# Patient Record
Sex: FEMALE | Race: WHITE | NOT HISPANIC OR LATINO | Employment: OTHER | URBAN - METROPOLITAN AREA
[De-identification: names, ages, dates, MRNs, and addresses within clinical notes are randomized per-mention and may not be internally consistent; named-entity substitution may affect disease eponyms.]

---

## 2017-10-30 ENCOUNTER — APPOINTMENT (OUTPATIENT)
Dept: WOMENS IMAGING | Facility: HOSPITAL | Age: 77
End: 2017-10-30

## 2017-10-30 PROCEDURE — G0202 SCR MAMMO BI INCL CAD: HCPCS | Performed by: RADIOLOGY

## 2017-10-30 PROCEDURE — 77063 BREAST TOMOSYNTHESIS BI: CPT | Performed by: RADIOLOGY

## 2018-01-05 ENCOUNTER — APPOINTMENT (OUTPATIENT)
Dept: LAB | Facility: HOSPITAL | Age: 78
End: 2018-01-05

## 2018-01-05 ENCOUNTER — APPOINTMENT (OUTPATIENT)
Dept: ONCOLOGY | Facility: CLINIC | Age: 78
End: 2018-01-05

## 2018-01-22 ENCOUNTER — OFFICE VISIT (OUTPATIENT)
Dept: ONCOLOGY | Facility: CLINIC | Age: 78
End: 2018-01-22

## 2018-01-22 ENCOUNTER — LAB (OUTPATIENT)
Dept: LAB | Facility: HOSPITAL | Age: 78
End: 2018-01-22

## 2018-01-22 VITALS
HEIGHT: 67 IN | BODY MASS INDEX: 24.83 KG/M2 | HEART RATE: 86 BPM | TEMPERATURE: 98.1 F | WEIGHT: 158.2 LBS | SYSTOLIC BLOOD PRESSURE: 150 MMHG | RESPIRATION RATE: 16 BRPM | DIASTOLIC BLOOD PRESSURE: 90 MMHG | OXYGEN SATURATION: 98 %

## 2018-01-22 DIAGNOSIS — C50.011 MALIGNANT NEOPLASM OF AREOLA OF RIGHT BREAST IN FEMALE, UNSPECIFIED ESTROGEN RECEPTOR STATUS (HCC): Primary | ICD-10-CM

## 2018-01-22 LAB
ALBUMIN SERPL-MCNC: 4.3 G/DL (ref 3.5–5.2)
ALBUMIN/GLOB SERPL: 1.4 G/DL (ref 1.1–2.4)
ALP SERPL-CCNC: 66 U/L (ref 38–116)
ALT SERPL W P-5'-P-CCNC: 15 U/L (ref 0–33)
ANION GAP SERPL CALCULATED.3IONS-SCNC: 11.9 MMOL/L
AST SERPL-CCNC: 28 U/L (ref 0–32)
BASOPHILS # BLD AUTO: 0.03 10*3/MM3 (ref 0–0.1)
BASOPHILS NFR BLD AUTO: 0.8 % (ref 0–1.1)
BILIRUB SERPL-MCNC: 0.6 MG/DL (ref 0.1–1.2)
BUN BLD-MCNC: 7 MG/DL (ref 6–20)
BUN/CREAT SERPL: 10.3 (ref 7.3–30)
CALCIUM SPEC-SCNC: 9.7 MG/DL (ref 8.5–10.2)
CHLORIDE SERPL-SCNC: 98 MMOL/L (ref 98–107)
CO2 SERPL-SCNC: 30.1 MMOL/L (ref 22–29)
CREAT BLD-MCNC: 0.68 MG/DL (ref 0.6–1.1)
DEPRECATED RDW RBC AUTO: 41.4 FL (ref 37–49)
EOSINOPHIL # BLD AUTO: 0.03 10*3/MM3 (ref 0–0.36)
EOSINOPHIL NFR BLD AUTO: 0.8 % (ref 1–5)
ERYTHROCYTE [DISTWIDTH] IN BLOOD BY AUTOMATED COUNT: 12.3 % (ref 11.7–14.5)
GFR SERPL CREATININE-BSD FRML MDRD: 84 ML/MIN/1.73
GLOBULIN UR ELPH-MCNC: 3 GM/DL (ref 1.8–3.5)
GLUCOSE BLD-MCNC: 88 MG/DL (ref 74–124)
HCT VFR BLD AUTO: 47.6 % (ref 34–45)
HGB BLD-MCNC: 16.2 G/DL (ref 11.5–14.9)
HOLD SPECIMEN: NORMAL
IMM GRANULOCYTES # BLD: 0 10*3/MM3 (ref 0–0.03)
IMM GRANULOCYTES NFR BLD: 0 % (ref 0–0.5)
LYMPHOCYTES # BLD AUTO: 0.82 10*3/MM3 (ref 1–3.5)
LYMPHOCYTES NFR BLD AUTO: 22.6 % (ref 20–49)
MCH RBC QN AUTO: 31.3 PG (ref 27–33)
MCHC RBC AUTO-ENTMCNC: 34 G/DL (ref 32–35)
MCV RBC AUTO: 91.9 FL (ref 83–97)
MONOCYTES # BLD AUTO: 0.39 10*3/MM3 (ref 0.25–0.8)
MONOCYTES NFR BLD AUTO: 10.7 % (ref 4–12)
NEUTROPHILS # BLD AUTO: 2.36 10*3/MM3 (ref 1.5–7)
NEUTROPHILS NFR BLD AUTO: 65.1 % (ref 39–75)
NRBC BLD MANUAL-RTO: 0 /100 WBC (ref 0–0)
PLATELET # BLD AUTO: 221 10*3/MM3 (ref 150–375)
PMV BLD AUTO: 9.2 FL (ref 8.9–12.1)
POTASSIUM BLD-SCNC: 4.3 MMOL/L (ref 3.5–4.7)
PROT SERPL-MCNC: 7.3 G/DL (ref 6.3–8)
RBC # BLD AUTO: 5.18 10*6/MM3 (ref 3.9–5)
SODIUM BLD-SCNC: 140 MMOL/L (ref 134–145)
WBC NRBC COR # BLD: 3.63 10*3/MM3 (ref 4–10)

## 2018-01-22 PROCEDURE — 99213 OFFICE O/P EST LOW 20 MIN: CPT | Performed by: INTERNAL MEDICINE

## 2018-01-22 PROCEDURE — 85025 COMPLETE CBC W/AUTO DIFF WBC: CPT | Performed by: INTERNAL MEDICINE

## 2018-01-22 PROCEDURE — 36415 COLL VENOUS BLD VENIPUNCTURE: CPT | Performed by: INTERNAL MEDICINE

## 2018-01-22 PROCEDURE — 80053 COMPREHEN METABOLIC PANEL: CPT | Performed by: INTERNAL MEDICINE

## 2018-01-22 NOTE — PROGRESS NOTES
Subjective .  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 Stage I breast carcinoma, status post initiation of Arimidex on 12/15/2011.   2. ? Erythrocytosis in the setting of recent syncopal episode reviewed 01/13/2014.   3. JAK2 analysis negative, slow improvement per hemoglobin and hematocrit. Low erythropoietin level noted, vestibular rehab successful in reducing dizziness.   4. Erythrocytosis analyses negative for myeloproliferative disorder, improvement per blood pressure med ication adjustment noted.   5. Patient seen 12/17/2014, stable. A 12-month followup is planned/Arimidex anticipated until 2016.   6. Patient seen 01/19/2016, additional DEXA scan and followup assessment December 2016 with anticipated completion of Arimidex.   7. Patient reviewed December 20, 2016, Arimidex is continued, bone density with evidence of osteopenia  8. 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    History of Present Illness     Patient is a 77-year-old female with history of right-sided breast carcinoma. After delay initially, as a result of abscess petra-surgically, she was able to proceed with radiation therapy. We saw her late in December with bone de n sity showing mild osteopenia. As a result of this, she began Arimidex at approximately the same time. She was seen on 3/26/12 doing well on her medication and completed radiation therapy, and was actually improving in general at that point. We asked he r to continue Arimidex for an additional 4 months and seen on 8/9/12 doing well.       Thereafter she was asked to be reviewed again approximately six months later, 01/22/2013. She was having no difficulty with her Arimidex and we planned a yearly return.       She is now seen 01/13/2014 and though she it does not appear she is having trouble with the Arimidex, she does recognize two episodes of syncope that led to  a hospitalization and adjustment of her blood pressure medications the last several months. She has no t had any additional episodes since last seen, but it is noted that her hemoglobin and hematocrit are increasing in levels.       The patient was reviewed 01/12/2013 with JAK2 analysis, in particular found to be negative for [id number] point mutation. Additional kevin dies, however, show a reduced erythropoietin level down to 5.4, normal iron studies, normal B12 studies, and normal serum chemistry.       The patient returns today, she states that she has gone on and been assessed for vestibular dysfunction, and through additional rehab and exercise she no longer has the dizziness that she had previously, in fact feels somewhat better than previous.       The patient when reviewed 03/12/2014 had gradual escalation of hemoglobin and hematocrit. 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 These were all normal. As the patient returns back today, 06/04/2014, she is feeling somewhat better overall as result of blood pressure medication adjustment and it is likely that hemoconcentration was the reason for her increasing hemoglobin and hematocrit levels.       The patient thereafter was asked to return for followup and is now seen in December 2014. She is doing very well overall with no additional symptoms.       The patient was asked to see him back now reviewed 01/19/2016 doing well overall. She recognizes that she is very likely going to complete Arimidex by December of this year.    The patient is now reviewed December 20, 2016.  We plan to discontinue her Arimidex today.  A follow-up bone density was performed December 13 revealing evidence of osteopenia-lumbar T score -1.7 with a 4.6% interval decrease, left hip density mL neck with a T score -1.3 with a 4.4% interval decrease in a right hip density T score -1.7 with a 9.1%  interval decrease.  We have discussed with discontinuance of her AI therapy that this should improve.   The patient is next seen January 22, 2018.  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  She is ordered had the same process involving her right eye though this improved with corneal transplant previously.  She follows with ophthalmology regularly.        Past Medical History:   Diagnosis Date   • Anxiety    • Cancer     Stage I, right breast cancer   • Depression    • Disease of thyroid gland     hypothyroidusm   • Erythrocytosis    • Hypercholesterolemia    • Hypertension    • Osteoporosis     osteopenia mild       ONCOLOGIC HISTORY:  (History from previous dates can be found in the separate document.)    Current Outpatient Prescriptions on File Prior to Visit   Medication Sig Dispense Refill   • amLODIPine (NORVASC) 5 MG tablet Take  by mouth Daily.     • anastrozole (ARIMIDEX) 1 MG tablet 1 TAB TAKE 1 TABLET BY MOUTH DAILY 30 tablet 1   • aspirin 81 MG tablet Take  by mouth.     • atorvastatin (LIPITOR) 40 MG tablet Take  by mouth Daily.     • buPROPion XL (WELLBUTRIN XL) 300 MG 24 hr tablet Take  by mouth.     • Cholecalciferol (RA VITAMIN D-3) 2000 UNITS capsule Take  by mouth.     • levothyroxine (LEVOXYL) 100 MCG tablet Take  by mouth Daily.     • triamterene-hydrochlorothiazide (DYAZIDE) 37.5-25 MG per capsule Take  by mouth.     • venlafaxine XR (EFFEXOR-XR) 75 MG 24 hr capsule Take  by mouth Daily.       No current facility-administered medications on file prior to visit.        ALLERGIES:     Allergies   Allergen Reactions   • Ciprofloxacin        Social History     Social History   • Marital status:      Spouse name: Sami   • Number of children: N/A   • Years of education: N/A     Occupational History   •  Retired          Social History Main Topics   • Smoking status: Never Smoker   • Smokeless tobacco: Never Used   • Alcohol use Yes       Comment: 3-5 times per week   • Drug use: No   • Sexual activity: Not on file     Other Topics Concern   • Not on file     Social History Narrative         Cancer-related family history includes Brain cancer in her father; Breast cancer in her maternal aunt, other, and paternal aunt; Breast cancer (age of onset: 43) in her sister; Cancer in her maternal aunt; Liver cancer in her father.     Review of Systems  A comprehensive 14 point review of systems was performed and was negative except as mentioned.    Objective      There were no vitals filed for this visit.  Current Status 12/20/2016   ECOG score 0       Physical Exam      GENERAL: Well-developed, well-nourished female in no acute distress.   SKIN: Warm, dry without rashes, purpura or petechiae.   HEAD: Normocephalic.   EYES: Pupils equal, round and reactive to light. EOMs intact. Conjunctivae normal.   EARS: Hearing intact.   NOSE: Septum midline. No excoriations or nasal discharge.   MOUTH: Tongue is well-papillated; no stomatitis or ulcers. Lips normal.   THROAT: Oropharynx without lesions or exudates.   NECK: Supple with good range of motion; no thyromegaly or masses, no JVD or bruits.   LYMPHATICS: No cervical, supraclavicular, axillary or inguinal adenopathy.   CHEST: Lungs clear to percussion and auscultation.   CARDIAC: Regular rate and rhythm without murmurs, rubs or gallops.   ABDOMEN: Soft, nontender with no organomegaly or masses.   EXTREMITIES: No clubbing, cyanosis or edema.   NEUROLOGICAL: No focal neurological deficits.       RECENT LABS:  Hematology WBC   Date Value Ref Range Status   12/20/2016 5.18 4.00 - 10.00 10*3/mm3 Final     RBC   Date Value Ref Range Status   12/20/2016 4.83 3.90 - 5.00 10*6/mm3 Final     Hemoglobin   Date Value Ref Range Status   12/20/2016 14.9 11.5 - 14.9 g/dL Final     Hematocrit   Date Value Ref Range Status   12/20/2016 43.0 34.0 - 45.0 % Final     Platelets   Date Value Ref Range Status   12/20/2016 201 150  - 045 10*3/mm3 Final        Assessment/Plan   A 77-year-old female with:   1. Stage I breast carcinoma, status post initiation of Arimidex on 12/15/2011. She is tolerated this well and the drug was discontinued December 2016.  2. Erythrocytosis; consistent with hypertension and treatment thereof. Her current labs studies are acceptable.       PLAN: 1.  Yearly follow-up

## 2018-11-19 ENCOUNTER — APPOINTMENT (OUTPATIENT)
Dept: WOMENS IMAGING | Facility: HOSPITAL | Age: 78
End: 2018-11-19

## 2018-11-19 PROCEDURE — 77067 SCR MAMMO BI INCL CAD: CPT | Performed by: RADIOLOGY

## 2018-11-19 PROCEDURE — 77063 BREAST TOMOSYNTHESIS BI: CPT | Performed by: RADIOLOGY

## 2019-01-24 NOTE — PROGRESS NOTES
Subjective .  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 Stage I breast carcinoma, status post initiation of Arimidex on 12/15/2011.   2. ? Erythrocytosis in the setting of recent syncopal episode reviewed 01/13/2014.   3. JAK2 analysis negative, slow improvement per hemoglobin and hematocrit. Low erythropoietin level noted, vestibular rehab successful in reducing dizziness.   4. Erythrocytosis analyses negative for myeloproliferative disorder, improvement per blood pressure med ication adjustment noted.   5. Patient seen 12/17/2014, stable. A 12-month followup is planned/Arimidex anticipated until 2016.   6. Patient seen 01/19/2016, additional DEXA scan and followup assessment December 2016 with anticipated completion of Arimidex.   7. Patient reviewed December 20, 2016, Arimidex is continued, bone density with evidence of osteopenia  8. 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  9. Patient reviewed October 20, 2019, continued issues with her left eye recognize, stable otherwise    History of Present Illness     Patient is a 78-year-old female with history of right-sided breast carcinoma. After delay initially, as a result of abscess petra-surgically, she was able to proceed with radiation therapy. We saw her late in December with bone de n markos showing mild osteopenia. As a result of this, she began Arimidex at approximately the same time. She was seen on 3/26/12 doing well on her medication and completed radiation therapy, and was actually improving in general at that point. We asked he r to continue Arimidex for an additional 4 months and seen on 8/9/12 doing well.       Thereafter she was asked to be reviewed again approximately six months later, 01/22/2013. She was having no difficulty with her Arimidex and we planned a yearly return.       She is now seen 01/13/2014 and though she it does  not appear she is having trouble with the Arimidex, she does recognize two episodes of syncope that led to a hospitalization and adjustment of her blood pressure medications the last several months. She has no t had any additional episodes since last seen, but it is noted that her hemoglobin and hematocrit are increasing in levels.       The patient was reviewed 01/12/2013 with JAK2 analysis, in particular found to be negative for [id number] point mutation. Additional kevin dies, however, show a reduced erythropoietin level down to 5.4, normal iron studies, normal B12 studies, and normal serum chemistry.       The patient returns today, she states that she has gone on and been assessed for vestibular dysfunction, and through additional rehab and exercise she no longer has the dizziness that she had previously, in fact feels somewhat better than previous.       The patient when reviewed 03/12/2014 had gradual escalation of hemoglobin and hematocrit. 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 These were all normal. As the patient returns back today, 06/04/2014, she is feeling somewhat better overall as result of blood pressure medication adjustment and it is likely that hemoconcentration was the reason for her increasing hemoglobin and hematocrit levels.       The patient thereafter was asked to return for followup and is now seen in December 2014. She is doing very well overall with no additional symptoms.       The patient was asked to see him back now reviewed 01/19/2016 doing well overall. She recognizes that she is very likely going to complete Arimidex by December of this year.    The patient is now reviewed December 20, 2016.  We plan to discontinue her Arimidex today.  A follow-up bone density was performed December 13 revealing evidence of osteopenia-lumbar T score -1.7 with a 4.6% interval decrease, left hip density mL  neck with a T score -1.3 with a 4.4% interval decrease in a right hip density T score -1.7 with a 9.1% interval decrease.  We have discussed with discontinuance of her AI therapy that this should improve.   The patient is next seen January 22, 2018.  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  She is ordered had the same process involving her right eye though this improved with corneal transplant previously.  She follows with ophthalmology regularly.    The patient's next seen December 28, 2019.  Her left eye is to the point that she is having very poor vision and she has been told this nothing now second be done to improve this.  She still is able to function howeve    Past Medical History:   Diagnosis Date   • Anxiety    • Cancer (CMS/HCC)     Stage I, right breast cancer   • Depression    • Disease of thyroid gland     hypothyroidusm   • Erythrocytosis    • Hypercholesterolemia    • Hypertension    • Osteoporosis     osteopenia mild       ONCOLOGIC HISTORY:  (History from previous dates can be found in the separate document.)    Current Outpatient Medications on File Prior to Visit   Medication Sig Dispense Refill   • amLODIPine (NORVASC) 5 MG tablet Take  by mouth Daily.     • atorvastatin (LIPITOR) 40 MG tablet Take  by mouth Daily.     • buPROPion XL (WELLBUTRIN XL) 300 MG 24 hr tablet Take  by mouth.     • Cholecalciferol (RA VITAMIN D-3) 2000 UNITS capsule Take  by mouth.     • levothyroxine (LEVOXYL) 100 MCG tablet Take  by mouth Daily.     • triamterene-hydrochlorothiazide (DYAZIDE) 37.5-25 MG per capsule Take  by mouth.     • venlafaxine XR (EFFEXOR-XR) 75 MG 24 hr capsule Take  by mouth Daily.       No current facility-administered medications on file prior to visit.        ALLERGIES:     Allergies   Allergen Reactions   • Ciprofloxacin        Social History     Socioeconomic History   • Marital status:      Spouse name: Sami   • Number of  "children: Not on file   • Years of education: Not on file   • Highest education level: Not on file   Social Needs   • Financial resource strain: Not on file   • Food insecurity - worry: Not on file   • Food insecurity - inability: Not on file   • Transportation needs - medical: Not on file   • Transportation needs - non-medical: Not on file   Occupational History     Employer: RETIRED     Comment:    Tobacco Use   • Smoking status: Never Smoker   • Smokeless tobacco: Never Used   Substance and Sexual Activity   • Alcohol use: Yes     Comment: 3-5 times per week   • Drug use: No   • Sexual activity: Defer   Other Topics Concern   • Not on file   Social History Narrative   • Not on file         Cancer-related family history includes Brain cancer in her father; Breast cancer in her maternal aunt, other, and paternal aunt; Breast cancer (age of onset: 43) in her sister; Cancer in her maternal aunt; Liver cancer in her father.     Review of Systems   Constitutional: Negative for fatigue.   Respiratory: Negative for chest tightness, shortness of breath and wheezing.    Gastrointestinal: Negative for abdominal pain, constipation, diarrhea, nausea and vomiting.   Neurological: Negative for weakness.     A comprehensive 14 point review of systems was performed and was negative except as mentioned.    Objective      Vitals:    01/28/19 1525   BP: 154/86   Pulse: 62   Resp: 18   Temp: 98.2 °F (36.8 °C)   TempSrc: Oral   SpO2: 95%   Weight: 77.2 kg (170 lb 1.6 oz)   Height: 167.9 cm (66.1\")  Comment: new yearly height   PainSc: 0-No pain     Current Status 1/28/2019   ECOG score 1       Physical Exam      GENERAL: Well-developed, well-nourished female in no acute distress.   SKIN: Warm, dry without rashes, purpura or petechiae.   HEAD: Normocephalic.   EYES: Pupils equal, round and reactive to light. EOMs intact. Conjunctivae normal.   EARS: Hearing intact.   NOSE: Septum midline. No excoriations or nasal discharge. "   MOUTH: Tongue is well-papillated; no stomatitis or ulcers. Lips normal.   THROAT: Oropharynx without lesions or exudates.   NECK: Supple with good range of motion; no thyromegaly or masses, no JVD or bruits.   LYMPHATICS: No cervical, supraclavicular, axillary or inguinal adenopathy.   CHEST: Lungs clear to percussion and auscultation.   CARDIAC: Regular rate and rhythm without murmurs, rubs or gallops.   ABDOMEN: Soft, nontender with no organomegaly or masses.   EXTREMITIES: No clubbing, cyanosis or edema.   NEUROLOGICAL: No focal neurological deficits.       RECENT LABS:  Hematology WBC   Date Value Ref Range Status   01/28/2019 3.38 (L) 4.00 - 10.00 10*3/mm3 Final     RBC   Date Value Ref Range Status   01/28/2019 5.08 (H) 3.90 - 5.00 10*6/mm3 Final     Hemoglobin   Date Value Ref Range Status   01/28/2019 15.7 (H) 11.5 - 14.9 g/dL Final     Hematocrit   Date Value Ref Range Status   01/28/2019 45.2 (H) 34.0 - 45.0 % Final     Platelets   Date Value Ref Range Status   01/28/2019 186 150 - 375 10*3/mm3 Final        Assessment/Plan   A 78-year-old female with:   1. Stage I breast carcinoma, status post initiation of Arimidex on 12/15/2011. She is tolerated this well and the drug was discontinued December 2016.  2. Erythrocytosis; consistent with hypertension and treatment thereof. Her current labs studies are acceptable.       PLAN: 1.  Yearly follow-up

## 2019-01-28 ENCOUNTER — OFFICE VISIT (OUTPATIENT)
Dept: ONCOLOGY | Facility: CLINIC | Age: 79
End: 2019-01-28

## 2019-01-28 ENCOUNTER — LAB (OUTPATIENT)
Dept: LAB | Facility: HOSPITAL | Age: 79
End: 2019-01-28

## 2019-01-28 VITALS
RESPIRATION RATE: 18 BRPM | HEART RATE: 62 BPM | TEMPERATURE: 98.2 F | HEIGHT: 66 IN | DIASTOLIC BLOOD PRESSURE: 86 MMHG | WEIGHT: 170.1 LBS | SYSTOLIC BLOOD PRESSURE: 154 MMHG | BODY MASS INDEX: 27.34 KG/M2 | OXYGEN SATURATION: 95 %

## 2019-01-28 DIAGNOSIS — C50.011 MALIGNANT NEOPLASM OF AREOLA OF RIGHT BREAST IN FEMALE, UNSPECIFIED ESTROGEN RECEPTOR STATUS (HCC): Primary | ICD-10-CM

## 2019-01-28 LAB
ALBUMIN SERPL-MCNC: 4.4 G/DL (ref 3.5–5.2)
ALBUMIN/GLOB SERPL: 1.6 G/DL (ref 1.1–2.4)
ALP SERPL-CCNC: 62 U/L (ref 38–116)
ALT SERPL W P-5'-P-CCNC: 9 U/L (ref 0–33)
ANION GAP SERPL CALCULATED.3IONS-SCNC: 11.9 MMOL/L
AST SERPL-CCNC: 21 U/L (ref 0–32)
BASOPHILS # BLD AUTO: 0.01 10*3/MM3 (ref 0–0.1)
BASOPHILS NFR BLD AUTO: 0.3 % (ref 0–1.1)
BILIRUB SERPL-MCNC: 0.6 MG/DL (ref 0.1–1.2)
BUN BLD-MCNC: 11 MG/DL (ref 6–20)
BUN/CREAT SERPL: 15.1 (ref 7.3–30)
CALCIUM SPEC-SCNC: 9.4 MG/DL (ref 8.5–10.2)
CHLORIDE SERPL-SCNC: 92 MMOL/L (ref 98–107)
CO2 SERPL-SCNC: 28.1 MMOL/L (ref 22–29)
CREAT BLD-MCNC: 0.73 MG/DL (ref 0.6–1.1)
DEPRECATED RDW RBC AUTO: 40.1 FL (ref 37–49)
EOSINOPHIL # BLD AUTO: 0.03 10*3/MM3 (ref 0–0.36)
EOSINOPHIL NFR BLD AUTO: 0.9 % (ref 1–5)
ERYTHROCYTE [DISTWIDTH] IN BLOOD BY AUTOMATED COUNT: 12.3 % (ref 11.7–14.5)
GFR SERPL CREATININE-BSD FRML MDRD: 77 ML/MIN/1.73
GLOBULIN UR ELPH-MCNC: 2.7 GM/DL (ref 1.8–3.5)
GLUCOSE BLD-MCNC: 77 MG/DL (ref 74–124)
HCT VFR BLD AUTO: 45.2 % (ref 34–45)
HGB BLD-MCNC: 15.7 G/DL (ref 11.5–14.9)
IMM GRANULOCYTES # BLD AUTO: 0.01 10*3/MM3 (ref 0–0.03)
IMM GRANULOCYTES NFR BLD AUTO: 0.3 % (ref 0–0.5)
LYMPHOCYTES # BLD AUTO: 0.88 10*3/MM3 (ref 1–3.5)
LYMPHOCYTES NFR BLD AUTO: 26 % (ref 20–49)
MCH RBC QN AUTO: 30.9 PG (ref 27–33)
MCHC RBC AUTO-ENTMCNC: 34.7 G/DL (ref 32–35)
MCV RBC AUTO: 89 FL (ref 83–97)
MONOCYTES # BLD AUTO: 0.4 10*3/MM3 (ref 0.25–0.8)
MONOCYTES NFR BLD AUTO: 11.8 % (ref 4–12)
NEUTROPHILS # BLD AUTO: 2.05 10*3/MM3 (ref 1.5–7)
NEUTROPHILS NFR BLD AUTO: 60.7 % (ref 39–75)
NRBC BLD AUTO-RTO: 0 /100 WBC (ref 0–0)
PLATELET # BLD AUTO: 186 10*3/MM3 (ref 150–375)
PMV BLD AUTO: 9.8 FL (ref 8.9–12.1)
POTASSIUM BLD-SCNC: 4.3 MMOL/L (ref 3.5–4.7)
PROT SERPL-MCNC: 7.1 G/DL (ref 6.3–8)
RBC # BLD AUTO: 5.08 10*6/MM3 (ref 3.9–5)
SODIUM BLD-SCNC: 132 MMOL/L (ref 134–145)
WBC NRBC COR # BLD: 3.38 10*3/MM3 (ref 4–10)

## 2019-01-28 PROCEDURE — 99213 OFFICE O/P EST LOW 20 MIN: CPT | Performed by: INTERNAL MEDICINE

## 2019-01-28 PROCEDURE — 80053 COMPREHEN METABOLIC PANEL: CPT

## 2019-01-28 PROCEDURE — 36415 COLL VENOUS BLD VENIPUNCTURE: CPT | Performed by: INTERNAL MEDICINE

## 2019-01-28 PROCEDURE — 85025 COMPLETE CBC W/AUTO DIFF WBC: CPT

## 2019-07-18 ENCOUNTER — APPOINTMENT (OUTPATIENT)
Dept: WOMENS IMAGING | Facility: HOSPITAL | Age: 79
End: 2019-07-18

## 2019-07-18 PROCEDURE — 77065 DX MAMMO INCL CAD UNI: CPT | Performed by: RADIOLOGY

## 2019-07-18 PROCEDURE — 76641 ULTRASOUND BREAST COMPLETE: CPT | Performed by: RADIOLOGY

## 2019-07-18 PROCEDURE — G0279 TOMOSYNTHESIS, MAMMO: HCPCS | Performed by: RADIOLOGY

## 2019-07-24 ENCOUNTER — APPOINTMENT (OUTPATIENT)
Dept: WOMENS IMAGING | Facility: HOSPITAL | Age: 79
End: 2019-07-24

## 2020-01-29 ENCOUNTER — TELEPHONE (OUTPATIENT)
Dept: ONCOLOGY | Facility: CLINIC | Age: 80
End: 2020-01-29

## 2020-01-29 NOTE — TELEPHONE ENCOUNTER
Patient wanted to cancel her appointment on 2/10 with Dr. Knox.  I changed it to 2/28, but had the lab after the appointment with Dr. Knox.  She does not want any appointments before 9:00 AM.  I was unable to change the appts on 2/28 then, because they are not within the 3 week period after the original appointment.  Please call the patient to get these rescheduled.    [phone]

## 2020-02-10 ENCOUNTER — APPOINTMENT (OUTPATIENT)
Dept: LAB | Facility: HOSPITAL | Age: 80
End: 2020-02-10

## 2020-02-25 DIAGNOSIS — C50.011 MALIGNANT NEOPLASM OF AREOLA OF RIGHT BREAST IN FEMALE, UNSPECIFIED ESTROGEN RECEPTOR STATUS (HCC): Primary | ICD-10-CM

## 2020-02-28 ENCOUNTER — LAB (OUTPATIENT)
Dept: LAB | Facility: HOSPITAL | Age: 80
End: 2020-02-28

## 2020-02-28 ENCOUNTER — OFFICE VISIT (OUTPATIENT)
Dept: ONCOLOGY | Facility: CLINIC | Age: 80
End: 2020-02-28

## 2020-02-28 VITALS
HEART RATE: 59 BPM | SYSTOLIC BLOOD PRESSURE: 119 MMHG | OXYGEN SATURATION: 95 % | RESPIRATION RATE: 16 BRPM | TEMPERATURE: 98 F | BODY MASS INDEX: 30.02 KG/M2 | HEIGHT: 66 IN | DIASTOLIC BLOOD PRESSURE: 78 MMHG | WEIGHT: 186.8 LBS

## 2020-02-28 DIAGNOSIS — C50.011 MALIGNANT NEOPLASM OF AREOLA OF RIGHT BREAST IN FEMALE, UNSPECIFIED ESTROGEN RECEPTOR STATUS (HCC): ICD-10-CM

## 2020-02-28 DIAGNOSIS — M81.0 OSTEOPOROSIS WITHOUT CURRENT PATHOLOGICAL FRACTURE, UNSPECIFIED OSTEOPOROSIS TYPE: Primary | ICD-10-CM

## 2020-02-28 LAB
ALBUMIN SERPL-MCNC: 4.2 G/DL (ref 3.5–5.2)
ALBUMIN/GLOB SERPL: 1.4 G/DL (ref 1.1–2.4)
ALP SERPL-CCNC: 65 U/L (ref 38–116)
ALT SERPL W P-5'-P-CCNC: 11 U/L (ref 0–33)
ANION GAP SERPL CALCULATED.3IONS-SCNC: 12.6 MMOL/L (ref 5–15)
AST SERPL-CCNC: 21 U/L (ref 0–32)
BASOPHILS # BLD AUTO: 0.02 10*3/MM3 (ref 0–0.2)
BASOPHILS NFR BLD AUTO: 0.6 % (ref 0–1.5)
BILIRUB SERPL-MCNC: 0.5 MG/DL (ref 0.2–1.2)
BUN BLD-MCNC: 14 MG/DL (ref 6–20)
BUN/CREAT SERPL: 18.2 (ref 7.3–30)
CALCIUM SPEC-SCNC: 9.8 MG/DL (ref 8.5–10.2)
CHLORIDE SERPL-SCNC: 97 MMOL/L (ref 98–107)
CO2 SERPL-SCNC: 27.4 MMOL/L (ref 22–29)
CREAT BLD-MCNC: 0.77 MG/DL (ref 0.6–1.1)
DEPRECATED RDW RBC AUTO: 44.3 FL (ref 37–54)
EOSINOPHIL # BLD AUTO: 0.09 10*3/MM3 (ref 0–0.4)
EOSINOPHIL NFR BLD AUTO: 2.5 % (ref 0.3–6.2)
ERYTHROCYTE [DISTWIDTH] IN BLOOD BY AUTOMATED COUNT: 13 % (ref 12.3–15.4)
GFR SERPL CREATININE-BSD FRML MDRD: 72 ML/MIN/1.73
GLOBULIN UR ELPH-MCNC: 3 GM/DL (ref 1.8–3.5)
GLUCOSE BLD-MCNC: 86 MG/DL (ref 74–124)
HCT VFR BLD AUTO: 43.8 % (ref 34–46.6)
HGB BLD-MCNC: 14.9 G/DL (ref 12–15.9)
IMM GRANULOCYTES # BLD AUTO: 0 10*3/MM3 (ref 0–0.05)
IMM GRANULOCYTES NFR BLD AUTO: 0 % (ref 0–0.5)
LYMPHOCYTES # BLD AUTO: 1.43 10*3/MM3 (ref 0.7–3.1)
LYMPHOCYTES NFR BLD AUTO: 40.3 % (ref 19.6–45.3)
MCH RBC QN AUTO: 31.7 PG (ref 26.6–33)
MCHC RBC AUTO-ENTMCNC: 34 G/DL (ref 31.5–35.7)
MCV RBC AUTO: 93.2 FL (ref 79–97)
MONOCYTES # BLD AUTO: 0.46 10*3/MM3 (ref 0.1–0.9)
MONOCYTES NFR BLD AUTO: 13 % (ref 5–12)
NEUTROPHILS # BLD AUTO: 1.55 10*3/MM3 (ref 1.7–7)
NEUTROPHILS NFR BLD AUTO: 43.6 % (ref 42.7–76)
NRBC BLD AUTO-RTO: 0 /100 WBC (ref 0–0.2)
PLATELET # BLD AUTO: 199 10*3/MM3 (ref 140–450)
PMV BLD AUTO: 9.4 FL (ref 6–12)
POTASSIUM BLD-SCNC: 4 MMOL/L (ref 3.5–4.7)
PROT SERPL-MCNC: 7.2 G/DL (ref 6.3–8)
RBC # BLD AUTO: 4.7 10*6/MM3 (ref 3.77–5.28)
SODIUM BLD-SCNC: 137 MMOL/L (ref 134–145)
WBC NRBC COR # BLD: 3.55 10*3/MM3 (ref 3.4–10.8)

## 2020-02-28 PROCEDURE — 85025 COMPLETE CBC W/AUTO DIFF WBC: CPT

## 2020-02-28 PROCEDURE — 99213 OFFICE O/P EST LOW 20 MIN: CPT | Performed by: INTERNAL MEDICINE

## 2020-02-28 PROCEDURE — 36415 COLL VENOUS BLD VENIPUNCTURE: CPT

## 2020-02-28 PROCEDURE — 80053 COMPREHEN METABOLIC PANEL: CPT

## 2020-02-28 NOTE — PROGRESS NOTES
Subjective .  States that she is feeling well, discussed her history including osteoporosis    REASONS FOR FOLLOWUP:  1. Stage I breast carcinoma, status post initiation of Arimidex on 12/15/2011.   2. ? Erythrocytosis in the setting of recent syncopal episode reviewed 01/13/2014.   3. JAK2 analysis negative, slow improvement per hemoglobin and hematocrit. Low erythropoietin level noted, vestibular rehab successful in reducing dizziness.   4. Erythrocytosis analyses negative for myeloproliferative disorder, improvement per blood pressure med ication adjustment noted.   5. Patient seen 12/17/2014, stable. A 12-month followup is planned/Arimidex anticipated until 2016.   6. Patient seen 01/19/2016, additional DEXA scan and followup assessment December 2016 with anticipated completion of Arimidex.   7. Patient reviewed December 20, 2016, Arimidex is continued, bone density with evidence of osteopenia  8. 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  9. Patient reviewed October 20, 2019, continued issues with her left eye recognized  10. Patient assessed every 28 2020 stable clinically, plans for subsequent bone density as she approaches 10 years from diagnosis    History of Present Illness     Patient is a 79-year-old female with history of right-sided breast carcinoma. After delay initially, as a result of abscess petra-surgically, she was able to proceed with radiation therapy. We saw her late in December with bone jack burrows showing mild osteopenia. As a result of this, she began Arimidex at approximately the same time. She was seen on 3/26/12 doing well on her medication and completed radiation therapy, and was actually improving in general at that point. We asked he r to continue Arimidex for an additional 4 months and seen on 8/9/12 doing well.       Thereafter she was asked to be reviewed again approximately six months later, 01/22/2013. She was having no  difficulty with her Arimidex and we planned a yearly return.       She is now seen 01/13/2014 and though she it does not appear she is having trouble with the Arimidex, she does recognize two episodes of syncope that led to a hospitalization and adjustment of her blood pressure medications the last several months. She has no t had any additional episodes since last seen, but it is noted that her hemoglobin and hematocrit are increasing in levels.       The patient was reviewed 01/12/2013 with JAK2 analysis, in particular found to be negative for [id number] point mutation. Additional kevin dies, however, show a reduced erythropoietin level down to 5.4, normal iron studies, normal B12 studies, and normal serum chemistry.       The patient returns today, she states that she has gone on and been assessed for vestibular dysfunction, and through additional rehab and exercise she no longer has the dizziness that she had previously, in fact feels somewhat better than previous.       The patient when reviewed 03/12/2014 had gradual escalation of hemoglobin and hematocrit. 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 These were all normal. As the patient returns back today, 06/04/2014, she is feeling somewhat better overall as result of blood pressure medication adjustment and it is likely that hemoconcentration was the reason for her increasing hemoglobin and hematocrit levels.       The patient thereafter was asked to return for followup and is now seen in December 2014. She is doing very well overall with no additional symptoms.       The patient was asked to see him back now reviewed 01/19/2016 doing well overall. She recognizes that she is very likely going to complete Arimidex by December of this year.    The patient is now reviewed December 20, 2016.  We plan to discontinue her Arimidex today.  A follow-up bone density was performed  December 13 revealing evidence of osteopenia-lumbar T score -1.7 with a 4.6% interval decrease, left hip density mL neck with a T score -1.3 with a 4.4% interval decrease in a right hip density T score -1.7 with a 9.1% interval decrease.  We have discussed with discontinuance of her AI therapy that this should improve.   The patient is next seen January 22, 2018.  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  She is ordered had the same process involving her right eye though this improved with corneal transplant previously.  She follows with ophthalmology regularly.    The patient's next seen December 28, 2019.  Her left eye is to the point that she is having very poor vision and she has been told this nothing now second be done to improve this.  She still is able to function howeve  The patient is next seen February 28, 2020 fortunately doing about the same and approaching 10 years from diagnosis.  We discussed her previous bone density done in 2015 demonstrating osteopenia.  This will need follow-up.  Past Medical History:   Diagnosis Date   • Anxiety    • Cancer (CMS/[facility])     Stage I, right breast cancer   • Depression    • Disease of thyroid gland     hypothyroidusm   • Erythrocytosis    • Hypercholesterolemia    • Hypertension    • Osteoporosis     osteopenia mild       ONCOLOGIC HISTORY:  (History from previous dates can be found in the separate document.)    Current Outpatient Medications on File Prior to Visit   Medication Sig Dispense Refill   • amLODIPine (NORVASC) 5 MG tablet Take  by mouth Daily.     • atorvastatin (LIPITOR) 40 MG tablet Take  by mouth Daily.     • buPROPion XL (WELLBUTRIN XL) 300 MG 24 hr tablet Take  by mouth.     • Cholecalciferol (RA VITAMIN D-3) 2000 UNITS capsule Take  by mouth.     • levothyroxine (LEVOXYL) 100 MCG tablet Take  by mouth Daily.     • triamterene-hydrochlorothiazide (DYAZIDE) 37.5-25 MG per capsule Take  by mouth.     •  "venlafaxine XR (EFFEXOR-XR) 75 MG 24 hr capsule Take  by mouth Daily.       No current facility-administered medications on file prior to visit.        ALLERGIES:     Allergies   Allergen Reactions   • Ciprofloxacin Unknown - Low Severity       Social History     Socioeconomic History   • Marital status:      Spouse name: Sami   • Number of children: Not on file   • Years of education: Not on file   • Highest education level: Not on file   Occupational History     Employer: RETIRED     Comment:    Tobacco Use   • Smoking status: Never Smoker   • Smokeless tobacco: Never Used   Substance and Sexual Activity   • Alcohol use: Yes     Comment: 3-5 times per week   • Drug use: No   • Sexual activity: Defer         Cancer-related family history includes Brain cancer in her father; Breast cancer in her maternal aunt, paternal aunt, and another family member; Breast cancer (age of onset: 43) in her sister; Cancer in her maternal aunt; Liver cancer in her father.     Review of Systems   Constitutional: Negative for fatigue.   Respiratory: Negative for chest tightness, shortness of breath and wheezing.    Gastrointestinal: Negative for abdominal pain, constipation, diarrhea, nausea and vomiting.   Neurological: Negative for weakness.     Objective      Vitals:    02/28/20 0932   BP: 119/78   Pulse: 59   Resp: 16   Temp: 98 °F (36.7 °C)   TempSrc: Oral   SpO2: 95%   Weight: 84.7 kg (186 lb 12.8 oz)   Height: 167.9 cm (66.1\")  Comment: new yearly height   PainSc: 0-No pain     Current Status 2/28/2020   ECOG score 0       Physical Exam      GENERAL: Well-developed, well-nourished female in no acute distress.   SKIN: Warm, dry without rashes, purpura or petechiae.   HEAD: Normocephalic.   EYES: Pupils equal, round and reactive to light. EOMs intact. Conjunctivae normal.   EARS: Hearing intact.   NOSE: Septum midline. No excoriations or nasal discharge.   MOUTH: Tongue is well-papillated; no stomatitis or " ulcers. Lips normal.   THROAT: Oropharynx without lesions or exudates.   NECK: Supple with good range of motion; no thyromegaly or masses, no JVD or bruits.   LYMPHATICS: No cervical, supraclavicular, axillary or inguinal adenopathy.   CHEST: Lungs clear to percussion and auscultation.   CARDIAC: Regular rate and rhythm without murmurs, rubs or gallops.   ABDOMEN: Soft, nontender with no organomegaly or masses.   EXTREMITIES: No clubbing, cyanosis or edema.   NEUROLOGICAL: No focal neurological deficits.       RECENT LABS:  Hematology WBC   Date Value Ref Range Status   02/28/2020 3.55 3.40 - 10.80 10*3/mm3 Final   02/10/2020 3.33 (L) 4.5 - 11.0 10*3/uL Final     RBC   Date Value Ref Range Status   02/28/2020 4.70 3.77 - 5.28 10*6/mm3 Final   02/10/2020 4.64 4.0 - 5.2 10*6/uL Final     Hemoglobin   Date Value Ref Range Status   02/28/2020 14.9 12.0 - 15.9 g/dL Final   02/10/2020 14.4 12.0 - 16.0 g/dL Final     Hematocrit   Date Value Ref Range Status   02/28/2020 43.8 34.0 - 46.6 % Final   02/10/2020 42.5 36.0 - 46.0 % Final     Platelets   Date Value Ref Range Status   02/28/2020 199 140 - 450 10*3/mm3 Final   02/10/2020 243 140 - 440 10*3/uL Final        Assessment/Plan   A [age]-year-old female with:   1. Stage I breast carcinoma, status post initiation of Arimidex on 12/15/2011. She is tolerated this well and the drug was discontinued December 2016.  2. Erythrocytosis; consistent with hypertension and treatment thereof. Her current labs studies are acceptable.   3.  Osteopenia history-subsequent DEXA to be scheduled      PLAN: 1.  Yearly follow-up, 2 weeks prior CMP, CBC and DEXA scan.  This will be 10-year follow-up.

## 2021-02-15 ENCOUNTER — TRANSCRIBE ORDERS (OUTPATIENT)
Dept: ONCOLOGY | Facility: CLINIC | Age: 81
End: 2021-02-15

## 2021-02-15 ENCOUNTER — APPOINTMENT (OUTPATIENT)
Dept: LAB | Facility: HOSPITAL | Age: 81
End: 2021-02-15

## 2021-02-15 ENCOUNTER — APPOINTMENT (OUTPATIENT)
Dept: BONE DENSITY | Facility: HOSPITAL | Age: 81
End: 2021-02-15

## 2021-02-15 DIAGNOSIS — M81.0 OSTEOPOROSIS WITHOUT CURRENT PATHOLOGICAL FRACTURE, UNSPECIFIED OSTEOPOROSIS TYPE: Primary | ICD-10-CM

## 2021-02-17 ENCOUNTER — LAB (OUTPATIENT)
Dept: LAB | Facility: HOSPITAL | Age: 81
End: 2021-02-17

## 2021-02-17 DIAGNOSIS — C50.011 MALIGNANT NEOPLASM OF AREOLA OF RIGHT BREAST IN FEMALE, UNSPECIFIED ESTROGEN RECEPTOR STATUS (HCC): ICD-10-CM

## 2021-02-17 DIAGNOSIS — M81.0 OSTEOPOROSIS WITHOUT CURRENT PATHOLOGICAL FRACTURE, UNSPECIFIED OSTEOPOROSIS TYPE: ICD-10-CM

## 2021-02-17 LAB
25(OH)D3 SERPL-MCNC: 36 NG/ML
ALBUMIN SERPL-MCNC: 4 G/DL (ref 3.5–5.2)
ALBUMIN/GLOB SERPL: 1.4 G/DL (ref 1.1–2.4)
ALP SERPL-CCNC: 66 U/L (ref 38–116)
ALT SERPL W P-5'-P-CCNC: 12 U/L (ref 0–33)
ANION GAP SERPL CALCULATED.3IONS-SCNC: 11.1 MMOL/L (ref 5–15)
AST SERPL-CCNC: 25 U/L (ref 0–32)
BASOPHILS # BLD AUTO: 0.02 10*3/MM3 (ref 0–0.2)
BASOPHILS NFR BLD AUTO: 0.5 % (ref 0–1.5)
BILIRUB SERPL-MCNC: 0.5 MG/DL (ref 0.2–1.2)
BUN SERPL-MCNC: 8 MG/DL (ref 6–20)
BUN/CREAT SERPL: 11.9 (ref 7.3–30)
CALCIUM SPEC-SCNC: 9.4 MG/DL (ref 8.5–10.2)
CHLORIDE SERPL-SCNC: 91 MMOL/L (ref 98–107)
CO2 SERPL-SCNC: 27.9 MMOL/L (ref 22–29)
CREAT SERPL-MCNC: 0.67 MG/DL (ref 0.6–1.1)
DEPRECATED RDW RBC AUTO: 42 FL (ref 37–54)
EOSINOPHIL # BLD AUTO: 0.05 10*3/MM3 (ref 0–0.4)
EOSINOPHIL NFR BLD AUTO: 1.3 % (ref 0.3–6.2)
ERYTHROCYTE [DISTWIDTH] IN BLOOD BY AUTOMATED COUNT: 12.8 % (ref 12.3–15.4)
GFR SERPL CREATININE-BSD FRML MDRD: 85 ML/MIN/1.73
GLOBULIN UR ELPH-MCNC: 2.8 GM/DL (ref 1.8–3.5)
GLUCOSE SERPL-MCNC: 84 MG/DL (ref 74–124)
HCT VFR BLD AUTO: 43.3 % (ref 34–46.6)
HGB BLD-MCNC: 15.6 G/DL (ref 12–15.9)
IMM GRANULOCYTES # BLD AUTO: 0 10*3/MM3 (ref 0–0.05)
IMM GRANULOCYTES NFR BLD AUTO: 0 % (ref 0–0.5)
LYMPHOCYTES # BLD AUTO: 1.55 10*3/MM3 (ref 0.7–3.1)
LYMPHOCYTES NFR BLD AUTO: 38.8 % (ref 19.6–45.3)
MCH RBC QN AUTO: 32.3 PG (ref 26.6–33)
MCHC RBC AUTO-ENTMCNC: 36 G/DL (ref 31.5–35.7)
MCV RBC AUTO: 89.6 FL (ref 79–97)
MONOCYTES # BLD AUTO: 0.44 10*3/MM3 (ref 0.1–0.9)
MONOCYTES NFR BLD AUTO: 11 % (ref 5–12)
NEUTROPHILS NFR BLD AUTO: 1.93 10*3/MM3 (ref 1.7–7)
NEUTROPHILS NFR BLD AUTO: 48.4 % (ref 42.7–76)
NRBC BLD AUTO-RTO: 0 /100 WBC (ref 0–0.2)
PLATELET # BLD AUTO: 191 10*3/MM3 (ref 140–450)
PMV BLD AUTO: 9.1 FL (ref 6–12)
POTASSIUM SERPL-SCNC: 3.7 MMOL/L (ref 3.5–4.7)
PROT SERPL-MCNC: 6.8 G/DL (ref 6.3–8)
RBC # BLD AUTO: 4.83 10*6/MM3 (ref 3.77–5.28)
SODIUM SERPL-SCNC: 130 MMOL/L (ref 134–145)
WBC # BLD AUTO: 3.99 10*3/MM3 (ref 3.4–10.8)

## 2021-02-17 PROCEDURE — 85025 COMPLETE CBC W/AUTO DIFF WBC: CPT

## 2021-02-17 PROCEDURE — 82306 VITAMIN D 25 HYDROXY: CPT | Performed by: INTERNAL MEDICINE

## 2021-02-17 PROCEDURE — 36415 COLL VENOUS BLD VENIPUNCTURE: CPT

## 2021-02-17 PROCEDURE — 80053 COMPREHEN METABOLIC PANEL: CPT

## 2021-03-01 ENCOUNTER — OFFICE VISIT (OUTPATIENT)
Dept: ONCOLOGY | Facility: CLINIC | Age: 81
End: 2021-03-01

## 2021-03-01 ENCOUNTER — APPOINTMENT (OUTPATIENT)
Dept: LAB | Facility: HOSPITAL | Age: 81
End: 2021-03-01

## 2021-03-01 VITALS
HEART RATE: 71 BPM | HEIGHT: 66 IN | OXYGEN SATURATION: 94 % | BODY MASS INDEX: 30.1 KG/M2 | WEIGHT: 187.3 LBS | TEMPERATURE: 98 F | RESPIRATION RATE: 16 BRPM | DIASTOLIC BLOOD PRESSURE: 77 MMHG | SYSTOLIC BLOOD PRESSURE: 159 MMHG

## 2021-03-01 DIAGNOSIS — C50.011 MALIGNANT NEOPLASM OF AREOLA OF RIGHT BREAST IN FEMALE, UNSPECIFIED ESTROGEN RECEPTOR STATUS (HCC): Primary | ICD-10-CM

## 2021-03-01 DIAGNOSIS — M81.0 OSTEOPOROSIS WITHOUT CURRENT PATHOLOGICAL FRACTURE, UNSPECIFIED OSTEOPOROSIS TYPE: ICD-10-CM

## 2021-03-01 PROCEDURE — 99214 OFFICE O/P EST MOD 30 MIN: CPT | Performed by: INTERNAL MEDICINE

## 2021-03-01 RX ORDER — BUPROPION HYDROCHLORIDE 150 MG/1
TABLET ORAL
COMMUNITY
Start: 2021-01-09 | End: 2022-06-06 | Stop reason: SDDI

## 2021-03-01 RX ORDER — POTASSIUM CHLORIDE 750 MG/1
TABLET, EXTENDED RELEASE ORAL
COMMUNITY
Start: 2021-01-09 | End: 2022-11-09 | Stop reason: HOSPADM

## 2021-04-21 ENCOUNTER — TELEPHONE (OUTPATIENT)
Dept: ONCOLOGY | Facility: CLINIC | Age: 81
End: 2021-04-21

## 2021-04-21 DIAGNOSIS — M81.0 OSTEOPOROSIS WITHOUT CURRENT PATHOLOGICAL FRACTURE, UNSPECIFIED OSTEOPOROSIS TYPE: Primary | ICD-10-CM

## 2021-04-21 DIAGNOSIS — C50.011 MALIGNANT NEOPLASM OF AREOLA OF RIGHT BREAST IN FEMALE, UNSPECIFIED ESTROGEN RECEPTOR STATUS (HCC): ICD-10-CM

## 2021-04-21 NOTE — TELEPHONE ENCOUNTER
Caller: AISHA    Relationship:  [facility]    Best call back number: [phone]    What orders are you requesting (i.e. lab or imaging): DEXA SCAN    In what timeframe would the patient need to come in: BEFORE 04/23    Where will you receive your lab/imaging services: [facility]    Additional notes: THEY RECEIVED ORDER FOR DEXA SCAN BUT IT WAS MISSING DR SIGNATURE, ASKING TO BE RE-FAXED WITH DR SIGNATURE: FAX# [phone]

## 2021-04-23 ENCOUNTER — HOSPITAL ENCOUNTER (OUTPATIENT)
Dept: BONE DENSITY | Facility: HOSPITAL | Age: 81
Discharge: HOME | End: 2021-04-23
Admitting: INTERNAL MEDICINE

## 2021-04-23 DIAGNOSIS — M81.0 OSTEOPOROSIS WITHOUT CURRENT PATHOLOGICAL FRACTURE, UNSPECIFIED OSTEOPOROSIS TYPE: ICD-10-CM

## 2021-04-23 PROCEDURE — 77080 DXA BONE DENSITY AXIAL: CPT

## 2021-05-24 ENCOUNTER — INFUSION (OUTPATIENT)
Dept: ONCOLOGY | Facility: HOSPITAL | Age: 81
End: 2021-05-24

## 2021-05-24 ENCOUNTER — OFFICE VISIT (OUTPATIENT)
Dept: ONCOLOGY | Facility: CLINIC | Age: 81
End: 2021-05-24

## 2021-05-24 ENCOUNTER — LAB (OUTPATIENT)
Dept: LAB | Facility: HOSPITAL | Age: 81
End: 2021-05-24

## 2021-05-24 VITALS
TEMPERATURE: 98 F | DIASTOLIC BLOOD PRESSURE: 85 MMHG | SYSTOLIC BLOOD PRESSURE: 152 MMHG | RESPIRATION RATE: 16 BRPM | HEART RATE: 58 BPM | WEIGHT: 186.2 LBS | HEIGHT: 66 IN | OXYGEN SATURATION: 94 % | BODY MASS INDEX: 29.92 KG/M2

## 2021-05-24 DIAGNOSIS — C50.011 MALIGNANT NEOPLASM OF AREOLA OF RIGHT BREAST IN FEMALE, UNSPECIFIED ESTROGEN RECEPTOR STATUS (HCC): ICD-10-CM

## 2021-05-24 DIAGNOSIS — M81.0 OSTEOPOROSIS WITHOUT CURRENT PATHOLOGICAL FRACTURE, UNSPECIFIED OSTEOPOROSIS TYPE: ICD-10-CM

## 2021-05-24 DIAGNOSIS — M81.0 OSTEOPOROSIS WITHOUT CURRENT PATHOLOGICAL FRACTURE, UNSPECIFIED OSTEOPOROSIS TYPE: Primary | ICD-10-CM

## 2021-05-24 DIAGNOSIS — M85.89 OSTEOPENIA OF MULTIPLE SITES: Primary | ICD-10-CM

## 2021-05-24 LAB
ALBUMIN SERPL-MCNC: 4.1 G/DL (ref 3.5–5.2)
ALBUMIN/GLOB SERPL: 1.4 G/DL (ref 1.1–2.4)
ALP SERPL-CCNC: 64 U/L (ref 38–116)
ALT SERPL W P-5'-P-CCNC: 24 U/L (ref 0–33)
ANION GAP SERPL CALCULATED.3IONS-SCNC: 12.1 MMOL/L (ref 5–15)
AST SERPL-CCNC: 30 U/L (ref 0–32)
BASOPHILS # BLD AUTO: 0.02 10*3/MM3 (ref 0–0.2)
BASOPHILS NFR BLD AUTO: 0.7 % (ref 0–1.5)
BILIRUB SERPL-MCNC: 0.6 MG/DL (ref 0.2–1.2)
BUN SERPL-MCNC: 8 MG/DL (ref 6–20)
BUN/CREAT SERPL: 13.3 (ref 7.3–30)
CALCIUM SPEC-SCNC: 9.3 MG/DL (ref 8.5–10.2)
CHLORIDE SERPL-SCNC: 96 MMOL/L (ref 98–107)
CO2 SERPL-SCNC: 25.9 MMOL/L (ref 22–29)
CREAT SERPL-MCNC: 0.6 MG/DL (ref 0.6–1.1)
DEPRECATED RDW RBC AUTO: 42.9 FL (ref 37–54)
EOSINOPHIL # BLD AUTO: 0.04 10*3/MM3 (ref 0–0.4)
EOSINOPHIL NFR BLD AUTO: 1.3 % (ref 0.3–6.2)
ERYTHROCYTE [DISTWIDTH] IN BLOOD BY AUTOMATED COUNT: 13.1 % (ref 12.3–15.4)
GFR SERPL CREATININE-BSD FRML MDRD: 96 ML/MIN/1.73
GLOBULIN UR ELPH-MCNC: 2.9 GM/DL (ref 1.8–3.5)
GLUCOSE SERPL-MCNC: 87 MG/DL (ref 74–124)
HCT VFR BLD AUTO: 39.8 % (ref 34–46.6)
HGB BLD-MCNC: 14 G/DL (ref 12–15.9)
IMM GRANULOCYTES # BLD AUTO: 0.01 10*3/MM3 (ref 0–0.05)
IMM GRANULOCYTES NFR BLD AUTO: 0.3 % (ref 0–0.5)
LYMPHOCYTES # BLD AUTO: 0.92 10*3/MM3 (ref 0.7–3.1)
LYMPHOCYTES NFR BLD AUTO: 30.4 % (ref 19.6–45.3)
MAGNESIUM SERPL-MCNC: 1.7 MG/DL (ref 1.8–2.5)
MCH RBC QN AUTO: 31.3 PG (ref 26.6–33)
MCHC RBC AUTO-ENTMCNC: 35.2 G/DL (ref 31.5–35.7)
MCV RBC AUTO: 89 FL (ref 79–97)
MONOCYTES # BLD AUTO: 0.41 10*3/MM3 (ref 0.1–0.9)
MONOCYTES NFR BLD AUTO: 13.5 % (ref 5–12)
NEUTROPHILS NFR BLD AUTO: 1.63 10*3/MM3 (ref 1.7–7)
NEUTROPHILS NFR BLD AUTO: 53.8 % (ref 42.7–76)
NRBC BLD AUTO-RTO: 0 /100 WBC (ref 0–0.2)
PHOSPHATE SERPL-MCNC: 3.7 MG/DL (ref 2.5–4.5)
PLATELET # BLD AUTO: 179 10*3/MM3 (ref 140–450)
PMV BLD AUTO: 9.2 FL (ref 6–12)
POTASSIUM SERPL-SCNC: 3.6 MMOL/L (ref 3.5–4.7)
PROT SERPL-MCNC: 7 G/DL (ref 6.3–8)
RBC # BLD AUTO: 4.47 10*6/MM3 (ref 3.77–5.28)
SODIUM SERPL-SCNC: 134 MMOL/L (ref 134–145)
WBC # BLD AUTO: 3.03 10*3/MM3 (ref 3.4–10.8)

## 2021-05-24 PROCEDURE — 83735 ASSAY OF MAGNESIUM: CPT

## 2021-05-24 PROCEDURE — 80053 COMPREHEN METABOLIC PANEL: CPT

## 2021-05-24 PROCEDURE — 25010000002 DENOSUMAB 60 MG/ML SOLUTION PREFILLED SYRINGE: Performed by: INTERNAL MEDICINE

## 2021-05-24 PROCEDURE — 85025 COMPLETE CBC W/AUTO DIFF WBC: CPT

## 2021-05-24 PROCEDURE — 99213 OFFICE O/P EST LOW 20 MIN: CPT | Performed by: INTERNAL MEDICINE

## 2021-05-24 PROCEDURE — 36415 COLL VENOUS BLD VENIPUNCTURE: CPT

## 2021-05-24 PROCEDURE — 96372 THER/PROPH/DIAG INJ SC/IM: CPT

## 2021-05-24 PROCEDURE — 84100 ASSAY OF PHOSPHORUS: CPT

## 2021-05-24 RX ORDER — FOLIC ACID 1 MG/1
1000 TABLET ORAL DAILY
COMMUNITY
Start: 2021-03-09

## 2021-05-24 RX ORDER — MIRTAZAPINE 30 MG/1
30 TABLET, FILM COATED ORAL
COMMUNITY
Start: 2021-03-09

## 2021-05-24 RX ADMIN — DENOSUMAB 60 MG: 60 INJECTION SUBCUTANEOUS at 14:50

## 2021-09-24 ENCOUNTER — IMMUNIZATION (OUTPATIENT)
Dept: VACCINE CLINIC | Facility: HOSPITAL | Age: 81
End: 2021-09-24

## 2021-09-24 PROCEDURE — 91300 HC SARSCOV02 VAC 30MCG/0.3ML IM: CPT | Performed by: INTERNAL MEDICINE

## 2021-09-24 PROCEDURE — 0003A: CPT | Performed by: INTERNAL MEDICINE

## 2021-09-24 PROCEDURE — 0001A: CPT | Performed by: INTERNAL MEDICINE

## 2021-11-29 ENCOUNTER — LAB (OUTPATIENT)
Dept: LAB | Facility: HOSPITAL | Age: 81
End: 2021-11-29

## 2021-11-29 ENCOUNTER — OFFICE VISIT (OUTPATIENT)
Dept: ONCOLOGY | Facility: CLINIC | Age: 81
End: 2021-11-29

## 2021-11-29 ENCOUNTER — INFUSION (OUTPATIENT)
Dept: ONCOLOGY | Facility: HOSPITAL | Age: 81
End: 2021-11-29

## 2021-11-29 VITALS
OXYGEN SATURATION: 96 % | TEMPERATURE: 98.6 F | SYSTOLIC BLOOD PRESSURE: 170 MMHG | HEIGHT: 66 IN | WEIGHT: 171.9 LBS | RESPIRATION RATE: 18 BRPM | BODY MASS INDEX: 27.63 KG/M2 | DIASTOLIC BLOOD PRESSURE: 92 MMHG | HEART RATE: 67 BPM

## 2021-11-29 DIAGNOSIS — M81.0 OSTEOPOROSIS WITHOUT CURRENT PATHOLOGICAL FRACTURE, UNSPECIFIED OSTEOPOROSIS TYPE: ICD-10-CM

## 2021-11-29 DIAGNOSIS — C50.011 MALIGNANT NEOPLASM OF AREOLA OF RIGHT BREAST IN FEMALE, UNSPECIFIED ESTROGEN RECEPTOR STATUS (HCC): ICD-10-CM

## 2021-11-29 DIAGNOSIS — M81.0 OSTEOPOROSIS WITHOUT CURRENT PATHOLOGICAL FRACTURE, UNSPECIFIED OSTEOPOROSIS TYPE: Primary | ICD-10-CM

## 2021-11-29 DIAGNOSIS — C50.011 MALIGNANT NEOPLASM OF AREOLA OF RIGHT BREAST IN FEMALE, UNSPECIFIED ESTROGEN RECEPTOR STATUS (HCC): Primary | ICD-10-CM

## 2021-11-29 LAB
ALBUMIN SERPL-MCNC: 4 G/DL (ref 3.5–5.2)
ALBUMIN/GLOB SERPL: 1.4 G/DL (ref 1.1–2.4)
ALP SERPL-CCNC: 63 U/L (ref 38–116)
ALT SERPL W P-5'-P-CCNC: 23 U/L (ref 0–33)
ANION GAP SERPL CALCULATED.3IONS-SCNC: 11.6 MMOL/L (ref 5–15)
AST SERPL-CCNC: 33 U/L (ref 0–32)
BASOPHILS # BLD AUTO: 0.03 10*3/MM3 (ref 0–0.2)
BASOPHILS NFR BLD AUTO: 1.1 % (ref 0–1.5)
BILIRUB SERPL-MCNC: 0.5 MG/DL (ref 0.2–1.2)
BUN SERPL-MCNC: 8 MG/DL (ref 6–20)
BUN/CREAT SERPL: 12.1 (ref 7.3–30)
CALCIUM SPEC-SCNC: 9.6 MG/DL (ref 8.5–10.2)
CHLORIDE SERPL-SCNC: 97 MMOL/L (ref 98–107)
CO2 SERPL-SCNC: 30.4 MMOL/L (ref 22–29)
CREAT SERPL-MCNC: 0.66 MG/DL (ref 0.6–1.1)
DEPRECATED RDW RBC AUTO: 45.2 FL (ref 37–54)
EOSINOPHIL # BLD AUTO: 0.06 10*3/MM3 (ref 0–0.4)
EOSINOPHIL NFR BLD AUTO: 2.2 % (ref 0.3–6.2)
ERYTHROCYTE [DISTWIDTH] IN BLOOD BY AUTOMATED COUNT: 13.4 % (ref 12.3–15.4)
GFR SERPL CREATININE-BSD FRML MDRD: 86 ML/MIN/1.73
GLOBULIN UR ELPH-MCNC: 2.8 GM/DL (ref 1.8–3.5)
GLUCOSE SERPL-MCNC: 76 MG/DL (ref 74–124)
HCT VFR BLD AUTO: 41.8 % (ref 34–46.6)
HGB BLD-MCNC: 14.3 G/DL (ref 12–15.9)
IMM GRANULOCYTES # BLD AUTO: 0.01 10*3/MM3 (ref 0–0.05)
IMM GRANULOCYTES NFR BLD AUTO: 0.4 % (ref 0–0.5)
LYMPHOCYTES # BLD AUTO: 0.95 10*3/MM3 (ref 0.7–3.1)
LYMPHOCYTES NFR BLD AUTO: 34.2 % (ref 19.6–45.3)
MAGNESIUM SERPL-MCNC: 1.9 MG/DL (ref 1.8–2.5)
MCH RBC QN AUTO: 31.1 PG (ref 26.6–33)
MCHC RBC AUTO-ENTMCNC: 34.2 G/DL (ref 31.5–35.7)
MCV RBC AUTO: 90.9 FL (ref 79–97)
MONOCYTES # BLD AUTO: 0.43 10*3/MM3 (ref 0.1–0.9)
MONOCYTES NFR BLD AUTO: 15.5 % (ref 5–12)
NEUTROPHILS NFR BLD AUTO: 1.3 10*3/MM3 (ref 1.7–7)
NEUTROPHILS NFR BLD AUTO: 46.6 % (ref 42.7–76)
NRBC BLD AUTO-RTO: 0 /100 WBC (ref 0–0.2)
PHOSPHATE SERPL-MCNC: 3.3 MG/DL (ref 2.5–4.5)
PLATELET # BLD AUTO: 183 10*3/MM3 (ref 140–450)
PMV BLD AUTO: 9.2 FL (ref 6–12)
POTASSIUM SERPL-SCNC: 3.9 MMOL/L (ref 3.5–4.7)
PROT SERPL-MCNC: 6.8 G/DL (ref 6.3–8)
RBC # BLD AUTO: 4.6 10*6/MM3 (ref 3.77–5.28)
SODIUM SERPL-SCNC: 139 MMOL/L (ref 134–145)
WBC NRBC COR # BLD: 2.78 10*3/MM3 (ref 3.4–10.8)

## 2021-11-29 PROCEDURE — 99213 OFFICE O/P EST LOW 20 MIN: CPT | Performed by: INTERNAL MEDICINE

## 2021-11-29 PROCEDURE — 84100 ASSAY OF PHOSPHORUS: CPT

## 2021-11-29 PROCEDURE — 36415 COLL VENOUS BLD VENIPUNCTURE: CPT

## 2021-11-29 PROCEDURE — 96372 THER/PROPH/DIAG INJ SC/IM: CPT

## 2021-11-29 PROCEDURE — 85025 COMPLETE CBC W/AUTO DIFF WBC: CPT

## 2021-11-29 PROCEDURE — 25010000002 DENOSUMAB 60 MG/ML SOLUTION PREFILLED SYRINGE: Performed by: INTERNAL MEDICINE

## 2021-11-29 PROCEDURE — 83735 ASSAY OF MAGNESIUM: CPT

## 2021-11-29 PROCEDURE — 80053 COMPREHEN METABOLIC PANEL: CPT

## 2021-11-29 RX ADMIN — DENOSUMAB 60 MG: 60 INJECTION SUBCUTANEOUS at 15:30

## 2021-11-29 NOTE — PROGRESS NOTES
Subjective .  Patient states she had no difficulty with Prolia and is willing to continue it.    REASONS FOR FOLLOWUP:  1. Stage I breast carcinoma, status post initiation of Arimidex on 12/15/2011.   2. ? Erythrocytosis in the setting of recent syncopal episode reviewed 01/13/2014.   3. JAK2 analysis negative, slow improvement per hemoglobin and hematocrit. Low erythropoietin level noted, vestibular rehab successful in reducing dizziness.   4. Erythrocytosis analyses negative for myeloproliferative disorder, improvement per blood pressure med ication adjustment noted.   5. Patient seen 12/17/2014, stable. A 12-month followup is planned/Arimidex anticipated until 2016.   6. Patient seen 01/19/2016, additional DEXA scan and followup assessment December 2016 with anticipated completion of Arimidex.   7. Patient reviewed December 20, 2016, Arimidex is continued, bone density with evidence of osteopenia  8. 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  9. Patient reviewed October 20, 2019, continued issues with her left eye recognized  10. Patient assessed February 28 2020 stable clinically, plans for subsequent bone density as she approaches 10 years from diagnosis   11. Patient assessed March 1, 2021, vitamin D increased to 3000 is daily, bone density scheduled April 2021,?  Prolia  12. Patient assessed 5/24/2021 with ongoing osteopenia, trial Prolia initiated.  13. Patient seen 11/29/2021, stable clinically, Prolia to continue.    History of Present Illness     Patient is an 81-year-old female with history of right-sided breast carcinoma. After delay initially, as a result of abscess petra-surgically, she was able to proceed with radiation therapy. We saw her late in December with bone jack burrows showing mild osteopenia. As a result of this, she began Arimidex at approximately the same time. She was seen on 3/26/12 doing well on her medication and completed radiation  therapy, and was actually improving in general at that point. We asked he r to continue Arimidex for an additional 4 months and seen on 8/9/12 doing well.       Thereafter she was asked to be reviewed again approximately six months later, 01/22/2013. She was having no difficulty with her Arimidex and we planned a yearly return.       She is now seen 01/13/2014 and though she it does not appear she is having trouble with the Arimidex, she does recognize two episodes of syncope that led to a hospitalization and adjustment of her blood pressure medications the last several months. She has no t had any additional episodes since last seen, but it is noted that her hemoglobin and hematocrit are increasing in levels.       The patient was reviewed 01/12/2013 with JAK2 analysis, in particular found to be negative for [id number] point mutation. Additional kevin dies, however, show a reduced erythropoietin level down to 5.4, normal iron studies, normal B12 studies, and normal serum chemistry.       The patient returns today, she states that she has gone on and been assessed for vestibular dysfunction, and through additional rehab and exercise she no longer has the dizziness that she had previously, in fact feels somewhat better than previous.       The patient when reviewed 03/12/2014 had gradual escalation of hemoglobin and hematocrit. 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 These were all normal. As the patient returns back today, 06/04/2014, she is feeling somewhat better overall as result of blood pressure medication adjustment and it is likely that hemoconcentration was the reason for her increasing hemoglobin and hematocrit levels.       The patient thereafter was asked to return for followup and is now seen in December 2014. She is doing very well overall with no additional symptoms.       The patient was asked to see him back now  reviewed 01/19/2016 doing well overall. She recognizes that she is very likely going to complete Arimidex by December of this year.    The patient is now reviewed December 20, 2016.  We plan to discontinue her Arimidex today.  A follow-up bone density was performed December 13 revealing evidence of osteopenia-lumbar T score -1.7 with a 4.6% interval decrease, left hip density mL neck with a T score -1.3 with a 4.4% interval decrease in a right hip density T score -1.7 with a 9.1% interval decrease.  We have discussed with discontinuance of her AI therapy that this should improve.   The patient is next seen January 22, 2018.  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  She is ordered had the same process involving her right eye though this improved with corneal transplant previously.  She follows with ophthalmology regularly.    The patient's next seen December 28, 2019.  Her left eye is to the point that she is having very poor vision and she has been told this nothing now second be done to improve this.  She still is able to function howeve  The patient is next seen February 28, 2020 fortunately doing about the same and approaching 10 years from diagnosis.  We discussed her previous bone density done in 2015 demonstrating osteopenia.  This will need follow-up.  The patient is seen March 1, 2021 and was to have a bone density that was rescheduled as result of the icy roads.  Her subsequent testing does include a low normal vitamin D level.  Fortunately she is feeling well remains quite active and is status post COVID-19 vaccination.  The patient's neck seen back 5/24/2021 having supplemented further her vitamin D and, fortunately, feeling well.  She did have a fall several months ago and fortunately did not sustain any fracture but feels her balance is suspect.  Her repeat bone density continues to demonstrate osteopenia and as result of history we discussed a trial of  Prolia which she is willing to undergo treatment with.  The patient went on to take Prolia and is next seen back 11/29/2021 indicating that she had no side effects from its use and is generally feeling fairly well.     Past Medical History:   Diagnosis Date   • Anxiety    • Cancer (HCC)     Stage I, right breast cancer   • Depression    • Disease of thyroid gland     hypothyroidusm   • Erythrocytosis    • Hypercholesterolemia    • Hypertension    • Osteoporosis     osteopenia mild       ONCOLOGIC HISTORY:  (History from previous dates can be found in the separate document.)    Current Outpatient Medications on File Prior to Visit   Medication Sig Dispense Refill   • amLODIPine (NORVASC) 5 MG tablet Take  by mouth Daily.     • atorvastatin (LIPITOR) 40 MG tablet Take  by mouth Daily.     • buPROPion XL (WELLBUTRIN XL) 150 MG 24 hr tablet      • buPROPion XL (WELLBUTRIN XL) 300 MG 24 hr tablet Take  by mouth.     • Cholecalciferol 25 MCG (1000 UT) capsule Take 3 capsules by mouth Daily. 90 capsule 11   • folic acid (FOLVITE) 1 MG tablet Take 1,000 mcg by mouth Daily.     • KLOR-CON 10 MEQ CR tablet      • levothyroxine (LEVOXYL) 100 MCG tablet Take  by mouth Daily.     • metoprolol tartrate (LOPRESSOR) 25 MG tablet      • mirtazapine (REMERON) 30 MG tablet Take 30 mg by mouth every night at bedtime.     • rosuvastatin (CRESTOR) 20 MG tablet Take 20 mg by mouth Daily.     • triamterene-hydrochlorothiazide (DYAZIDE) 37.5-25 MG per capsule Take  by mouth.     • venlafaxine XR (EFFEXOR-XR) 75 MG 24 hr capsule Take  by mouth Daily.       No current facility-administered medications on file prior to visit.       ALLERGIES:     Allergies   Allergen Reactions   • Ciprofloxacin Unknown - Low Severity       Social History     Socioeconomic History   • Marital status:      Spouse name: Sami   Tobacco Use   • Smoking status: Never Smoker   • Smokeless tobacco: Never Used   Substance and Sexual Activity   • Alcohol use:  "Yes     Comment: 3-5 times per week   • Drug use: No   • Sexual activity: Defer         Cancer-related family history includes Brain cancer in her father; Breast cancer in her maternal aunt, paternal aunt, and another family member; Breast cancer (age of onset: 43) in her sister; Cancer in her maternal aunt; Liver cancer in her father.     Review of Systems   Constitutional: Negative for fatigue.   Respiratory: Negative for chest tightness, shortness of breath and wheezing.    Gastrointestinal: Negative for abdominal pain, constipation, diarrhea, nausea and vomiting.   Neurological: Negative for weakness.   All other systems reviewed and are negative.    Objective      Vitals:    11/29/21 1446   BP: 170/92   Pulse: 67   Resp: 18   Temp: 98.6 °F (37 °C)   TempSrc: Temporal   SpO2: 96%   Weight: 78 kg (171 lb 14.4 oz)   Height: 167.9 cm (66.1\")   PainSc: 0-No pain     Current Status 11/29/2021   ECOG score 0       Physical Exam      GENERAL: Well-developed, well-nourished female in no acute distress.   SKIN: Warm, dry without rashes, purpura or petechiae.   HEAD: Normocephalic.   EYES: Pupils equal, round and reactive to light. EOMs intact. Conjunctivae normal.   EARS: Hearing intact.   NOSE: Septum midline. No excoriations or nasal discharge.   MOUTH: Tongue is well-papillated; no stomatitis or ulcers. Lips normal.   THROAT: Oropharynx without lesions or exudates.   NECK: Supple with good range of motion; no thyromegaly or masses, no JVD or bruits.   LYMPHATICS: No cervical, supraclavicular, axillary or inguinal adenopathy.   CHEST: Lungs clear to percussion and auscultation.   CARDIAC: Regular rate and rhythm without murmurs, rubs or gallops.   ABDOMEN: Soft, nontender with no organomegaly or masses.   EXTREMITIES: No clubbing, cyanosis or edema.   NEUROLOGICAL: No focal neurological deficits.       RECENT LABS:  Hematology WBC   Date Value Ref Range Status   11/29/2021 2.78 (L) 3.40 - 10.80 10*3/mm3 Final "   08/25/2021 3.74 (L) 4.5 - 11.0 10*3/uL Final     RBC   Date Value Ref Range Status   11/29/2021 4.60 3.77 - 5.28 10*6/mm3 Final   08/25/2021 4.35 4.0 - 5.2 10*6/uL Final     Hemoglobin   Date Value Ref Range Status   11/29/2021 14.3 12.0 - 15.9 g/dL Final   08/25/2021 13.4 12.0 - 16.0 g/dL Final     Hematocrit   Date Value Ref Range Status   11/29/2021 41.8 34.0 - 46.6 % Final   08/25/2021 39.6 36.0 - 46.0 % Final     Platelets   Date Value Ref Range Status   11/29/2021 183 140 - 450 10*3/mm3 Final   08/25/2021 190 140 - 440 10*3/uL Final      BONE MINERAL DENSITOMETRY 4/23/2021    FINDINGS:   LUMBAR SPINE:  The BMD measured in the L1-L4 is 0.963 g/cm2 for a  T-score of -0.8 and a Z-score of 1.9.     LEFT HIP: The BMD for the femoral neck is 0.750g/cm2 for a T score of   -0.9 and a Z score of 1.4.     RIGHT HIP:  The BMD for the femoral neck is 0.675g/cm2 for a T score of  -1.6 and a Z score of 0.8.     IMPRESSION:  1. Osteopenia.  2. When compared to the prior exam 12/13/2016, there has been no  statistically significant change in bone density of the right femoral  neck or left total hip.  3. The 10-year FRAX (World Health Organization) fracture risk for a  major osteoporotic fracture is 13% and for a hip fracture is 3.2%.     This report was finalized on 4/23/2021 3:42 PM by Dr. Zac Slaughter M.D.      Assessment/Plan   81-year-old female with:   1. Stage I breast carcinoma, status post initiation of Arimidex on 12/15/2011. She is tolerated this well and the drug was discontinued December 2016.  2. Erythrocytosis; consistent with hypertension and treatment thereof. Her current labs studies are   acceptable.   3.  Osteopenia-recent bone density 4/23/2021 continues to demonstrate osteopenia unchanged from 12/13/2/16.  We have discussed a trial of Prolia which will be initiated 5/24/2021.  A follow-up dose will be given 11/29/2021 patient seen back in 6 months.  Her subsequent bone density will not be necessary  now until late April 2023.

## 2022-06-06 ENCOUNTER — LAB (OUTPATIENT)
Dept: LAB | Facility: HOSPITAL | Age: 82
End: 2022-06-06

## 2022-06-06 ENCOUNTER — INFUSION (OUTPATIENT)
Dept: ONCOLOGY | Facility: HOSPITAL | Age: 82
End: 2022-06-06

## 2022-06-06 ENCOUNTER — OFFICE VISIT (OUTPATIENT)
Dept: ONCOLOGY | Facility: CLINIC | Age: 82
End: 2022-06-06

## 2022-06-06 VITALS
WEIGHT: 162.8 LBS | DIASTOLIC BLOOD PRESSURE: 88 MMHG | OXYGEN SATURATION: 99 % | RESPIRATION RATE: 16 BRPM | TEMPERATURE: 97.7 F | SYSTOLIC BLOOD PRESSURE: 161 MMHG | HEIGHT: 66 IN | HEART RATE: 55 BPM | BODY MASS INDEX: 26.16 KG/M2

## 2022-06-06 DIAGNOSIS — M81.0 OSTEOPOROSIS WITHOUT CURRENT PATHOLOGICAL FRACTURE, UNSPECIFIED OSTEOPOROSIS TYPE: ICD-10-CM

## 2022-06-06 DIAGNOSIS — C50.011 MALIGNANT NEOPLASM OF AREOLA OF RIGHT BREAST IN FEMALE, UNSPECIFIED ESTROGEN RECEPTOR STATUS: ICD-10-CM

## 2022-06-06 DIAGNOSIS — M81.0 OSTEOPOROSIS WITHOUT CURRENT PATHOLOGICAL FRACTURE, UNSPECIFIED OSTEOPOROSIS TYPE: Primary | ICD-10-CM

## 2022-06-06 LAB
ALBUMIN SERPL-MCNC: 3.7 G/DL (ref 3.5–5.2)
ALBUMIN/GLOB SERPL: 1.5 G/DL (ref 1.1–2.4)
ALP SERPL-CCNC: 51 U/L (ref 38–116)
ALT SERPL W P-5'-P-CCNC: 18 U/L (ref 0–33)
ANION GAP SERPL CALCULATED.3IONS-SCNC: 12.7 MMOL/L (ref 5–15)
AST SERPL-CCNC: 28 U/L (ref 0–32)
BASOPHILS # BLD AUTO: 0.03 10*3/MM3 (ref 0–0.2)
BASOPHILS NFR BLD AUTO: 1 % (ref 0–1.5)
BILIRUB SERPL-MCNC: 0.4 MG/DL (ref 0.2–1.2)
BUN SERPL-MCNC: 7 MG/DL (ref 6–20)
BUN/CREAT SERPL: 10.8 (ref 7.3–30)
CALCIUM SPEC-SCNC: 9.2 MG/DL (ref 8.5–10.2)
CHLORIDE SERPL-SCNC: 103 MMOL/L (ref 98–107)
CO2 SERPL-SCNC: 26.3 MMOL/L (ref 22–29)
CREAT SERPL-MCNC: 0.65 MG/DL (ref 0.6–1.1)
DEPRECATED RDW RBC AUTO: 51 FL (ref 37–54)
EGFRCR SERPLBLD CKD-EPI 2021: 88.6 ML/MIN/1.73
EOSINOPHIL # BLD AUTO: 0.04 10*3/MM3 (ref 0–0.4)
EOSINOPHIL NFR BLD AUTO: 1.3 % (ref 0.3–6.2)
ERYTHROCYTE [DISTWIDTH] IN BLOOD BY AUTOMATED COUNT: 15.7 % (ref 12.3–15.4)
GLOBULIN UR ELPH-MCNC: 2.5 GM/DL (ref 1.8–3.5)
GLUCOSE SERPL-MCNC: 81 MG/DL (ref 74–124)
HCT VFR BLD AUTO: 38 % (ref 34–46.6)
HGB BLD-MCNC: 12.5 G/DL (ref 12–15.9)
IMM GRANULOCYTES # BLD AUTO: 0.02 10*3/MM3 (ref 0–0.05)
IMM GRANULOCYTES NFR BLD AUTO: 0.6 % (ref 0–0.5)
LYMPHOCYTES # BLD AUTO: 1.21 10*3/MM3 (ref 0.7–3.1)
LYMPHOCYTES NFR BLD AUTO: 38.7 % (ref 19.6–45.3)
MAGNESIUM SERPL-MCNC: 1.7 MG/DL (ref 1.8–2.5)
MCH RBC QN AUTO: 29.4 PG (ref 26.6–33)
MCHC RBC AUTO-ENTMCNC: 32.9 G/DL (ref 31.5–35.7)
MCV RBC AUTO: 89.4 FL (ref 79–97)
MONOCYTES # BLD AUTO: 0.52 10*3/MM3 (ref 0.1–0.9)
MONOCYTES NFR BLD AUTO: 16.6 % (ref 5–12)
NEUTROPHILS NFR BLD AUTO: 1.31 10*3/MM3 (ref 1.7–7)
NEUTROPHILS NFR BLD AUTO: 41.8 % (ref 42.7–76)
NRBC BLD AUTO-RTO: 0 /100 WBC (ref 0–0.2)
PHOSPHATE SERPL-MCNC: 3.6 MG/DL (ref 2.5–4.5)
PLATELET # BLD AUTO: 197 10*3/MM3 (ref 140–450)
PMV BLD AUTO: 9.2 FL (ref 6–12)
POTASSIUM SERPL-SCNC: 4.1 MMOL/L (ref 3.5–4.7)
PROT SERPL-MCNC: 6.2 G/DL (ref 6.3–8)
RBC # BLD AUTO: 4.25 10*6/MM3 (ref 3.77–5.28)
SODIUM SERPL-SCNC: 142 MMOL/L (ref 134–145)
WBC NRBC COR # BLD: 3.13 10*3/MM3 (ref 3.4–10.8)

## 2022-06-06 PROCEDURE — 80053 COMPREHEN METABOLIC PANEL: CPT

## 2022-06-06 PROCEDURE — 99213 OFFICE O/P EST LOW 20 MIN: CPT

## 2022-06-06 PROCEDURE — 36415 COLL VENOUS BLD VENIPUNCTURE: CPT

## 2022-06-06 PROCEDURE — 84100 ASSAY OF PHOSPHORUS: CPT

## 2022-06-06 PROCEDURE — 25010000002 DENOSUMAB 60 MG/ML SOLUTION PREFILLED SYRINGE

## 2022-06-06 PROCEDURE — 96372 THER/PROPH/DIAG INJ SC/IM: CPT

## 2022-06-06 PROCEDURE — 83735 ASSAY OF MAGNESIUM: CPT

## 2022-06-06 PROCEDURE — 85025 COMPLETE CBC W/AUTO DIFF WBC: CPT

## 2022-06-06 RX ADMIN — DENOSUMAB 60 MG: 60 INJECTION SUBCUTANEOUS at 13:59

## 2022-06-06 NOTE — PROGRESS NOTES
Subjective .  Patient states she had no difficulty with Prolia and is willing to continue it.    REASONS FOR FOLLOWUP:  1. Stage I breast carcinoma, status post initiation of Arimidex on 12/15/2011.   2. ? Erythrocytosis in the setting of recent syncopal episode reviewed 01/13/2014.   3. JAK2 analysis negative, slow improvement per hemoglobin and hematocrit. Low erythropoietin level noted, vestibular rehab successful in reducing dizziness.   4. Erythrocytosis analyses negative for myeloproliferative disorder, improvement per blood pressure med ication adjustment noted.   5. Patient seen 12/17/2014, stable. A 12-month followup is planned/Arimidex anticipated until 2016.   6. Patient seen 01/19/2016, additional DEXA scan and followup assessment December 2016 with anticipated completion of Arimidex.   7. Patient reviewed December 20, 2016, Arimidex is continued, bone density with evidence of osteopenia  8. 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  9. Patient reviewed October 20, 2019, continued issues with her left eye recognized  10. Patient assessed February 28 2020 stable clinically, plans for subsequent bone density as she approaches 10 years from diagnosis   11. Patient assessed March 1, 2021, vitamin D increased to 3000 is daily, bone density scheduled April 2021,?  Prolia  12. Patient assessed 5/24/2021 with ongoing osteopenia, trial Prolia initiated.  13. Patient seen 11/29/2021, stable clinically, Prolia to continue.    History of Present Illness     Patient is an 81-year-old female with history of right-sided breast carcinoma. After delay initially, as a result of abscess petra-surgically, she was able to proceed with radiation therapy. We saw her late in December with bone jack burrows showing mild osteopenia. As a result of this, she began Arimidex at approximately the same time. She was seen on 3/26/12 doing well on her medication and completed radiation  therapy, and was actually improving in general at that point. We asked he r to continue Arimidex for an additional 4 months and seen on 8/9/12 doing well.       Thereafter she was asked to be reviewed again approximately six months later, 01/22/2013. She was having no difficulty with her Arimidex and we planned a yearly return.       She is now seen 01/13/2014 and though she it does not appear she is having trouble with the Arimidex, she does recognize two episodes of syncope that led to a hospitalization and adjustment of her blood pressure medications the last several months. She has no t had any additional episodes since last seen, but it is noted that her hemoglobin and hematocrit are increasing in levels.       The patient was reviewed 01/12/2013 with JAK2 analysis, in particular found to be negative for [id number] point mutation. Additional kevin dies, however, show a reduced erythropoietin level down to 5.4, normal iron studies, normal B12 studies, and normal serum chemistry.       The patient returns today, she states that she has gone on and been assessed for vestibular dysfunction, and through additional rehab and exercise she no longer has the dizziness that she had previously, in fact feels somewhat better than previous.       The patient when reviewed 03/12/2014 had gradual escalation of hemoglobin and hematocrit. 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 These were all normal. As the patient returns back today, 06/04/2014, she is feeling somewhat better overall as result of blood pressure medication adjustment and it is likely that hemoconcentration was the reason for her increasing hemoglobin and hematocrit levels.       The patient thereafter was asked to return for followup and is now seen in December 2014. She is doing very well overall with no additional symptoms.       The patient was asked to see him back now  reviewed 01/19/2016 doing well overall. She recognizes that she is very likely going to complete Arimidex by December of this year.    The patient is now reviewed December 20, 2016.  We plan to discontinue her Arimidex today.  A follow-up bone density was performed December 13 revealing evidence of osteopenia-lumbar T score -1.7 with a 4.6% interval decrease, left hip density mL neck with a T score -1.3 with a 4.4% interval decrease in a right hip density T score -1.7 with a 9.1% interval decrease.  We have discussed with discontinuance of her AI therapy that this should improve.   The patient is next seen January 22, 2018.  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  She is ordered had the same process involving her right eye though this improved with corneal transplant previously.  She follows with ophthalmology regularly.    The patient's next seen December 28, 2019.  Her left eye is to the point that she is having very poor vision and she has been told this nothing now second be done to improve this.  She still is able to function howeve  The patient is next seen February 28, 2020 fortunately doing about the same and approaching 10 years from diagnosis.  We discussed her previous bone density done in 2015 demonstrating osteopenia.  This will need follow-up.  The patient is seen March 1, 2021 and was to have a bone density that was rescheduled as result of the icy roads.  Her subsequent testing does include a low normal vitamin D level.  Fortunately she is feeling well remains quite active and is status post COVID-19 vaccination.  The patient's neck seen back 5/24/2021 having supplemented further her vitamin D and, fortunately, feeling well.  She did have a fall several months ago and fortunately did not sustain any fracture but feels her balance is suspect.  Her repeat bone density continues to demonstrate osteopenia and as result of history we discussed a trial of  Prolia which she is willing to undergo treatment with.  The patient went on to take Prolia and is next seen back 11/29/2021 indicating that she had no side effects from its use and is generally feeling fairly well.     Past Medical History:   Diagnosis Date   • Anxiety    • Cancer (HCC)     Stage I, right breast cancer   • Depression    • Disease of thyroid gland     hypothyroidusm   • Erythrocytosis    • Hypercholesterolemia    • Hypertension    • Osteoporosis     osteopenia mild       ONCOLOGIC HISTORY:  (History from previous dates can be found in the separate document.)    Current Outpatient Medications on File Prior to Visit   Medication Sig Dispense Refill   • amLODIPine (NORVASC) 5 MG tablet Take  by mouth Daily.     • atorvastatin (LIPITOR) 40 MG tablet Take  by mouth Daily.     • buPROPion XL (WELLBUTRIN XL) 300 MG 24 hr tablet Take  by mouth.     • folic acid (FOLVITE) 1 MG tablet Take 1,000 mcg by mouth Daily.     • KLOR-CON 10 MEQ CR tablet      • levothyroxine (SYNTHROID, LEVOTHROID) 100 MCG tablet Take  by mouth Daily.     • metoprolol tartrate (LOPRESSOR) 25 MG tablet      • mirtazapine (REMERON) 30 MG tablet Take 30 mg by mouth every night at bedtime.     • rosuvastatin (CRESTOR) 20 MG tablet Take 20 mg by mouth Daily.     • triamterene-hydrochlorothiazide (DYAZIDE) 37.5-25 MG per capsule Take  by mouth.     • venlafaxine XR (EFFEXOR-XR) 75 MG 24 hr capsule Take  by mouth Daily.     • [DISCONTINUED] buPROPion XL (WELLBUTRIN XL) 150 MG 24 hr tablet        No current facility-administered medications on file prior to visit.       ALLERGIES:     Allergies   Allergen Reactions   • Ciprofloxacin Unknown - Low Severity       Social History     Socioeconomic History   • Marital status:      Spouse name: Sami   Tobacco Use   • Smoking status: Never Smoker   • Smokeless tobacco: Never Used   Substance and Sexual Activity   • Alcohol use: Yes     Comment: 3-5 times per week   • Drug use: No   •  "Sexual activity: Defer         Cancer-related family history includes Brain cancer in her father; Breast cancer in her maternal aunt, paternal aunt, and another family member; Breast cancer (age of onset: 43) in her sister; Cancer in her maternal aunt; Liver cancer in her father.     Review of Systems   Constitutional: Negative for fatigue.   Respiratory: Negative for chest tightness, shortness of breath and wheezing.    Gastrointestinal: Negative for abdominal pain, constipation, diarrhea, nausea and vomiting.   Neurological: Negative for weakness.   All other systems reviewed and are negative.    Objective      Vitals:    06/06/22 1312   BP: 161/88   Pulse: 55   Resp: 16   Temp: 97.7 °F (36.5 °C)   TempSrc: Temporal   SpO2: 99%   Weight: 73.8 kg (162 lb 12.8 oz)   Height: 167.9 cm (66.1\")   PainSc: 0-No pain     Current Status 6/6/2022   ECOG score 0       Physical Exam      GENERAL: Well-developed, well-nourished female in no acute distress.   SKIN: Warm, dry without rashes, purpura or petechiae.   HEAD: Normocephalic.   EYES: Pupils equal, round and reactive to light. EOMs intact. Conjunctivae normal.   EARS: Hearing intact.   NOSE: Septum midline. No excoriations or nasal discharge.   MOUTH: Tongue is well-papillated; no stomatitis or ulcers. Lips normal.   THROAT: Oropharynx without lesions or exudates.    LYMPHATICS: No cervical, supraclavicular, axillary or inguinal adenopathy.   CHEST: Lungs clear to percussion and auscultation.   CARDIAC: Regular rate and rhythm without murmurs, rubs or gallops.   ABDOMEN: Soft, nontender with no organomegaly or masses.   EXTREMITIES: No clubbing, cyanosis or edema.   NEUROLOGICAL: No focal neurological deficits.       RECENT LABS:  Hematology WBC   Date Value Ref Range Status   06/06/2022 3.13 (L) 3.40 - 10.80 10*3/mm3 Final   02/28/2022 3.05 (L) 4.5 - 11.0 10*3/uL Final     RBC   Date Value Ref Range Status   06/06/2022 4.25 3.77 - 5.28 10*6/mm3 Final   02/28/2022 4.44 " 4.0 - 5.2 10*6/uL Final     Hemoglobin   Date Value Ref Range Status   06/06/2022 12.5 12.0 - 15.9 g/dL Final   02/28/2022 14.0 12.0 - 16.0 g/dL Final     Hematocrit   Date Value Ref Range Status   06/06/2022 38.0 34.0 - 46.6 % Final   02/28/2022 41.5 36.0 - 46.0 % Final     Platelets   Date Value Ref Range Status   06/06/2022 197 140 - 450 10*3/mm3 Final   02/28/2022 262 140 - 440 10*3/uL Final      BONE MINERAL DENSITOMETRY 4/23/2021    FINDINGS:   LUMBAR SPINE:  The BMD measured in the L1-L4 is 0.963 g/cm2 for a  T-score of -0.8 and a Z-score of 1.9.     LEFT HIP: The BMD for the femoral neck is 0.750g/cm2 for a T score of   -0.9 and a Z score of 1.4.     RIGHT HIP:  The BMD for the femoral neck is 0.675g/cm2 for a T score of  -1.6 and a Z score of 0.8.     IMPRESSION:  1. Osteopenia.  2. When compared to the prior exam 12/13/2016, there has been no  statistically significant change in bone density of the right femoral  neck or left total hip.  3. The 10-year FRAX ([employer] Organization) fracture risk for a  major osteoporotic fracture is 13% and for a hip fracture is 3.2%.     This report was finalized on 4/23/2021 3:42 PM by Dr. Zac Slaughter M.D.      Assessment & Plan   81-year-old female with:   1. Stage I breast carcinoma, status post initiation of Arimidex on 12/15/2011. She is tolerated this well and the drug was discontinued December 2016.  2. Erythrocytosis; consistent with hypertension and treatment thereof. Her current labs studies are acceptable.   3.  Osteopenia-recent bone density 4/23/2021 continues to demonstrate osteopenia unchanged from 12/13/2/16.  We have discussed a trial of Prolia which will be initiated 5/24/2021.  A follow-up dose will be given 11/29/2021 patient seen back in 6 months.  Her subsequent bone density will not be necessary now until late April 2023.      *Plan for Prolia today.  Return in 6 months for MD follow-up Prolia

## 2022-07-23 ENCOUNTER — IMMUNIZATION (OUTPATIENT)
Dept: VACCINE CLINIC | Facility: HOSPITAL | Age: 82
End: 2022-07-23

## 2022-07-23 DIAGNOSIS — Z23 NEED FOR VACCINATION: Primary | ICD-10-CM

## 2022-07-23 PROCEDURE — 91305 HC SARSCOV2 VAC 30 MCG TRS-SUCR PFIZER: CPT | Performed by: INTERNAL MEDICINE

## 2022-07-23 PROCEDURE — 0054A HC ADM SARSCV2 30MCG TRS-SUCR BOOSTER: CPT | Performed by: INTERNAL MEDICINE

## 2022-09-06 ENCOUNTER — TELEPHONE (OUTPATIENT)
Dept: ONCOLOGY | Facility: CLINIC | Age: 82
End: 2022-09-06

## 2022-09-06 NOTE — TELEPHONE ENCOUNTER
Dr. Sanchez's office is calling in regards to patient's WBC results.  1.81. on 9/2/2022, on 8/20/2022 the result was 1.79.  Wants to know if Dr. Knox wants to see the patient sooner than her scheduled December appointment.

## 2022-09-06 NOTE — TELEPHONE ENCOUNTER
Calling about pt's WBC on 9-2- it was 1.81  On 8-30 it was 1.79.  Wants Dr Knox aware and does he need to see pt sooner than December.

## 2022-09-07 DIAGNOSIS — C50.011 MALIGNANT NEOPLASM OF AREOLA OF RIGHT BREAST IN FEMALE, UNSPECIFIED ESTROGEN RECEPTOR STATUS: Primary | ICD-10-CM

## 2022-09-12 ENCOUNTER — LAB (OUTPATIENT)
Dept: LAB | Facility: HOSPITAL | Age: 82
End: 2022-09-12

## 2022-09-12 ENCOUNTER — CLINICAL SUPPORT (OUTPATIENT)
Dept: ONCOLOGY | Facility: HOSPITAL | Age: 82
End: 2022-09-12

## 2022-09-12 DIAGNOSIS — C50.011 MALIGNANT NEOPLASM OF AREOLA OF RIGHT BREAST IN FEMALE, UNSPECIFIED ESTROGEN RECEPTOR STATUS: ICD-10-CM

## 2022-09-12 LAB
BASOPHILS # BLD AUTO: 0.02 10*3/MM3 (ref 0–0.2)
BASOPHILS NFR BLD AUTO: 1 % (ref 0–1.5)
DEPRECATED RDW RBC AUTO: 55.8 FL (ref 37–54)
EOSINOPHIL # BLD AUTO: 0.04 10*3/MM3 (ref 0–0.4)
EOSINOPHIL NFR BLD AUTO: 2 % (ref 0.3–6.2)
ERYTHROCYTE [DISTWIDTH] IN BLOOD BY AUTOMATED COUNT: 16.7 % (ref 12.3–15.4)
HCT VFR BLD AUTO: 31.9 % (ref 34–46.6)
HGB BLD-MCNC: 11.3 G/DL (ref 12–15.9)
IMM GRANULOCYTES # BLD AUTO: 0.04 10*3/MM3 (ref 0–0.05)
IMM GRANULOCYTES NFR BLD AUTO: 2 % (ref 0–0.5)
LYMPHOCYTES # BLD AUTO: 0.83 10*3/MM3 (ref 0.7–3.1)
LYMPHOCYTES NFR BLD AUTO: 40.7 % (ref 19.6–45.3)
MCH RBC QN AUTO: 32.8 PG (ref 26.6–33)
MCHC RBC AUTO-ENTMCNC: 35.4 G/DL (ref 31.5–35.7)
MCV RBC AUTO: 92.5 FL (ref 79–97)
MONOCYTES # BLD AUTO: 0.45 10*3/MM3 (ref 0.1–0.9)
MONOCYTES NFR BLD AUTO: 22.1 % (ref 5–12)
NEUTROPHILS NFR BLD AUTO: 0.66 10*3/MM3 (ref 1.7–7)
NEUTROPHILS NFR BLD AUTO: 32.2 % (ref 42.7–76)
NRBC BLD AUTO-RTO: 0 /100 WBC (ref 0–0.2)
PLATELET # BLD AUTO: 179 10*3/MM3 (ref 140–450)
PMV BLD AUTO: 9.9 FL (ref 6–12)
RBC # BLD AUTO: 3.45 10*6/MM3 (ref 3.77–5.28)
WBC NRBC COR # BLD: 2.04 10*3/MM3 (ref 3.4–10.8)

## 2022-09-12 PROCEDURE — 85025 COMPLETE CBC W/AUTO DIFF WBC: CPT

## 2022-09-12 PROCEDURE — 36415 COLL VENOUS BLD VENIPUNCTURE: CPT

## 2022-09-12 NOTE — PROGRESS NOTES
Pt present for visit with family member. Voices no new concerns. Denies recent fever, chills, s/s of infection or virus. The pt was brought into the office today to check on her recent low WBC count found by her PCP. Message sent to Dr. Knox to review and advise.    Lab Results   Component Value Date    WBC 2.04 (L) 09/12/2022    HGB 11.3 (L) 09/12/2022    HCT 31.9 (L) 09/12/2022    MCV 92.5 09/12/2022     09/12/2022

## 2022-10-03 DIAGNOSIS — C50.011 MALIGNANT NEOPLASM OF AREOLA OF RIGHT BREAST IN FEMALE, UNSPECIFIED ESTROGEN RECEPTOR STATUS: Primary | ICD-10-CM

## 2022-10-04 NOTE — PROGRESS NOTES
Subjective .  Patient states she had no difficulty with Prolia and is willing to continue it.    REASONS FOR FOLLOWUP:  1. Stage I breast carcinoma, status post initiation of Arimidex on 12/15/2011.   2. ? Erythrocytosis in the setting of recent syncopal episode reviewed 01/13/2014.   3. JAK2 analysis negative, slow improvement per hemoglobin and hematocrit. Low erythropoietin level noted, vestibular rehab successful in reducing dizziness.   4. Erythrocytosis analyses negative for myeloproliferative disorder, improvement per blood pressure med ication adjustment noted.   5. Patient seen 12/17/2014, stable. A 12-month followup is planned/Arimidex anticipated until 2016.   6. Patient seen 01/19/2016, additional DEXA scan and followup assessment December 2016 with anticipated completion of Arimidex.   7. Patient reviewed December 20, 2016, Arimidex is continued, bone density with evidence of osteopenia  8. 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  9. Patient reviewed October 20, 2019, continued issues with her left eye recognized  10. Patient assessed February 28 2020 stable clinically, plans for subsequent bone density as she approaches 10 years from diagnosis   11. Patient assessed March 1, 2021, vitamin D increased to 3000 is daily, bone density scheduled April 2021,?  Prolia  12. Patient assessed 5/24/2021 with ongoing osteopenia, trial Prolia initiated.  13. Patient seen 11/29/2021, stable clinically, Prolia to continue.    History of Present Illness     Patient is an 81-year-old female with history of right-sided breast carcinoma. After delay initially, as a result of abscess petra-surgically, she was able to proceed with radiation therapy. We saw her late in December with bone jack burrows showing mild osteopenia. As a result of this, she began Arimidex at approximately the same time. She was seen on 3/26/12 doing well on her medication and completed radiation  therapy, and was actually improving in general at that point. We asked he r to continue Arimidex for an additional 4 months and seen on 8/9/12 doing well.       Thereafter she was asked to be reviewed again approximately six months later, 01/22/2013. She was having no difficulty with her Arimidex and we planned a yearly return.       She is now seen 01/13/2014 and though she it does not appear she is having trouble with the Arimidex, she does recognize two episodes of syncope that led to a hospitalization and adjustment of her blood pressure medications the last several months. She has no t had any additional episodes since last seen, but it is noted that her hemoglobin and hematocrit are increasing in levels.       The patient was reviewed 01/12/2013 with JAK2 analysis, in particular found to be negative for [id number] point mutation. Additional kevin dies, however, show a reduced erythropoietin level down to 5.4, normal iron studies, normal B12 studies, and normal serum chemistry.       The patient returns today, she states that she has gone on and been assessed for vestibular dysfunction, and through additional rehab and exercise she no longer has the dizziness that she had previously, in fact feels somewhat better than previous.       The patient when reviewed 03/12/2014 had gradual escalation of hemoglobin and hematocrit. 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 These were all normal. As the patient returns back today, 06/04/2014, she is feeling somewhat better overall as result of blood pressure medication adjustment and it is likely that hemoconcentration was the reason for her increasing hemoglobin and hematocrit levels.       The patient thereafter was asked to return for followup and is now seen in December 2014. She is doing very well overall with no additional symptoms.       The patient was asked to see him back now  reviewed 01/19/2016 doing well overall. She recognizes that she is very likely going to complete Arimidex by December of this year.    The patient is now reviewed December 20, 2016.  We plan to discontinue her Arimidex today.  A follow-up bone density was performed December 13 revealing evidence of osteopenia-lumbar T score -1.7 with a 4.6% interval decrease, left hip density mL neck with a T score -1.3 with a 4.4% interval decrease in a right hip density T score -1.7 with a 9.1% interval decrease.  We have discussed with discontinuance of her AI therapy that this should improve.   The patient is next seen January 22, 2018.  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  She is ordered had the same process involving her right eye though this improved with corneal transplant previously.  She follows with ophthalmology regularly.    The patient's next seen December 28, 2019.  Her left eye is to the point that she is having very poor vision and she has been told this nothing now second be done to improve this.  She still is able to function howeve  The patient is next seen February 28, 2020 fortunately doing about the same and approaching 10 years from diagnosis.  We discussed her previous bone density done in 2015 demonstrating osteopenia.  This will need follow-up.  The patient is seen March 1, 2021 and was to have a bone density that was rescheduled as result of the icy roads.  Her subsequent testing does include a low normal vitamin D level.  Fortunately she is feeling well remains quite active and is status post COVID-19 vaccination.  The patient's neck seen back 5/24/2021 having supplemented further her vitamin D and, fortunately, feeling well.  She did have a fall several months ago and fortunately did not sustain any fracture but feels her balance is suspect.  Her repeat bone density continues to demonstrate osteopenia and as result of history we discussed a trial of  Prolia which she is willing to undergo treatment with.  The patient went on to take Prolia and is next seen back 11/29/2021 indicating that she had no side effects from its use and is generally feeling fairly well.    The patient had follow-up with primary care with a slow reduction of her blood count particularly in the last 6 months and she is next seen 10/6/2022 with anemia, leukopenia (neutropenia) and degree of lymphocytosis.    She is seen back in office with her daughter and granddaughter all indicating that the patient is under considerable emotional distress with her  in the hospital with a series of post COVID complications.  She has not been eating well or consistently as result has lost approximately 5 pounds.  We have discussed that her findings hematologically are somewhat concerning and that indirect testing should least be initiated down to looking for nutritional causes or perhaps progression from her previous myeloproliferative disorder (recognized her history) to a mild dysplastic syndrome or even acute leukemia.     Past Medical History:   Diagnosis Date   • Anxiety    • Cancer (HCC)     Stage I, right breast cancer   • Depression    • Disease of thyroid gland     hypothyroidusm   • Erythrocytosis    • Hypercholesterolemia    • Hypertension    • Osteoporosis     osteopenia mild       ONCOLOGIC HISTORY:  (History from previous dates can be found in the separate document.)    Current Outpatient Medications on File Prior to Visit   Medication Sig Dispense Refill   • amLODIPine (NORVASC) 5 MG tablet Take  by mouth Daily.     • atorvastatin (LIPITOR) 40 MG tablet Take  by mouth Daily.     • buPROPion XL (WELLBUTRIN XL) 300 MG 24 hr tablet Take  by mouth.     • folic acid (FOLVITE) 1 MG tablet Take 1,000 mcg by mouth Daily.     • KLOR-CON 10 MEQ CR tablet      • levothyroxine (SYNTHROID, LEVOTHROID) 100 MCG tablet Take  by mouth Daily.     • metoprolol tartrate (LOPRESSOR) 25 MG tablet      •  "mirtazapine (REMERON) 30 MG tablet Take 30 mg by mouth every night at bedtime.     • rosuvastatin (CRESTOR) 20 MG tablet Take 20 mg by mouth Daily.     • triamterene-hydrochlorothiazide (DYAZIDE) 37.5-25 MG per capsule Take  by mouth.     • venlafaxine XR (EFFEXOR-XR) 75 MG 24 hr capsule Take  by mouth Daily.       No current facility-administered medications on file prior to visit.       ALLERGIES:     Allergies   Allergen Reactions   • Ciprofloxacin Unknown - Low Severity       Social History     Socioeconomic History   • Marital status:      Spouse name: Sami   Tobacco Use   • Smoking status: Never Smoker   • Smokeless tobacco: Never Used   Substance and Sexual Activity   • Alcohol use: Yes     Comment: 3-5 times per week   • Drug use: No   • Sexual activity: Defer         Cancer-related family history includes Brain cancer in her father; Breast cancer in her maternal aunt, paternal aunt, and another family member; Breast cancer (age of onset: 43) in her sister; Cancer in her maternal aunt; Liver cancer in her father.     Review of Systems   Constitutional: Negative for fatigue.   Respiratory: Negative for chest tightness, shortness of breath and wheezing.    Gastrointestinal: Negative for abdominal pain, constipation, diarrhea, nausea and vomiting.   Neurological: Negative for weakness.   All other systems reviewed and are negative.    Objective      Vitals:    10/06/22 1625   BP: 170/92   Pulse: 57   Resp: 18   Temp: 98.4 °F (36.9 °C)   TempSrc: Temporal   SpO2: 97%   Weight: 71.6 kg (157 lb 12.8 oz)   Height: 167.9 cm (66.1\")   PainSc: 0-No pain     Current Status 10/6/2022   ECOG score 0       Physical Exam      GENERAL: Well-developed, well-nourished female in no acute distress.   SKIN: Warm, dry without rashes, purpura or petechiae.   HEAD: Normocephalic.   EYES: Pupils equal, round and reactive to light. EOMs intact. Conjunctivae normal.   EARS: Hearing intact.   NOSE: Septum midline. No " excoriations or nasal discharge.   MOUTH: Tongue is well-papillated; no stomatitis or ulcers. Lips normal.   THROAT: Oropharynx without lesions or exudates.   NECK: Supple with good range of motion; no thyromegaly or masses, no JVD or bruits.   LYMPHATICS: No cervical, supraclavicular, axillary or inguinal adenopathy.   CHEST: Lungs clear to percussion and auscultation.   CARDIAC: Regular rate and rhythm without murmurs, rubs or gallops.   ABDOMEN: Soft, nontender with no organomegaly or masses.   EXTREMITIES: No clubbing, cyanosis or edema.   NEUROLOGICAL: No focal neurological deficits.       RECENT LABS:  Hematology WBC   Date Value Ref Range Status   10/06/2022 1.79 (L) 3.40 - 10.80 10*3/mm3 Final   02/28/2022 3.05 (L) 4.5 - 11.0 10*3/uL Final     RBC   Date Value Ref Range Status   10/06/2022 2.87 (L) 3.77 - 5.28 10*6/mm3 Final   02/28/2022 4.44 4.0 - 5.2 10*6/uL Final     Hemoglobin   Date Value Ref Range Status   10/06/2022 9.8 (L) 12.0 - 15.9 g/dL Final   02/28/2022 14.0 12.0 - 16.0 g/dL Final     Hematocrit   Date Value Ref Range Status   10/06/2022 28.5 (L) 34.0 - 46.6 % Final   02/28/2022 41.5 36.0 - 46.0 % Final     Platelets   Date Value Ref Range Status   10/06/2022 230 140 - 450 10*3/mm3 Final   02/28/2022 262 140 - 440 10*3/uL Final      BONE MINERAL DENSITOMETRY 4/23/2021    FINDINGS:   LUMBAR SPINE:  The BMD measured in the L1-L4 is 0.963 g/cm2 for a  T-score of -0.8 and a Z-score of 1.9.     LEFT HIP: The BMD for the femoral neck is 0.750g/cm2 for a T score of   -0.9 and a Z score of 1.4.     RIGHT HIP:  The BMD for the femoral neck is 0.675g/cm2 for a T score of  -1.6 and a Z score of 0.8.     IMPRESSION:  1. Osteopenia.  2. When compared to the prior exam 12/13/2016, there has been no  statistically significant change in bone density of the right femoral  neck or left total hip.  3. The 10-year FRAX (World Health Organization) fracture risk for a  major osteoporotic fracture is 13% and for a hip  fracture is 3.2%.     This report was finalized on 4/23/2021 3:42 PM by Dr. Zac Slaughter M.D.      Assessment & Plan   81-year-old female with:   1. Stage I breast carcinoma, status post initiation of Arimidex on 12/15/2011. She is tolerated this well and the drug was discontinued December 2016.  2. Erythrocytosis; consistent with hypertension and treatment thereof. Her current labs studies are   acceptable.     3.  Patient referred back for progressive leukopenia, reviewed 10/6/2022 with pancytopenia developing with nutritional losses as a result of ongoing emotional distress with her 's illness  · Plan to proceed with testing today including CMP, flow cytometry peripheral blood, ferritin, iron profile, folate, LDH, MMA B12 levels  · 1 to 2-week follow-up MD, potential bone marrow aspirate and biopsy to follow.  Patient, and her daughter, agree with plan and follow-up.      3.  Osteopenia-recent bone density 4/23/2021 continues to demonstrate osteopenia unchanged from 12/13/2/16.  We have discussed a trial of Prolia which will be initiated 5/24/2021.  A follow-up dose will be given 11/29/2021 patient seen back in 6 months.  Her subsequent bone density will not be necessary now until late April 2023.

## 2022-10-06 ENCOUNTER — OFFICE VISIT (OUTPATIENT)
Dept: ONCOLOGY | Facility: CLINIC | Age: 82
End: 2022-10-06

## 2022-10-06 ENCOUNTER — LAB (OUTPATIENT)
Dept: LAB | Facility: HOSPITAL | Age: 82
End: 2022-10-06

## 2022-10-06 VITALS
WEIGHT: 157.8 LBS | SYSTOLIC BLOOD PRESSURE: 170 MMHG | BODY MASS INDEX: 25.36 KG/M2 | OXYGEN SATURATION: 97 % | TEMPERATURE: 98.4 F | RESPIRATION RATE: 18 BRPM | HEIGHT: 66 IN | DIASTOLIC BLOOD PRESSURE: 92 MMHG | HEART RATE: 57 BPM

## 2022-10-06 DIAGNOSIS — D61.818 PANCYTOPENIA: Primary | ICD-10-CM

## 2022-10-06 DIAGNOSIS — C50.011 MALIGNANT NEOPLASM OF AREOLA OF RIGHT BREAST IN FEMALE, UNSPECIFIED ESTROGEN RECEPTOR STATUS: ICD-10-CM

## 2022-10-06 DIAGNOSIS — R71.0 PRECIPITOUS DROP IN HEMATOCRIT: ICD-10-CM

## 2022-10-06 LAB
ALBUMIN SERPL-MCNC: 4.2 G/DL (ref 3.5–5.2)
ALBUMIN/GLOB SERPL: 2.2 G/DL
ALP SERPL-CCNC: 49 U/L (ref 39–117)
ALT SERPL W P-5'-P-CCNC: 13 U/L (ref 1–33)
ANION GAP SERPL CALCULATED.3IONS-SCNC: 10 MMOL/L (ref 5–15)
AST SERPL-CCNC: 20 U/L (ref 1–32)
BASOPHILS # BLD AUTO: 0.02 10*3/MM3 (ref 0–0.2)
BASOPHILS NFR BLD AUTO: 1.1 % (ref 0–1.5)
BILIRUB SERPL-MCNC: 0.6 MG/DL (ref 0–1.2)
BUN SERPL-MCNC: 8 MG/DL (ref 8–23)
BUN/CREAT SERPL: 13.6 (ref 7–25)
CALCIUM SPEC-SCNC: 9.2 MG/DL (ref 8.6–10.5)
CHLORIDE SERPL-SCNC: 99 MMOL/L (ref 98–107)
CO2 SERPL-SCNC: 28 MMOL/L (ref 22–29)
CREAT SERPL-MCNC: 0.59 MG/DL (ref 0.57–1)
DEPRECATED RDW RBC AUTO: 63.3 FL (ref 37–54)
EGFRCR SERPLBLD CKD-EPI 2021: 90.7 ML/MIN/1.73
EOSINOPHIL # BLD AUTO: 0.04 10*3/MM3 (ref 0–0.4)
EOSINOPHIL NFR BLD AUTO: 2.2 % (ref 0.3–6.2)
ERYTHROCYTE [DISTWIDTH] IN BLOOD BY AUTOMATED COUNT: 17.7 % (ref 12.3–15.4)
FERRITIN SERPL-MCNC: 787 NG/ML (ref 13–150)
FOLATE SERPL-MCNC: >20 NG/ML (ref 4.78–24.2)
GLOBULIN UR ELPH-MCNC: 1.9 GM/DL
GLUCOSE SERPL-MCNC: 88 MG/DL (ref 65–99)
HCT VFR BLD AUTO: 28.5 % (ref 34–46.6)
HGB BLD-MCNC: 9.8 G/DL (ref 12–15.9)
IMM GRANULOCYTES # BLD AUTO: 0.02 10*3/MM3 (ref 0–0.05)
IMM GRANULOCYTES NFR BLD AUTO: 1.1 % (ref 0–0.5)
IRON 24H UR-MRATE: 44 MCG/DL (ref 37–145)
IRON SATN MFR SERPL: 12 % (ref 20–50)
LDH SERPL-CCNC: 160 U/L (ref 135–214)
LYMPHOCYTES # BLD AUTO: 0.85 10*3/MM3 (ref 0.7–3.1)
LYMPHOCYTES NFR BLD AUTO: 47.5 % (ref 19.6–45.3)
MCH RBC QN AUTO: 34.1 PG (ref 26.6–33)
MCHC RBC AUTO-ENTMCNC: 34.4 G/DL (ref 31.5–35.7)
MCV RBC AUTO: 99.3 FL (ref 79–97)
MONOCYTES # BLD AUTO: 0.47 10*3/MM3 (ref 0.1–0.9)
MONOCYTES NFR BLD AUTO: 26.3 % (ref 5–12)
NEUTROPHILS NFR BLD AUTO: 0.39 10*3/MM3 (ref 1.7–7)
NEUTROPHILS NFR BLD AUTO: 21.8 % (ref 42.7–76)
NRBC BLD AUTO-RTO: 0 /100 WBC (ref 0–0.2)
PLATELET # BLD AUTO: 230 10*3/MM3 (ref 140–450)
PMV BLD AUTO: 9.9 FL (ref 6–12)
POTASSIUM SERPL-SCNC: 3.6 MMOL/L (ref 3.5–5.2)
PROT SERPL-MCNC: 6.1 G/DL (ref 6–8.5)
RBC # BLD AUTO: 2.87 10*6/MM3 (ref 3.77–5.28)
SODIUM SERPL-SCNC: 137 MMOL/L (ref 136–145)
TIBC SERPL-MCNC: 362 MCG/DL (ref 298–536)
TRANSFERRIN SERPL-MCNC: 243 MG/DL (ref 200–360)
VIT B12 BLD-MCNC: 274 PG/ML (ref 211–946)
WBC NRBC COR # BLD: 1.79 10*3/MM3 (ref 3.4–10.8)

## 2022-10-06 PROCEDURE — 88185 FLOWCYTOMETRY/TC ADD-ON: CPT | Performed by: INTERNAL MEDICINE

## 2022-10-06 PROCEDURE — 99214 OFFICE O/P EST MOD 30 MIN: CPT | Performed by: INTERNAL MEDICINE

## 2022-10-06 PROCEDURE — 88184 FLOWCYTOMETRY/ TC 1 MARKER: CPT | Performed by: INTERNAL MEDICINE

## 2022-10-06 PROCEDURE — 85025 COMPLETE CBC W/AUTO DIFF WBC: CPT

## 2022-10-06 PROCEDURE — 84466 ASSAY OF TRANSFERRIN: CPT | Performed by: INTERNAL MEDICINE

## 2022-10-06 PROCEDURE — 83540 ASSAY OF IRON: CPT | Performed by: INTERNAL MEDICINE

## 2022-10-06 PROCEDURE — 82728 ASSAY OF FERRITIN: CPT | Performed by: INTERNAL MEDICINE

## 2022-10-06 PROCEDURE — 88182 CELL MARKER STUDY: CPT | Performed by: INTERNAL MEDICINE

## 2022-10-06 PROCEDURE — 80053 COMPREHEN METABOLIC PANEL: CPT | Performed by: INTERNAL MEDICINE

## 2022-10-06 PROCEDURE — 83615 LACTATE (LD) (LDH) ENZYME: CPT | Performed by: INTERNAL MEDICINE

## 2022-10-06 PROCEDURE — 82746 ASSAY OF FOLIC ACID SERUM: CPT | Performed by: INTERNAL MEDICINE

## 2022-10-06 PROCEDURE — 82607 VITAMIN B-12: CPT | Performed by: INTERNAL MEDICINE

## 2022-10-06 PROCEDURE — 36415 COLL VENOUS BLD VENIPUNCTURE: CPT

## 2022-10-10 LAB — METHYLMALONATE SERPL-SCNC: 261 NMOL/L (ref 0–378)

## 2022-10-11 ENCOUNTER — TELEPHONE (OUTPATIENT)
Dept: ONCOLOGY | Facility: CLINIC | Age: 82
End: 2022-10-11

## 2022-10-11 DIAGNOSIS — D61.818 PANCYTOPENIA: Primary | ICD-10-CM

## 2022-10-11 DIAGNOSIS — C50.011 MALIGNANT NEOPLASM OF AREOLA OF RIGHT BREAST IN FEMALE, UNSPECIFIED ESTROGEN RECEPTOR STATUS: ICD-10-CM

## 2022-10-11 LAB — REF LAB TEST METHOD: NORMAL

## 2022-10-11 NOTE — TELEPHONE ENCOUNTER
Margaux states pt is getting weaker and weaker. She can barely function at times. She is more unsteady on her feet. Margaux would like her to be seen before 10/24. Per Dr. Knox pt will be seen on 10/12 by a NP. Margaux V/KATH and message sent to scheduling.

## 2022-10-11 NOTE — TELEPHONE ENCOUNTER
Caller: RENE    Relationship: Child    Best call back number: [phone]    What is the best time to reach you: ANYTIME    Who are you requesting to speak with (clinical staff, provider,  specific staff member): DR JI OR NURSE    What was the call regarding: PT IS VERY WEAK. THEY WOULD LIKE TO SEE IF SHE CAN COME IN ASAP FOR A F/U APPT. PT HAS BEEN VERY EXHAUSTED NO MATTER HOW MUCH SLEEP SHE GETS ALSO.    PLEASE CALL TO SCHED APPT.     Do you require a callback: YES

## 2022-10-12 ENCOUNTER — TELEPHONE (OUTPATIENT)
Dept: ONCOLOGY | Facility: CLINIC | Age: 82
End: 2022-10-12

## 2022-10-12 NOTE — TELEPHONE ENCOUNTER
----- Message from Roberto Carlos Segal sent at 10/12/2022 10:39 AM EDT -----  Regarding: RE: Linda  SPOKE W/ PT AND LET HER KNOW NP AVAIL AT EP.  PT DECLINED AND SAYS SHE WILL KEEP APPT TO SEE DR. JI 10/24.  SHE SAID THAT HER DAUGHTERS CALLED TO SCHEDULE THE APPT BUT SHE DOES NOT NEED IT ANYMORE BECAUSE SHE FEELS OKAY.  THANK YOU   ----- Message -----  From: Bel Galaviz RN  Sent: 10/11/2022   4:20 PM EDT  To: Roberto Carlos Segal  Subject: Linda                                           Please schedule pt tomorrow 10/12 for labs and NP visit per Linda 2 units. Dwaine

## 2022-10-21 NOTE — PROGRESS NOTES
Subjective .     REASONS FOR FOLLOWUP:  1. Stage I breast carcinoma, status post initiation of Arimidex on 12/15/2011.   2. ? Erythrocytosis in the setting of recent syncopal episode reviewed 01/13/2014.   3. JAK2 analysis negative, slow improvement per hemoglobin and hematocrit. Low erythropoietin level noted, vestibular rehab successful in reducing dizziness.   4. Erythrocytosis analyses negative for myeloproliferative disorder, improvement per blood pressure med ication adjustment noted.   5. Patient seen 12/17/2014, stable. A 12-month followup is planned/Arimidex anticipated until 2016.   6. Patient seen 01/19/2016, additional DEXA scan and followup assessment December 2016 with anticipated completion of Arimidex.   7. Patient reviewed December 20, 2016, Arimidex is continued, bone density with evidence of osteopenia  8. 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  9. Patient reviewed October 20, 2019, continued issues with her left eye recognized  10. Patient assessed February 28 2020 stable clinically, plans for subsequent bone density as she approaches 10 years from diagnosis   11. Patient assessed March 1, 2021, vitamin D increased to 3000 is daily, bone density scheduled April 2021,?  Prolia  12. Patient assessed 5/24/2021 with ongoing osteopenia, trial Prolia initiated.  13. Patient seen 11/29/2021, stable clinically, Prolia to continue.    History of Present Illness     Patient is an 81-year-old female with history of right-sided breast carcinoma. After delay initially, as a result of abscess petra-surgically, she was able to proceed with radiation therapy. We saw her late in December with bone jack burrows showing mild osteopenia. As a result of this, she began Arimidex at approximately the same time. She was seen on 3/26/12 doing well on her medication and completed radiation therapy, and was actually improving in general at that point. We asked he r to  continue Arimidex for an additional 4 months and seen on 8/9/12 doing well.       Thereafter she was asked to be reviewed again approximately six months later, 01/22/2013. She was having no difficulty with her Arimidex and we planned a yearly return.       She is now seen 01/13/2014 and though she it does not appear she is having trouble with the Arimidex, she does recognize two episodes of syncope that led to a hospitalization and adjustment of her blood pressure medications the last several months. She has no t had any additional episodes since last seen, but it is noted that her hemoglobin and hematocrit are increasing in levels.       The patient was reviewed 01/12/2013 with JAK2 analysis, in particular found to be negative for [id number] point mutation. Additional kevin dies, however, show a reduced erythropoietin level down to 5.4, normal iron studies, normal B12 studies, and normal serum chemistry.       The patient returns today, she states that she has gone on and been assessed for vestibular dysfunction, and through additional rehab and exercise she no longer has the dizziness that she had previously, in fact feels somewhat better than previous.       The patient when reviewed 03/12/2014 had gradual escalation of hemoglobin and hematocrit. 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 These were all normal. As the patient returns back today, 06/04/2014, she is feeling somewhat better overall as result of blood pressure medication adjustment and it is likely that hemoconcentration was the reason for her increasing hemoglobin and hematocrit levels.       The patient thereafter was asked to return for followup and is now seen in December 2014. She is doing very well overall with no additional symptoms.       The patient was asked to see him back now reviewed 01/19/2016 doing well overall. She recognizes that she is very likely  going to complete Arimidex by December of this year.    The patient is now reviewed December 20, 2016.  We plan to discontinue her Arimidex today.  A follow-up bone density was performed December 13 revealing evidence of osteopenia-lumbar T score -1.7 with a 4.6% interval decrease, left hip density mL neck with a T score -1.3 with a 4.4% interval decrease in a right hip density T score -1.7 with a 9.1% interval decrease.  We have discussed with discontinuance of her AI therapy that this should improve.   The patient is next seen January 22, 2018.  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  She is ordered had the same process involving her right eye though this improved with corneal transplant previously.  She follows with ophthalmology regularly.    The patient's next seen December 28, 2019.  Her left eye is to the point that she is having very poor vision and she has been told this nothing now second be done to improve this.  She still is able to function howeve  The patient is next seen February 28, 2020 fortunately doing about the same and approaching 10 years from diagnosis.  We discussed her previous bone density done in 2015 demonstrating osteopenia.  This will need follow-up.  The patient is seen March 1, 2021 and was to have a bone density that was rescheduled as result of the icy roads.  Her subsequent testing does include a low normal vitamin D level.  Fortunately she is feeling well remains quite active and is status post COVID-19 vaccination.  The patient's neck seen back 5/24/2021 having supplemented further her vitamin D and, fortunately, feeling well.  She did have a fall several months ago and fortunately did not sustain any fracture but feels her balance is suspect.  Her repeat bone density continues to demonstrate osteopenia and as result of history we discussed a trial of Prolia which she is willing to undergo treatment with.  The patient went on to  take Prolia and is next seen back 11/29/2021 indicating that she had no side effects from its use and is generally feeling fairly well.    The patient had follow-up with primary care with a slow reduction of her blood count particularly in the last 6 months and she is next seen 10/6/2022 with anemia, leukopenia (neutropenia) and degree of lymphocytosis.    She is seen back in office with her daughter and granddaughter all indicating that the patient is under considerable emotional distress with her  in the hospital with a series of post COVID complications.  She has not been eating well or consistently as result has lost approximately 5 pounds.  We have discussed that her findings hematologically are somewhat concerning and that indirect testing should least be initiated down to looking for nutritional causes or perhaps progression from her previous myeloproliferative disorder (recognized her history) to a mild dysplastic syndrome or even acute leukemia.    The patient subsequent assessments included a normal folate, ferritin of 787, iron saturation of 12%, normal LDH, flow cytometry without evidence of acute leukemia or lymphoma, normal CMP.    The patient is contacted by telephone 10/24/2022 with indication that she has had a further decline.  Evidently she worsened with further weakness and cough development eventually presenting to hospital 10/20/2022 after being seen by her PCP and referred to the ER.  She tested negative for COVID, lactic acid was 2.5, creatinine 1.5, hemoglobin 9.1 and CT chest revealed dense right lower lobe consolidation with partial collapse compatible with pneumonia.  She received IV antibiotic therapies during hospitalization and apparently improved enough to be discharged home with oral Augmentin and Tessalon Perles.  Her daughter indicates that she is still quite weak and could not come to the office today as result.  Her breathing and general performance status have not changed  substantially however.     Past Medical History:   Diagnosis Date   • Anxiety    • Cancer (HCC)     Stage I, right breast cancer   • Depression    • Disease of thyroid gland     hypothyroidusm   • Erythrocytosis    • Hypercholesterolemia    • Hypertension    • Osteoporosis     osteopenia mild       ONCOLOGIC HISTORY:  (History from previous dates can be found in the separate document.)    Current Outpatient Medications on File Prior to Visit   Medication Sig Dispense Refill   • amLODIPine (NORVASC) 5 MG tablet Take  by mouth Daily.     • atorvastatin (LIPITOR) 40 MG tablet Take  by mouth Daily.     • buPROPion XL (WELLBUTRIN XL) 300 MG 24 hr tablet Take  by mouth.     • folic acid (FOLVITE) 1 MG tablet Take 1,000 mcg by mouth Daily.     • KLOR-CON 10 MEQ CR tablet      • levothyroxine (SYNTHROID, LEVOTHROID) 100 MCG tablet Take  by mouth Daily.     • metoprolol tartrate (LOPRESSOR) 25 MG tablet      • mirtazapine (REMERON) 30 MG tablet Take 30 mg by mouth every night at bedtime.     • rosuvastatin (CRESTOR) 20 MG tablet Take 20 mg by mouth Daily.     • triamterene-hydrochlorothiazide (DYAZIDE) 37.5-25 MG per capsule Take  by mouth.     • venlafaxine XR (EFFEXOR-XR) 75 MG 24 hr capsule Take  by mouth Daily.       No current facility-administered medications on file prior to visit.       ALLERGIES:     Allergies   Allergen Reactions   • Ciprofloxacin Unknown - Low Severity       Social History     Socioeconomic History   • Marital status:      Spouse name: Sami   Tobacco Use   • Smoking status: Never   • Smokeless tobacco: Never   Substance and Sexual Activity   • Alcohol use: Yes     Comment: 3-5 times per week   • Drug use: No   • Sexual activity: Defer         Cancer-related family history includes Brain cancer in her father; Breast cancer in her maternal aunt, paternal aunt, and another family member; Breast cancer (age of onset: 43) in her sister; Cancer in her maternal aunt; Liver cancer in her father.      Review of Systems   Constitutional: Positive for fatigue.   Respiratory: Positive for cough, chest tightness and shortness of breath. Negative for wheezing.    Gastrointestinal: Negative for abdominal pain, constipation, diarrhea, nausea and vomiting.   Neurological: Negative for weakness.   All other systems reviewed and are negative.    Objective      There were no vitals filed for this visit.  Current Status 10/6/2022   ECOG score 0        Physical Exam    Previous physical exam  GENERAL: Well-developed, well-nourished female in no acute distress.   SKIN: Warm, dry without rashes, purpura or petechiae.   HEAD: Normocephalic.   EYES: Pupils equal, round and reactive to light. EOMs intact. Conjunctivae normal.   EARS: Hearing intact.   NOSE: Septum midline. No excoriations or nasal discharge.   MOUTH: Tongue is well-papillated; no stomatitis or ulcers. Lips normal.   THROAT: Oropharynx without lesions or exudates.   NECK: Supple with good range of motion; no thyromegaly or masses, no JVD or bruits.   LYMPHATICS: No cervical, supraclavicular, axillary or inguinal adenopathy.   CHEST: Lungs clear to percussion and auscultation.   CARDIAC: Regular rate and rhythm without murmurs, rubs or gallops.   ABDOMEN: Soft, nontender with no organomegaly or masses.   EXTREMITIES: No clubbing, cyanosis or edema.   NEUROLOGICAL: No focal neurological deficits.       RECENT LABS:  Hematology WBC   Date Value Ref Range Status   10/06/2022 1.79 (L) 3.40 - 10.80 10*3/mm3 Final   02/28/2022 3.05 (L) 4.5 - 11.0 10*3/uL Final     RBC   Date Value Ref Range Status   10/06/2022 2.87 (L) 3.77 - 5.28 10*6/mm3 Final   02/28/2022 4.44 4.0 - 5.2 10*6/uL Final     Hemoglobin   Date Value Ref Range Status   10/06/2022 9.8 (L) 12.0 - 15.9 g/dL Final   02/28/2022 14.0 12.0 - 16.0 g/dL Final     Hematocrit   Date Value Ref Range Status   10/06/2022 28.5 (L) 34.0 - 46.6 % Final   02/28/2022 41.5 36.0 - 46.0 % Final     Platelets   Date Value Ref  Range Status   10/06/2022 230 140 - 450 10*3/mm3 Final   02/28/2022 262 140 - 440 10*3/uL Final          Assessment & Plan   81-year-old female with:   1. Stage I breast carcinoma, status post initiation of Arimidex on 12/15/2011. She is tolerated this well and the drug was discontinued December 2016.  2. Erythrocytosis; consistent with hypertension and treatment thereof. Her current labs studies are   acceptable.     3.  Patient referred back for progressive leukopenia, reviewed 10/6/2022 with pancytopenia developing with nutritional losses as a result of ongoing emotional distress with her 's illness  · Plan to proceed with testing today including CMP, flow cytometry peripheral blood, ferritin, iron profile, folate, LDH, MMA B12 levels  · 1 to 2-week follow-up MD, potential bone marrow aspirate and biopsy to follow.  Patient, and her daughter, agree with plan and follow-up.    2.  Subsequent laboratory studies without directive findings  · Patient hospitalized 10/20/2022 at [facility] with right lower lobe dense pneumonia treated with IV antibiotic therapy, discharged on Augmentin and Tessalon Perles  · Telephone visit 10/24/2022 with plans made for transfusion- hemoglobin 7.7 g% at discharge  · Follow-up visit 1 to 2 weeks, CBC, likely follow-up chest x-ray, potential marrow examination pending her repeat peripheral blood smear exam.      3.  Osteopenia-recent bone density 4/23/2021 continues to demonstrate osteopenia unchanged from 12/13/2/16.  We have discussed a trial of Prolia which will be initiated 5/24/2021.  A follow-up dose will be given 11/29/2021 patient seen back in 6 months.  Her subsequent bone density will not be necessary now until late April 2023.      You have chosen to receive care through a telephone visit today. Do you consent to use a telephone visit for your medical care today? Yes     This visit has been rescheduled as a phone visit to comply with patient safety concerns in  accordance with CDC recommendations. Total time of discussion was 23 minutes.    04141 is 5-10 minutes  12558 is 11-20 minutes  38497 is 21 or more minutes

## 2022-10-24 ENCOUNTER — APPOINTMENT (OUTPATIENT)
Dept: LAB | Facility: HOSPITAL | Age: 82
End: 2022-10-24

## 2022-10-24 ENCOUNTER — OFFICE VISIT (OUTPATIENT)
Dept: ONCOLOGY | Facility: CLINIC | Age: 82
End: 2022-10-24

## 2022-10-24 ENCOUNTER — APPOINTMENT (OUTPATIENT)
Dept: GENERAL RADIOLOGY | Facility: HOSPITAL | Age: 82
End: 2022-10-24

## 2022-10-24 DIAGNOSIS — C50.011 MALIGNANT NEOPLASM OF AREOLA OF RIGHT BREAST IN FEMALE, UNSPECIFIED ESTROGEN RECEPTOR STATUS: Primary | ICD-10-CM

## 2022-10-24 DIAGNOSIS — D61.818 PANCYTOPENIA: ICD-10-CM

## 2022-10-24 DIAGNOSIS — R71.0 PRECIPITOUS DROP IN HEMATOCRIT: ICD-10-CM

## 2022-10-24 PROCEDURE — 71045 X-RAY EXAM CHEST 1 VIEW: CPT

## 2022-10-24 PROCEDURE — 99443 PR PHYS/QHP TELEPHONE EVALUATION 21-30 MIN: CPT | Performed by: INTERNAL MEDICINE

## 2022-10-24 PROCEDURE — 93005 ELECTROCARDIOGRAM TRACING: CPT

## 2022-10-24 PROCEDURE — 99285 EMERGENCY DEPT VISIT HI MDM: CPT

## 2022-10-24 RX ORDER — SODIUM CHLORIDE 9 MG/ML
250 INJECTION, SOLUTION INTRAVENOUS AS NEEDED
Status: CANCELLED | OUTPATIENT
Start: 2022-10-24

## 2022-10-24 RX ORDER — DIPHENHYDRAMINE HCL 25 MG
25 CAPSULE ORAL ONCE
Status: CANCELLED | OUTPATIENT
Start: 2022-10-24 | End: 2022-10-24

## 2022-10-24 RX ORDER — ACETAMINOPHEN 325 MG/1
650 TABLET ORAL ONCE
Status: CANCELLED | OUTPATIENT
Start: 2022-10-24 | End: 2022-10-24

## 2022-10-24 RX ORDER — SODIUM CHLORIDE 0.9 % (FLUSH) 0.9 %
10 SYRINGE (ML) INJECTION AS NEEDED
Status: DISCONTINUED | OUTPATIENT
Start: 2022-10-24 | End: 2022-11-09 | Stop reason: HOSPADM

## 2022-10-25 ENCOUNTER — TELEPHONE (OUTPATIENT)
Dept: ONCOLOGY | Facility: CLINIC | Age: 82
End: 2022-10-25

## 2022-10-25 ENCOUNTER — APPOINTMENT (OUTPATIENT)
Dept: CARDIOLOGY | Facility: HOSPITAL | Age: 82
End: 2022-10-25

## 2022-10-25 ENCOUNTER — HOSPITAL ENCOUNTER (OUTPATIENT)
Dept: INFUSION THERAPY | Facility: HOSPITAL | Age: 82
Discharge: HOME | End: 2022-10-25

## 2022-10-25 ENCOUNTER — HOSPITAL ENCOUNTER (INPATIENT)
Facility: HOSPITAL | Age: 82
LOS: 13 days | Discharge: HOME HEALTH | End: 2022-11-09
Attending: EMERGENCY MEDICINE | Admitting: INTERNAL MEDICINE

## 2022-10-25 DIAGNOSIS — D61.818 PANCYTOPENIA: ICD-10-CM

## 2022-10-25 DIAGNOSIS — C50.011 MALIGNANT NEOPLASM OF AREOLA OF RIGHT BREAST IN FEMALE, UNSPECIFIED ESTROGEN RECEPTOR STATUS: ICD-10-CM

## 2022-10-25 DIAGNOSIS — R53.1 GENERALIZED WEAKNESS: ICD-10-CM

## 2022-10-25 DIAGNOSIS — I95.1 ORTHOSTATIC HYPOTENSION: ICD-10-CM

## 2022-10-25 DIAGNOSIS — R55 NEAR SYNCOPE: Primary | ICD-10-CM

## 2022-10-25 DIAGNOSIS — D64.9 ANEMIA, UNSPECIFIED TYPE: ICD-10-CM

## 2022-10-25 DIAGNOSIS — R71.0 PRECIPITOUS DROP IN HEMATOCRIT: ICD-10-CM

## 2022-10-25 PROBLEM — I10 HTN (HYPERTENSION): Status: ACTIVE | Noted: 2022-10-25

## 2022-10-25 PROBLEM — E87.1 HYPONATREMIA: Status: ACTIVE | Noted: 2022-10-25

## 2022-10-25 PROBLEM — E03.9 HYPOTHYROIDISM (ACQUIRED): Status: ACTIVE | Noted: 2022-10-25

## 2022-10-25 PROBLEM — E87.6 HYPOKALEMIA: Status: ACTIVE | Noted: 2022-10-25

## 2022-10-25 LAB
ALBUMIN SERPL-MCNC: 3.5 G/DL (ref 3.5–5.2)
ALBUMIN/GLOB SERPL: 1.1 G/DL
ALP SERPL-CCNC: 72 U/L (ref 39–117)
ALT SERPL W P-5'-P-CCNC: 14 U/L (ref 1–33)
ANION GAP SERPL CALCULATED.3IONS-SCNC: 15 MMOL/L (ref 5–15)
AORTIC ARCH: 2.7 CM
AORTIC DIMENSIONLESS INDEX: 0.8 (DI)
AST SERPL-CCNC: 18 U/L (ref 1–32)
BACTERIA UR QL AUTO: ABNORMAL /HPF
BASOPHILS # BLD AUTO: 0.02 10*3/MM3 (ref 0–0.2)
BASOPHILS NFR BLD AUTO: 0.6 % (ref 0–1.5)
BH CV ECHO MEAS - ACS: 2.17 CM
BH CV ECHO MEAS - AO MAX PG: 10.8 MMHG
BH CV ECHO MEAS - AO MEAN PG: 5.4 MMHG
BH CV ECHO MEAS - AO ROOT DIAM: 3.7 CM
BH CV ECHO MEAS - AO V2 MAX: 164.2 CM/SEC
BH CV ECHO MEAS - AO V2 VTI: 28.4 CM
BH CV ECHO MEAS - AVA(I,D): 2.07 CM2
BH CV ECHO MEAS - EDV(CUBED): 63.9 ML
BH CV ECHO MEAS - EDV(MOD-SP2): 37 ML
BH CV ECHO MEAS - EDV(MOD-SP4): 47 ML
BH CV ECHO MEAS - EF(MOD-BP): 60 %
BH CV ECHO MEAS - EF(MOD-SP2): 56.8 %
BH CV ECHO MEAS - EF(MOD-SP4): 63.8 %
BH CV ECHO MEAS - ESV(CUBED): 9.8 ML
BH CV ECHO MEAS - ESV(MOD-SP2): 16 ML
BH CV ECHO MEAS - ESV(MOD-SP4): 17 ML
BH CV ECHO MEAS - FS: 46.5 %
BH CV ECHO MEAS - IVS/LVPW: 0.89 CM
BH CV ECHO MEAS - IVSD: 0.96 CM
BH CV ECHO MEAS - LAT PEAK E' VEL: 8.8 CM/SEC
BH CV ECHO MEAS - LV MASS(C)D: 129.3 GRAMS
BH CV ECHO MEAS - LV MAX PG: 7.1 MMHG
BH CV ECHO MEAS - LV MEAN PG: 3.1 MMHG
BH CV ECHO MEAS - LV V1 MAX: 132.8 CM/SEC
BH CV ECHO MEAS - LV V1 VTI: 22.8 CM
BH CV ECHO MEAS - LVIDD: 4 CM
BH CV ECHO MEAS - LVIDS: 2.14 CM
BH CV ECHO MEAS - LVOT AREA: 2.6 CM2
BH CV ECHO MEAS - LVOT DIAM: 1.81 CM
BH CV ECHO MEAS - LVPWD: 1.07 CM
BH CV ECHO MEAS - MED PEAK E' VEL: 6 CM/SEC
BH CV ECHO MEAS - MV A DUR: 0.12 SEC
BH CV ECHO MEAS - MV A MAX VEL: 107.7 CM/SEC
BH CV ECHO MEAS - MV DEC SLOPE: 514 CM/SEC2
BH CV ECHO MEAS - MV DEC TIME: 241 MSEC
BH CV ECHO MEAS - MV E MAX VEL: 62.4 CM/SEC
BH CV ECHO MEAS - MV E/A: 0.58
BH CV ECHO MEAS - MV MAX PG: 6 MMHG
BH CV ECHO MEAS - MV MEAN PG: 2.31 MMHG
BH CV ECHO MEAS - MV P1/2T: 49.5 MSEC
BH CV ECHO MEAS - MV V2 VTI: 21 CM
BH CV ECHO MEAS - MVA(P1/2T): 4.4 CM2
BH CV ECHO MEAS - MVA(VTI): 2.8 CM2
BH CV ECHO MEAS - PA ACC TIME: 0.08 SEC
BH CV ECHO MEAS - PA PR(ACCEL): 43.3 MMHG
BH CV ECHO MEAS - PA V2 MAX: 101.8 CM/SEC
BH CV ECHO MEAS - PULM A REVS DUR: 0.13 SEC
BH CV ECHO MEAS - PULM A REVS VEL: 33.3 CM/SEC
BH CV ECHO MEAS - PULM DIAS VEL: 39.5 CM/SEC
BH CV ECHO MEAS - PULM S/D: 1.33
BH CV ECHO MEAS - PULM SYS VEL: 52.4 CM/SEC
BH CV ECHO MEAS - RV MAX PG: 2.6 MMHG
BH CV ECHO MEAS - RV V1 MAX: 80.2 CM/SEC
BH CV ECHO MEAS - RV V1 VTI: 14.3 CM
BH CV ECHO MEAS - SV(LVOT): 58.8 ML
BH CV ECHO MEAS - SV(MOD-SP2): 21 ML
BH CV ECHO MEAS - SV(MOD-SP4): 30 ML
BH CV ECHO MEAS - TAPSE (>1.6): 2.6 CM
BH CV ECHO MEASUREMENTS AVERAGE E/E' RATIO: 8.43
BH CV XLRA - RV BASE: 2.7 CM
BH CV XLRA - RV LENGTH: 5.6 CM
BH CV XLRA - RV MID: 2.04 CM
BH CV XLRA - TDI S': 14.9 CM/SEC
BILIRUB SERPL-MCNC: 0.3 MG/DL (ref 0–1.2)
BILIRUB UR QL STRIP: NEGATIVE
BUN SERPL-MCNC: 12 MG/DL (ref 8–23)
BUN/CREAT SERPL: 15.4 (ref 7–25)
CALCIUM SPEC-SCNC: 9.1 MG/DL (ref 8.6–10.5)
CHLORIDE SERPL-SCNC: 92 MMOL/L (ref 98–107)
CLARITY UR: CLEAR
CO2 SERPL-SCNC: 25 MMOL/L (ref 22–29)
COLOR UR: YELLOW
CREAT SERPL-MCNC: 0.78 MG/DL (ref 0.57–1)
DEPRECATED RDW RBC AUTO: 63.8 FL (ref 37–54)
EGFRCR SERPLBLD CKD-EPI 2021: 75.9 ML/MIN/1.73
EOSINOPHIL # BLD AUTO: 0.12 10*3/MM3 (ref 0–0.4)
EOSINOPHIL NFR BLD AUTO: 3.4 % (ref 0.3–6.2)
ERYTHROCYTE [DISTWIDTH] IN BLOOD BY AUTOMATED COUNT: 17.1 % (ref 12.3–15.4)
GLOBULIN UR ELPH-MCNC: 3.3 GM/DL
GLUCOSE BLDC GLUCOMTR-MCNC: 88 MG/DL (ref 70–130)
GLUCOSE SERPL-MCNC: 95 MG/DL (ref 65–99)
GLUCOSE UR STRIP-MCNC: NEGATIVE MG/DL
HCT VFR BLD AUTO: 28.1 % (ref 34–46.6)
HGB BLD-MCNC: 9 G/DL (ref 12–15.9)
HGB UR QL STRIP.AUTO: NEGATIVE
HOLD SPECIMEN: NORMAL
HOLD SPECIMEN: NORMAL
HYALINE CASTS UR QL AUTO: ABNORMAL /LPF
KETONES UR QL STRIP: NEGATIVE
LEFT ATRIUM VOLUME INDEX: 24.7 ML/M2
LEUKOCYTE ESTERASE UR QL STRIP.AUTO: ABNORMAL
LYMPHOCYTES # BLD AUTO: 0.94 10*3/MM3 (ref 0.7–3.1)
LYMPHOCYTES NFR BLD AUTO: 26.4 % (ref 19.6–45.3)
MAGNESIUM SERPL-MCNC: 2.4 MG/DL (ref 1.6–2.4)
MAXIMAL PREDICTED HEART RATE: 138 BPM
MCH RBC QN AUTO: 32.3 PG (ref 26.6–33)
MCHC RBC AUTO-ENTMCNC: 32 G/DL (ref 31.5–35.7)
MCV RBC AUTO: 100.7 FL (ref 79–97)
MONOCYTES # BLD AUTO: 0.83 10*3/MM3 (ref 0.1–0.9)
MONOCYTES NFR BLD AUTO: 23.3 % (ref 5–12)
NEUTROPHILS NFR BLD AUTO: 1.61 10*3/MM3 (ref 1.7–7)
NEUTROPHILS NFR BLD AUTO: 45.2 % (ref 42.7–76)
NITRITE UR QL STRIP: NEGATIVE
NT-PROBNP SERPL-MCNC: 312 PG/ML (ref 0–1800)
PH UR STRIP.AUTO: 6.5 [PH] (ref 5–8)
PLATELET # BLD AUTO: 406 10*3/MM3 (ref 140–450)
PMV BLD AUTO: 9.2 FL (ref 6–12)
POTASSIUM SERPL-SCNC: 3.4 MMOL/L (ref 3.5–5.2)
PROT SERPL-MCNC: 6.8 G/DL (ref 6–8.5)
PROT UR QL STRIP: ABNORMAL
QT INTERVAL: 436 MS
RBC # BLD AUTO: 2.79 10*6/MM3 (ref 3.77–5.28)
RBC # UR STRIP: ABNORMAL /HPF
REF LAB TEST METHOD: ABNORMAL
SODIUM SERPL-SCNC: 132 MMOL/L (ref 136–145)
SP GR UR STRIP: 1.01 (ref 1–1.03)
SQUAMOUS #/AREA URNS HPF: ABNORMAL /HPF
STRESS TARGET HR: 117 BPM
TROPONIN T SERPL-MCNC: <0.01 NG/ML (ref 0–0.03)
TSH SERPL DL<=0.05 MIU/L-ACNC: 7.61 UIU/ML (ref 0.27–4.2)
URATE SERPL-MCNC: 3.2 MG/DL (ref 2.4–5.7)
UROBILINOGEN UR QL STRIP: ABNORMAL
VIT B12 BLD-MCNC: 639 PG/ML (ref 211–946)
WBC # UR STRIP: ABNORMAL /HPF
WBC NRBC COR # BLD: 3.56 10*3/MM3 (ref 3.4–10.8)
WHOLE BLOOD HOLD COAG: NORMAL
WHOLE BLOOD HOLD SPECIMEN: NORMAL

## 2022-10-25 PROCEDURE — 83735 ASSAY OF MAGNESIUM: CPT

## 2022-10-25 PROCEDURE — 99214 OFFICE O/P EST MOD 30 MIN: CPT | Performed by: INTERNAL MEDICINE

## 2022-10-25 PROCEDURE — 83880 ASSAY OF NATRIURETIC PEPTIDE: CPT | Performed by: NURSE PRACTITIONER

## 2022-10-25 PROCEDURE — 85025 COMPLETE CBC W/AUTO DIFF WBC: CPT

## 2022-10-25 PROCEDURE — 93306 TTE W/DOPPLER COMPLETE: CPT

## 2022-10-25 PROCEDURE — G0378 HOSPITAL OBSERVATION PER HR: HCPCS

## 2022-10-25 PROCEDURE — 81001 URINALYSIS AUTO W/SCOPE: CPT

## 2022-10-25 PROCEDURE — 84443 ASSAY THYROID STIM HORMONE: CPT | Performed by: NURSE PRACTITIONER

## 2022-10-25 PROCEDURE — 80053 COMPREHEN METABOLIC PANEL: CPT

## 2022-10-25 PROCEDURE — 82607 VITAMIN B-12: CPT | Performed by: NURSE PRACTITIONER

## 2022-10-25 PROCEDURE — 84550 ASSAY OF BLOOD/URIC ACID: CPT | Performed by: NURSE PRACTITIONER

## 2022-10-25 PROCEDURE — 93306 TTE W/DOPPLER COMPLETE: CPT | Performed by: INTERNAL MEDICINE

## 2022-10-25 PROCEDURE — 93010 ELECTROCARDIOGRAM REPORT: CPT | Performed by: INTERNAL MEDICINE

## 2022-10-25 PROCEDURE — 82962 GLUCOSE BLOOD TEST: CPT

## 2022-10-25 PROCEDURE — 84484 ASSAY OF TROPONIN QUANT: CPT

## 2022-10-25 RX ORDER — CHOLECALCIFEROL (VITAMIN D3) 125 MCG
5 CAPSULE ORAL NIGHTLY PRN
Status: DISCONTINUED | OUTPATIENT
Start: 2022-10-25 | End: 2022-11-09 | Stop reason: HOSPADM

## 2022-10-25 RX ORDER — ERGOCALCIFEROL (VITAMIN D2) 10 MCG
2000 TABLET ORAL DAILY
COMMUNITY

## 2022-10-25 RX ORDER — FOLIC ACID 1 MG/1
1000 TABLET ORAL DAILY
Status: CANCELLED | OUTPATIENT
Start: 2022-10-25

## 2022-10-25 RX ORDER — LEVOTHYROXINE SODIUM 0.1 MG/1
100 TABLET ORAL DAILY
Status: CANCELLED | OUTPATIENT
Start: 2022-10-25

## 2022-10-25 RX ORDER — BUPROPION HYDROCHLORIDE 300 MG/1
300 TABLET ORAL DAILY
Status: CANCELLED | OUTPATIENT
Start: 2022-10-25

## 2022-10-25 RX ORDER — ATORVASTATIN CALCIUM 20 MG/1
40 TABLET, FILM COATED ORAL DAILY
Status: CANCELLED | OUTPATIENT
Start: 2022-10-25

## 2022-10-25 RX ADMIN — Medication 5 MG: at 23:29

## 2022-10-25 RX ADMIN — SODIUM CHLORIDE 500 ML: 9 INJECTION, SOLUTION INTRAVENOUS at 08:16

## 2022-10-25 NOTE — TELEPHONE ENCOUNTER
Called and s/w pt's daughter. She states pt is being admitted to [facility] now with weakness, dizziness and pneumonia. Daughter was asking about the blood transfusion that was scheduled for pt today. Advised her to speak with the pt's admitting MD to see if she can receive her blood while inpatient and to see if our office can be consulted on her. Also advised daughter RN would let Dr. Knox know about hospitalization. She v/u to all.     Reviewed with Dr. Knox, no new orders.

## 2022-10-25 NOTE — TELEPHONE ENCOUNTER
Caller: JASBIR JARRETT    Relationship: Child    Best call back number: [phone]      What was the call regarding: DAUGHTER CALLED WANTED TO LET DR JI KNOW THAT PATIENT IS AT [facility], INCASE HE WANTED TO SEE HER OR GET HER THE BLOOD TRANSFUSION     Do you require a callback: YES

## 2022-10-26 LAB
ABO GROUP BLD: NORMAL
ANION GAP SERPL CALCULATED.3IONS-SCNC: 12.1 MMOL/L (ref 5–15)
BLD GP AB SCN SERPL QL: NEGATIVE
BUN SERPL-MCNC: 7 MG/DL (ref 8–23)
BUN/CREAT SERPL: 12.5 (ref 7–25)
CALCIUM SPEC-SCNC: 8.6 MG/DL (ref 8.6–10.5)
CHLORIDE SERPL-SCNC: 99 MMOL/L (ref 98–107)
CO2 SERPL-SCNC: 25.9 MMOL/L (ref 22–29)
CREAT SERPL-MCNC: 0.56 MG/DL (ref 0.57–1)
DEPRECATED RDW RBC AUTO: 59.8 FL (ref 37–54)
EGFRCR SERPLBLD CKD-EPI 2021: 91.3 ML/MIN/1.73
ERYTHROCYTE [DISTWIDTH] IN BLOOD BY AUTOMATED COUNT: 17.2 % (ref 12.3–15.4)
FOLATE SERPL-MCNC: 16.4 NG/ML (ref 4.78–24.2)
GLUCOSE SERPL-MCNC: 84 MG/DL (ref 65–99)
HCT VFR BLD AUTO: 24.8 % (ref 34–46.6)
HGB BLD-MCNC: 8.4 G/DL (ref 12–15.9)
MCH RBC QN AUTO: 32.6 PG (ref 26.6–33)
MCHC RBC AUTO-ENTMCNC: 33.9 G/DL (ref 31.5–35.7)
MCV RBC AUTO: 96.1 FL (ref 79–97)
PLATELET # BLD AUTO: 346 10*3/MM3 (ref 140–450)
PMV BLD AUTO: 9.1 FL (ref 6–12)
POTASSIUM SERPL-SCNC: 3.1 MMOL/L (ref 3.5–5.2)
RBC # BLD AUTO: 2.58 10*6/MM3 (ref 3.77–5.28)
RH BLD: POSITIVE
SODIUM SERPL-SCNC: 137 MMOL/L (ref 136–145)
T&S EXPIRATION DATE: NORMAL
T4 FREE SERPL-MCNC: 1.11 NG/DL (ref 0.93–1.7)
TSH SERPL DL<=0.05 MIU/L-ACNC: 8.31 UIU/ML (ref 0.27–4.2)
WBC NRBC COR # BLD: 4.34 10*3/MM3 (ref 3.4–10.8)

## 2022-10-26 PROCEDURE — 80048 BASIC METABOLIC PNL TOTAL CA: CPT | Performed by: NURSE PRACTITIONER

## 2022-10-26 PROCEDURE — G0378 HOSPITAL OBSERVATION PER HR: HCPCS

## 2022-10-26 PROCEDURE — 99204 OFFICE O/P NEW MOD 45 MIN: CPT | Performed by: PHYSICIAN ASSISTANT

## 2022-10-26 PROCEDURE — 85027 COMPLETE CBC AUTOMATED: CPT | Performed by: NURSE PRACTITIONER

## 2022-10-26 PROCEDURE — P9016 RBC LEUKOCYTES REDUCED: HCPCS

## 2022-10-26 PROCEDURE — 86850 RBC ANTIBODY SCREEN: CPT | Performed by: INTERNAL MEDICINE

## 2022-10-26 PROCEDURE — 97110 THERAPEUTIC EXERCISES: CPT

## 2022-10-26 PROCEDURE — 84443 ASSAY THYROID STIM HORMONE: CPT | Performed by: NURSE PRACTITIONER

## 2022-10-26 PROCEDURE — 86901 BLOOD TYPING SEROLOGIC RH(D): CPT | Performed by: INTERNAL MEDICINE

## 2022-10-26 PROCEDURE — 82746 ASSAY OF FOLIC ACID SERUM: CPT | Performed by: NURSE PRACTITIONER

## 2022-10-26 PROCEDURE — 86900 BLOOD TYPING SEROLOGIC ABO: CPT

## 2022-10-26 PROCEDURE — 97162 PT EVAL MOD COMPLEX 30 MIN: CPT

## 2022-10-26 PROCEDURE — 99214 OFFICE O/P EST MOD 30 MIN: CPT | Performed by: INTERNAL MEDICINE

## 2022-10-26 PROCEDURE — 36430 TRANSFUSION BLD/BLD COMPNT: CPT

## 2022-10-26 PROCEDURE — 86901 BLOOD TYPING SEROLOGIC RH(D): CPT

## 2022-10-26 PROCEDURE — 36415 COLL VENOUS BLD VENIPUNCTURE: CPT | Performed by: INTERNAL MEDICINE

## 2022-10-26 PROCEDURE — 84439 ASSAY OF FREE THYROXINE: CPT | Performed by: NURSE PRACTITIONER

## 2022-10-26 PROCEDURE — 86923 COMPATIBILITY TEST ELECTRIC: CPT

## 2022-10-26 PROCEDURE — 63710000001 DIPHENHYDRAMINE PER 50 MG: Performed by: INTERNAL MEDICINE

## 2022-10-26 PROCEDURE — 86900 BLOOD TYPING SEROLOGIC ABO: CPT | Performed by: INTERNAL MEDICINE

## 2022-10-26 RX ORDER — ACETAMINOPHEN 325 MG/1
650 TABLET ORAL ONCE
Status: COMPLETED | OUTPATIENT
Start: 2022-10-26 | End: 2022-10-26

## 2022-10-26 RX ORDER — BUPROPION HYDROCHLORIDE 150 MG/1
150 TABLET ORAL DAILY
Status: DISCONTINUED | OUTPATIENT
Start: 2022-10-26 | End: 2022-11-09 | Stop reason: HOSPADM

## 2022-10-26 RX ORDER — DIPHENHYDRAMINE HCL 25 MG
25 CAPSULE ORAL ONCE
Status: COMPLETED | OUTPATIENT
Start: 2022-10-26 | End: 2022-10-26

## 2022-10-26 RX ORDER — MIRTAZAPINE 30 MG/1
30 TABLET, FILM COATED ORAL NIGHTLY
Status: DISCONTINUED | OUTPATIENT
Start: 2022-10-26 | End: 2022-11-09 | Stop reason: HOSPADM

## 2022-10-26 RX ORDER — DIPHENHYDRAMINE HCL 25 MG
25 CAPSULE ORAL ONCE
Status: DISCONTINUED | OUTPATIENT
Start: 2022-10-26 | End: 2022-11-09 | Stop reason: HOSPADM

## 2022-10-26 RX ORDER — POTASSIUM CHLORIDE 1.5 G/1.77G
40 POWDER, FOR SOLUTION ORAL AS NEEDED
Status: DISCONTINUED | OUTPATIENT
Start: 2022-10-26 | End: 2022-11-09 | Stop reason: HOSPADM

## 2022-10-26 RX ORDER — VENLAFAXINE HYDROCHLORIDE 75 MG/1
75 CAPSULE, EXTENDED RELEASE ORAL DAILY
Status: DISCONTINUED | OUTPATIENT
Start: 2022-10-26 | End: 2022-11-09 | Stop reason: HOSPADM

## 2022-10-26 RX ORDER — LEVOTHYROXINE SODIUM 0.1 MG/1
100 TABLET ORAL DAILY
Status: DISCONTINUED | OUTPATIENT
Start: 2022-10-26 | End: 2022-11-09 | Stop reason: HOSPADM

## 2022-10-26 RX ORDER — SODIUM CHLORIDE 9 MG/ML
250 INJECTION, SOLUTION INTRAVENOUS AS NEEDED
Status: DISCONTINUED | OUTPATIENT
Start: 2022-10-26 | End: 2022-11-09 | Stop reason: HOSPADM

## 2022-10-26 RX ORDER — TRIAMTERENE AND HYDROCHLOROTHIAZIDE 37.5; 25 MG/1; MG/1
1 TABLET ORAL DAILY
Status: DISCONTINUED | OUTPATIENT
Start: 2022-10-26 | End: 2022-10-26

## 2022-10-26 RX ORDER — AMLODIPINE BESYLATE 5 MG/1
5 TABLET ORAL DAILY
Status: DISCONTINUED | OUTPATIENT
Start: 2022-10-26 | End: 2022-10-26

## 2022-10-26 RX ORDER — FOLIC ACID 1 MG/1
1000 TABLET ORAL DAILY
Status: DISCONTINUED | OUTPATIENT
Start: 2022-10-26 | End: 2022-11-09 | Stop reason: HOSPADM

## 2022-10-26 RX ORDER — ROSUVASTATIN CALCIUM 20 MG/1
20 TABLET, COATED ORAL DAILY
Status: DISCONTINUED | OUTPATIENT
Start: 2022-10-26 | End: 2022-10-26

## 2022-10-26 RX ORDER — ACETAMINOPHEN 325 MG/1
650 TABLET ORAL EVERY 6 HOURS PRN
Status: DISCONTINUED | OUTPATIENT
Start: 2022-10-26 | End: 2022-11-09 | Stop reason: HOSPADM

## 2022-10-26 RX ORDER — ATORVASTATIN CALCIUM 20 MG/1
40 TABLET, FILM COATED ORAL DAILY
Status: DISCONTINUED | OUTPATIENT
Start: 2022-10-26 | End: 2022-11-09 | Stop reason: HOSPADM

## 2022-10-26 RX ORDER — POTASSIUM CHLORIDE 750 MG/1
40 TABLET, FILM COATED, EXTENDED RELEASE ORAL AS NEEDED
Status: DISCONTINUED | OUTPATIENT
Start: 2022-10-26 | End: 2022-11-09 | Stop reason: HOSPADM

## 2022-10-26 RX ORDER — ACETAMINOPHEN 325 MG/1
650 TABLET ORAL ONCE
Status: DISCONTINUED | OUTPATIENT
Start: 2022-10-26 | End: 2022-11-09 | Stop reason: HOSPADM

## 2022-10-26 RX ADMIN — ATORVASTATIN CALCIUM 40 MG: 20 TABLET, FILM COATED ORAL at 11:42

## 2022-10-26 RX ADMIN — ACETAMINOPHEN 650 MG: 325 TABLET, FILM COATED ORAL at 21:47

## 2022-10-26 RX ADMIN — LEVOTHYROXINE SODIUM 100 MCG: 0.1 TABLET ORAL at 11:27

## 2022-10-26 RX ADMIN — AMLODIPINE BESYLATE 5 MG: 5 TABLET ORAL at 11:29

## 2022-10-26 RX ADMIN — Medication 5 MG: at 21:47

## 2022-10-26 RX ADMIN — MIRTAZAPINE 30 MG: 30 TABLET, FILM COATED ORAL at 21:47

## 2022-10-26 RX ADMIN — VENLAFAXINE HYDROCHLORIDE 75 MG: 75 CAPSULE, EXTENDED RELEASE ORAL at 11:27

## 2022-10-26 RX ADMIN — DIPHENHYDRAMINE HYDROCHLORIDE 25 MG: 25 CAPSULE ORAL at 21:47

## 2022-10-26 RX ADMIN — METOPROLOL TARTRATE 25 MG: 25 TABLET ORAL at 21:47

## 2022-10-26 RX ADMIN — FOLIC ACID 1000 MCG: 1 TABLET ORAL at 11:26

## 2022-10-26 RX ADMIN — METOPROLOL TARTRATE 25 MG: 25 TABLET ORAL at 11:29

## 2022-10-26 RX ADMIN — BUPROPION HYDROCHLORIDE 150 MG: 150 TABLET, EXTENDED RELEASE ORAL at 11:28

## 2022-10-26 RX ADMIN — POTASSIUM CHLORIDE 40 MEQ: 750 TABLET, EXTENDED RELEASE ORAL at 15:07

## 2022-10-27 ENCOUNTER — APPOINTMENT (OUTPATIENT)
Dept: CT IMAGING | Facility: HOSPITAL | Age: 82
End: 2022-10-27

## 2022-10-27 DIAGNOSIS — C50.011 MALIGNANT NEOPLASM OF AREOLA OF RIGHT BREAST IN FEMALE, UNSPECIFIED ESTROGEN RECEPTOR STATUS: Primary | ICD-10-CM

## 2022-10-27 LAB
ANION GAP SERPL CALCULATED.3IONS-SCNC: 8.4 MMOL/L (ref 5–15)
BASOPHILS # BLD AUTO: 0.02 10*3/MM3 (ref 0–0.2)
BASOPHILS NFR BLD AUTO: 0.5 % (ref 0–1.5)
BH BB BLOOD EXPIRATION DATE: NORMAL
BH BB BLOOD TYPE BARCODE: 6200
BH BB DISPENSE STATUS: NORMAL
BH BB PRODUCT CODE: NORMAL
BH BB UNIT NUMBER: NORMAL
BUN SERPL-MCNC: 7 MG/DL (ref 8–23)
BUN/CREAT SERPL: 10.9 (ref 7–25)
CALCIUM SPEC-SCNC: 8.9 MG/DL (ref 8.6–10.5)
CHLORIDE SERPL-SCNC: 103 MMOL/L (ref 98–107)
CO2 SERPL-SCNC: 28.6 MMOL/L (ref 22–29)
CREAT SERPL-MCNC: 0.64 MG/DL (ref 0.57–1)
CROSSMATCH INTERPRETATION: NORMAL
DEPRECATED RDW RBC AUTO: 54.6 FL (ref 37–54)
EGFRCR SERPLBLD CKD-EPI 2021: 88.4 ML/MIN/1.73
EOSINOPHIL # BLD AUTO: 0.17 10*3/MM3 (ref 0–0.4)
EOSINOPHIL NFR BLD AUTO: 4.2 % (ref 0.3–6.2)
ERYTHROCYTE [DISTWIDTH] IN BLOOD BY AUTOMATED COUNT: 16.1 % (ref 12.3–15.4)
GLUCOSE SERPL-MCNC: 80 MG/DL (ref 65–99)
HCT VFR BLD AUTO: 29.4 % (ref 34–46.6)
HGB BLD-MCNC: 10 G/DL (ref 12–15.9)
LYMPHOCYTES # BLD AUTO: 1.05 10*3/MM3 (ref 0.7–3.1)
LYMPHOCYTES NFR BLD AUTO: 26.1 % (ref 19.6–45.3)
MCH RBC QN AUTO: 31.9 PG (ref 26.6–33)
MCHC RBC AUTO-ENTMCNC: 34 G/DL (ref 31.5–35.7)
MCV RBC AUTO: 93.9 FL (ref 79–97)
MONOCYTES # BLD AUTO: 0.91 10*3/MM3 (ref 0.1–0.9)
MONOCYTES NFR BLD AUTO: 22.6 % (ref 5–12)
NEUTROPHILS NFR BLD AUTO: 1.81 10*3/MM3 (ref 1.7–7)
NEUTROPHILS NFR BLD AUTO: 44.9 % (ref 42.7–76)
PLATELET # BLD AUTO: 340 10*3/MM3 (ref 140–450)
PMV BLD AUTO: 8.6 FL (ref 6–12)
POTASSIUM SERPL-SCNC: 3.5 MMOL/L (ref 3.5–5.2)
RBC # BLD AUTO: 3.13 10*6/MM3 (ref 3.77–5.28)
SODIUM SERPL-SCNC: 140 MMOL/L (ref 136–145)
UNIT  ABO: NORMAL
UNIT  RH: NORMAL
WBC NRBC COR # BLD: 4.03 10*3/MM3 (ref 3.4–10.8)

## 2022-10-27 PROCEDURE — 88305 TISSUE EXAM BY PATHOLOGIST: CPT | Performed by: INTERNAL MEDICINE

## 2022-10-27 PROCEDURE — 88323 CONSLTJ&REPRT MATRL PREP SLD: CPT

## 2022-10-27 PROCEDURE — 81455 SO/HL 51/>GSAP DNA/DNA&RNA: CPT

## 2022-10-27 PROCEDURE — 88182 CELL MARKER STUDY: CPT

## 2022-10-27 PROCEDURE — 88264 CHROMOSOME ANALYSIS 20-25: CPT

## 2022-10-27 PROCEDURE — 80048 BASIC METABOLIC PNL TOTAL CA: CPT | Performed by: NURSE PRACTITIONER

## 2022-10-27 PROCEDURE — 88341 IMHCHEM/IMCYTCHM EA ADD ANTB: CPT

## 2022-10-27 PROCEDURE — 0 LIDOCAINE 1 % SOLUTION: Performed by: RADIOLOGY

## 2022-10-27 PROCEDURE — 25010000002 MIDAZOLAM PER 1 MG: Performed by: RADIOLOGY

## 2022-10-27 PROCEDURE — 88313 SPECIAL STAINS GROUP 2: CPT | Performed by: INTERNAL MEDICINE

## 2022-10-27 PROCEDURE — 88360 TUMOR IMMUNOHISTOCHEM/MANUAL: CPT

## 2022-10-27 PROCEDURE — 88313 SPECIAL STAINS GROUP 2: CPT

## 2022-10-27 PROCEDURE — 85025 COMPLETE CBC W/AUTO DIFF WBC: CPT | Performed by: INTERNAL MEDICINE

## 2022-10-27 PROCEDURE — 88300 SURGICAL PATH GROSS: CPT | Performed by: INTERNAL MEDICINE

## 2022-10-27 PROCEDURE — 88185 FLOWCYTOMETRY/TC ADD-ON: CPT

## 2022-10-27 PROCEDURE — 99231 SBSQ HOSP IP/OBS SF/LOW 25: CPT | Performed by: INTERNAL MEDICINE

## 2022-10-27 PROCEDURE — 88237 TISSUE CULTURE BONE MARROW: CPT

## 2022-10-27 PROCEDURE — 77012 CT SCAN FOR NEEDLE BIOPSY: CPT

## 2022-10-27 PROCEDURE — 079T3ZX DRAINAGE OF BONE MARROW, PERCUTANEOUS APPROACH, DIAGNOSTIC: ICD-10-PCS | Performed by: RADIOLOGY

## 2022-10-27 PROCEDURE — 88184 FLOWCYTOMETRY/ TC 1 MARKER: CPT

## 2022-10-27 RX ORDER — MIDAZOLAM HYDROCHLORIDE 1 MG/ML
1 INJECTION INTRAMUSCULAR; INTRAVENOUS ONCE
Status: COMPLETED | OUTPATIENT
Start: 2022-10-27 | End: 2022-10-27

## 2022-10-27 RX ORDER — LIDOCAINE HYDROCHLORIDE 10 MG/ML
20 INJECTION, SOLUTION INFILTRATION; PERINEURAL ONCE
Status: COMPLETED | OUTPATIENT
Start: 2022-10-27 | End: 2022-10-27

## 2022-10-27 RX ADMIN — MIRTAZAPINE 30 MG: 30 TABLET, FILM COATED ORAL at 20:53

## 2022-10-27 RX ADMIN — MIDAZOLAM 1 MG: 1 INJECTION INTRAMUSCULAR; INTRAVENOUS at 10:44

## 2022-10-27 RX ADMIN — LIDOCAINE HYDROCHLORIDE 20 ML: 10 INJECTION, SOLUTION INFILTRATION; PERINEURAL at 10:50

## 2022-10-27 RX ADMIN — METOPROLOL TARTRATE 25 MG: 25 TABLET ORAL at 20:53

## 2022-10-27 RX ADMIN — Medication 5 MG: at 20:53

## 2022-10-27 NOTE — PROGRESS NOTES
"Orders entered based on Dr. Dash's staff message:  \"F/u kommor 2-3 wk 2 unit cbc for bone marrow results\"    "

## 2022-10-28 PROCEDURE — 97530 THERAPEUTIC ACTIVITIES: CPT

## 2022-10-28 RX ADMIN — Medication 5 MG: at 20:01

## 2022-10-28 RX ADMIN — LEVOTHYROXINE SODIUM 100 MCG: 0.1 TABLET ORAL at 09:29

## 2022-10-28 RX ADMIN — FOLIC ACID 1000 MCG: 1 TABLET ORAL at 09:28

## 2022-10-28 RX ADMIN — METOPROLOL TARTRATE 25 MG: 25 TABLET ORAL at 09:28

## 2022-10-28 RX ADMIN — VENLAFAXINE HYDROCHLORIDE 75 MG: 75 CAPSULE, EXTENDED RELEASE ORAL at 09:29

## 2022-10-28 RX ADMIN — ATORVASTATIN CALCIUM 40 MG: 20 TABLET, FILM COATED ORAL at 09:28

## 2022-10-28 RX ADMIN — BUPROPION HYDROCHLORIDE 150 MG: 150 TABLET, EXTENDED RELEASE ORAL at 09:28

## 2022-10-28 RX ADMIN — MIRTAZAPINE 30 MG: 30 TABLET, FILM COATED ORAL at 20:01

## 2022-10-29 PROBLEM — I95.1 ORTHOSTATIC HYPOTENSION: Status: ACTIVE | Noted: 2022-10-29

## 2022-10-29 PROBLEM — E87.1 HYPONATREMIA: Status: RESOLVED | Noted: 2022-10-25 | Resolved: 2022-10-29

## 2022-10-29 PROCEDURE — 97530 THERAPEUTIC ACTIVITIES: CPT

## 2022-10-29 RX ORDER — FLUDROCORTISONE ACETATE 0.1 MG/1
50 TABLET ORAL DAILY
Status: DISCONTINUED | OUTPATIENT
Start: 2022-10-29 | End: 2022-10-30

## 2022-10-29 RX ADMIN — ATORVASTATIN CALCIUM 40 MG: 20 TABLET, FILM COATED ORAL at 08:19

## 2022-10-29 RX ADMIN — Medication 5 MG: at 20:24

## 2022-10-29 RX ADMIN — FOLIC ACID 1000 MCG: 1 TABLET ORAL at 08:19

## 2022-10-29 RX ADMIN — VENLAFAXINE HYDROCHLORIDE 75 MG: 75 CAPSULE, EXTENDED RELEASE ORAL at 08:19

## 2022-10-29 RX ADMIN — MIRTAZAPINE 30 MG: 30 TABLET, FILM COATED ORAL at 20:24

## 2022-10-29 RX ADMIN — LEVOTHYROXINE SODIUM 100 MCG: 0.1 TABLET ORAL at 08:19

## 2022-10-29 RX ADMIN — BUPROPION HYDROCHLORIDE 150 MG: 150 TABLET, EXTENDED RELEASE ORAL at 08:19

## 2022-10-29 RX ADMIN — FLUDROCORTISONE ACETATE 50 MCG: 0.1 TABLET ORAL at 16:11

## 2022-10-30 LAB
ANION GAP SERPL CALCULATED.3IONS-SCNC: 7.6 MMOL/L (ref 5–15)
BASOPHILS # BLD AUTO: 0.02 10*3/MM3 (ref 0–0.2)
BASOPHILS NFR BLD AUTO: 0.5 % (ref 0–1.5)
BUN SERPL-MCNC: 10 MG/DL (ref 8–23)
BUN/CREAT SERPL: 13.7 (ref 7–25)
CALCIUM SPEC-SCNC: 8.9 MG/DL (ref 8.6–10.5)
CHLORIDE SERPL-SCNC: 101 MMOL/L (ref 98–107)
CO2 SERPL-SCNC: 27.4 MMOL/L (ref 22–29)
CREAT SERPL-MCNC: 0.73 MG/DL (ref 0.57–1)
DEPRECATED RDW RBC AUTO: 56.9 FL (ref 37–54)
EGFRCR SERPLBLD CKD-EPI 2021: 82.2 ML/MIN/1.73
EOSINOPHIL # BLD AUTO: 0.14 10*3/MM3 (ref 0–0.4)
EOSINOPHIL NFR BLD AUTO: 3.4 % (ref 0.3–6.2)
ERYTHROCYTE [DISTWIDTH] IN BLOOD BY AUTOMATED COUNT: 16.1 % (ref 12.3–15.4)
GLUCOSE SERPL-MCNC: 103 MG/DL (ref 65–99)
HCT VFR BLD AUTO: 31.3 % (ref 34–46.6)
HGB BLD-MCNC: 10.1 G/DL (ref 12–15.9)
IMM GRANULOCYTES # BLD AUTO: 0.02 10*3/MM3 (ref 0–0.05)
IMM GRANULOCYTES NFR BLD AUTO: 0.5 % (ref 0–0.5)
LYMPHOCYTES # BLD AUTO: 1.03 10*3/MM3 (ref 0.7–3.1)
LYMPHOCYTES NFR BLD AUTO: 24.8 % (ref 19.6–45.3)
MCH RBC QN AUTO: 31.3 PG (ref 26.6–33)
MCHC RBC AUTO-ENTMCNC: 32.3 G/DL (ref 31.5–35.7)
MCV RBC AUTO: 96.9 FL (ref 79–97)
MONOCYTES # BLD AUTO: 0.57 10*3/MM3 (ref 0.1–0.9)
MONOCYTES NFR BLD AUTO: 13.7 % (ref 5–12)
NEUTROPHILS NFR BLD AUTO: 2.37 10*3/MM3 (ref 1.7–7)
NEUTROPHILS NFR BLD AUTO: 57.1 % (ref 42.7–76)
NRBC BLD AUTO-RTO: 0 /100 WBC (ref 0–0.2)
PLATELET # BLD AUTO: 372 10*3/MM3 (ref 140–450)
PMV BLD AUTO: 8.7 FL (ref 6–12)
POTASSIUM SERPL-SCNC: 3.2 MMOL/L (ref 3.5–5.2)
RBC # BLD AUTO: 3.23 10*6/MM3 (ref 3.77–5.28)
SODIUM SERPL-SCNC: 136 MMOL/L (ref 136–145)
WBC NRBC COR # BLD: 4.15 10*3/MM3 (ref 3.4–10.8)

## 2022-10-30 PROCEDURE — 85025 COMPLETE CBC W/AUTO DIFF WBC: CPT | Performed by: INTERNAL MEDICINE

## 2022-10-30 PROCEDURE — 80048 BASIC METABOLIC PNL TOTAL CA: CPT | Performed by: INTERNAL MEDICINE

## 2022-10-30 RX ORDER — FLUDROCORTISONE ACETATE 0.1 MG/1
100 TABLET ORAL DAILY
Status: DISCONTINUED | OUTPATIENT
Start: 2022-10-31 | End: 2022-11-09 | Stop reason: HOSPADM

## 2022-10-30 RX ORDER — FLUDROCORTISONE ACETATE 0.1 MG/1
50 TABLET ORAL ONCE
Status: COMPLETED | OUTPATIENT
Start: 2022-10-30 | End: 2022-10-30

## 2022-10-30 RX ADMIN — FLUDROCORTISONE ACETATE 50 MCG: 0.1 TABLET ORAL at 08:27

## 2022-10-30 RX ADMIN — POTASSIUM CHLORIDE 40 MEQ: 750 TABLET, EXTENDED RELEASE ORAL at 17:00

## 2022-10-30 RX ADMIN — ATORVASTATIN CALCIUM 40 MG: 20 TABLET, FILM COATED ORAL at 08:32

## 2022-10-30 RX ADMIN — FLUDROCORTISONE ACETATE 50 MCG: 0.1 TABLET ORAL at 15:50

## 2022-10-30 RX ADMIN — LEVOTHYROXINE SODIUM 100 MCG: 0.1 TABLET ORAL at 08:27

## 2022-10-30 RX ADMIN — FOLIC ACID 1000 MCG: 1 TABLET ORAL at 08:27

## 2022-10-30 RX ADMIN — BUPROPION HYDROCHLORIDE 150 MG: 150 TABLET, EXTENDED RELEASE ORAL at 08:27

## 2022-10-30 RX ADMIN — Medication 5 MG: at 20:28

## 2022-10-30 RX ADMIN — MIRTAZAPINE 30 MG: 30 TABLET, FILM COATED ORAL at 20:28

## 2022-10-30 RX ADMIN — VENLAFAXINE HYDROCHLORIDE 75 MG: 75 CAPSULE, EXTENDED RELEASE ORAL at 08:27

## 2022-10-30 RX ADMIN — POTASSIUM CHLORIDE 40 MEQ: 750 TABLET, EXTENDED RELEASE ORAL at 20:28

## 2022-10-31 ENCOUNTER — APPOINTMENT (OUTPATIENT)
Dept: LAB | Facility: HOSPITAL | Age: 82
End: 2022-10-31

## 2022-10-31 PROBLEM — D64.9 SYMPTOMATIC ANEMIA: Status: ACTIVE | Noted: 2022-10-06

## 2022-10-31 PROBLEM — D70.8 OTHER NEUTROPENIA (HCC): Status: ACTIVE | Noted: 2022-10-31

## 2022-10-31 LAB
ANION GAP SERPL CALCULATED.3IONS-SCNC: 8.6 MMOL/L (ref 5–15)
BASOPHILS # BLD AUTO: 0.03 10*3/MM3 (ref 0–0.2)
BASOPHILS NFR BLD AUTO: 0.8 % (ref 0–1.5)
BUN SERPL-MCNC: 8 MG/DL (ref 8–23)
BUN/CREAT SERPL: 12.1 (ref 7–25)
CALCIUM SPEC-SCNC: 8.6 MG/DL (ref 8.6–10.5)
CHLORIDE SERPL-SCNC: 103 MMOL/L (ref 98–107)
CO2 SERPL-SCNC: 28.4 MMOL/L (ref 22–29)
CREAT SERPL-MCNC: 0.66 MG/DL (ref 0.57–1)
DEPRECATED RDW RBC AUTO: 55.3 FL (ref 37–54)
EGFRCR SERPLBLD CKD-EPI 2021: 87.7 ML/MIN/1.73
EOSINOPHIL # BLD AUTO: 0.16 10*3/MM3 (ref 0–0.4)
EOSINOPHIL NFR BLD AUTO: 4.2 % (ref 0.3–6.2)
ERYTHROCYTE [DISTWIDTH] IN BLOOD BY AUTOMATED COUNT: 15.9 % (ref 12.3–15.4)
GLUCOSE SERPL-MCNC: 88 MG/DL (ref 65–99)
HCT VFR BLD AUTO: 30 % (ref 34–46.6)
HGB BLD-MCNC: 9.9 G/DL (ref 12–15.9)
IMM GRANULOCYTES # BLD AUTO: 0.03 10*3/MM3 (ref 0–0.05)
IMM GRANULOCYTES NFR BLD AUTO: 0.8 % (ref 0–0.5)
LYMPHOCYTES # BLD AUTO: 1.18 10*3/MM3 (ref 0.7–3.1)
LYMPHOCYTES NFR BLD AUTO: 31.1 % (ref 19.6–45.3)
MCH RBC QN AUTO: 31.6 PG (ref 26.6–33)
MCHC RBC AUTO-ENTMCNC: 33 G/DL (ref 31.5–35.7)
MCV RBC AUTO: 95.8 FL (ref 79–97)
MONOCYTES # BLD AUTO: 0.63 10*3/MM3 (ref 0.1–0.9)
MONOCYTES NFR BLD AUTO: 16.6 % (ref 5–12)
NEUTROPHILS NFR BLD AUTO: 1.76 10*3/MM3 (ref 1.7–7)
NEUTROPHILS NFR BLD AUTO: 46.5 % (ref 42.7–76)
NRBC BLD AUTO-RTO: 0 /100 WBC (ref 0–0.2)
PLATELET # BLD AUTO: 378 10*3/MM3 (ref 140–450)
PMV BLD AUTO: 8.9 FL (ref 6–12)
POTASSIUM SERPL-SCNC: 3.5 MMOL/L (ref 3.5–5.2)
RBC # BLD AUTO: 3.13 10*6/MM3 (ref 3.77–5.28)
SODIUM SERPL-SCNC: 140 MMOL/L (ref 136–145)
WBC NRBC COR # BLD: 3.79 10*3/MM3 (ref 3.4–10.8)

## 2022-10-31 PROCEDURE — 85025 COMPLETE CBC W/AUTO DIFF WBC: CPT | Performed by: INTERNAL MEDICINE

## 2022-10-31 PROCEDURE — 99222 1ST HOSP IP/OBS MODERATE 55: CPT | Performed by: INTERNAL MEDICINE

## 2022-10-31 PROCEDURE — 97530 THERAPEUTIC ACTIVITIES: CPT

## 2022-10-31 PROCEDURE — 80048 BASIC METABOLIC PNL TOTAL CA: CPT | Performed by: INTERNAL MEDICINE

## 2022-10-31 RX ORDER — MIDODRINE HYDROCHLORIDE 2.5 MG/1
2.5 TABLET ORAL
Status: DISCONTINUED | OUTPATIENT
Start: 2022-10-31 | End: 2022-11-03

## 2022-10-31 RX ADMIN — BUPROPION HYDROCHLORIDE 150 MG: 150 TABLET, EXTENDED RELEASE ORAL at 09:00

## 2022-10-31 RX ADMIN — POTASSIUM CHLORIDE 40 MEQ: 750 TABLET, EXTENDED RELEASE ORAL at 09:05

## 2022-10-31 RX ADMIN — ATORVASTATIN CALCIUM 40 MG: 20 TABLET, FILM COATED ORAL at 08:59

## 2022-10-31 RX ADMIN — LEVOTHYROXINE SODIUM 100 MCG: 0.1 TABLET ORAL at 09:00

## 2022-10-31 RX ADMIN — MIRTAZAPINE 30 MG: 30 TABLET, FILM COATED ORAL at 21:01

## 2022-10-31 RX ADMIN — Medication 5 MG: at 22:56

## 2022-10-31 RX ADMIN — POTASSIUM CHLORIDE 40 MEQ: 750 TABLET, EXTENDED RELEASE ORAL at 13:01

## 2022-10-31 RX ADMIN — FLUDROCORTISONE ACETATE 100 MCG: 0.1 TABLET ORAL at 09:00

## 2022-10-31 RX ADMIN — FOLIC ACID 1000 MCG: 1 TABLET ORAL at 09:00

## 2022-10-31 RX ADMIN — VENLAFAXINE HYDROCHLORIDE 75 MG: 75 CAPSULE, EXTENDED RELEASE ORAL at 08:59

## 2022-11-01 LAB
ANION GAP SERPL CALCULATED.3IONS-SCNC: 9 MMOL/L (ref 5–15)
BASOPHILS # BLD AUTO: 0.03 10*3/MM3 (ref 0–0.2)
BASOPHILS NFR BLD AUTO: 0.9 % (ref 0–1.5)
BUN SERPL-MCNC: 11 MG/DL (ref 8–23)
BUN/CREAT SERPL: 21.2 (ref 7–25)
CALCIUM SPEC-SCNC: 8.8 MG/DL (ref 8.6–10.5)
CHLORIDE SERPL-SCNC: 104 MMOL/L (ref 98–107)
CO2 SERPL-SCNC: 26 MMOL/L (ref 22–29)
CREAT SERPL-MCNC: 0.52 MG/DL (ref 0.57–1)
DEPRECATED RDW RBC AUTO: 55.1 FL (ref 37–54)
EGFRCR SERPLBLD CKD-EPI 2021: 92.9 ML/MIN/1.73
EOSINOPHIL # BLD AUTO: 0.18 10*3/MM3 (ref 0–0.4)
EOSINOPHIL NFR BLD AUTO: 5.2 % (ref 0.3–6.2)
ERYTHROCYTE [DISTWIDTH] IN BLOOD BY AUTOMATED COUNT: 15.8 % (ref 12.3–15.4)
GLUCOSE SERPL-MCNC: 88 MG/DL (ref 65–99)
HCT VFR BLD AUTO: 30.2 % (ref 34–46.6)
HGB BLD-MCNC: 9.8 G/DL (ref 12–15.9)
IMM GRANULOCYTES # BLD AUTO: 0.02 10*3/MM3 (ref 0–0.05)
IMM GRANULOCYTES NFR BLD AUTO: 0.6 % (ref 0–0.5)
LYMPHOCYTES # BLD AUTO: 1.17 10*3/MM3 (ref 0.7–3.1)
LYMPHOCYTES NFR BLD AUTO: 34 % (ref 19.6–45.3)
MCH RBC QN AUTO: 31.3 PG (ref 26.6–33)
MCHC RBC AUTO-ENTMCNC: 32.5 G/DL (ref 31.5–35.7)
MCV RBC AUTO: 96.5 FL (ref 79–97)
MONOCYTES # BLD AUTO: 0.52 10*3/MM3 (ref 0.1–0.9)
MONOCYTES NFR BLD AUTO: 15.1 % (ref 5–12)
NEUTROPHILS NFR BLD AUTO: 1.52 10*3/MM3 (ref 1.7–7)
NEUTROPHILS NFR BLD AUTO: 44.2 % (ref 42.7–76)
NRBC BLD AUTO-RTO: 0 /100 WBC (ref 0–0.2)
PLATELET # BLD AUTO: 326 10*3/MM3 (ref 140–450)
PMV BLD AUTO: 8.7 FL (ref 6–12)
POTASSIUM SERPL-SCNC: 4.2 MMOL/L (ref 3.5–5.2)
RBC # BLD AUTO: 3.13 10*6/MM3 (ref 3.77–5.28)
SODIUM SERPL-SCNC: 139 MMOL/L (ref 136–145)
WBC NRBC COR # BLD: 3.44 10*3/MM3 (ref 3.4–10.8)

## 2022-11-01 PROCEDURE — 97110 THERAPEUTIC EXERCISES: CPT

## 2022-11-01 PROCEDURE — 80048 BASIC METABOLIC PNL TOTAL CA: CPT | Performed by: INTERNAL MEDICINE

## 2022-11-01 PROCEDURE — 97116 GAIT TRAINING THERAPY: CPT

## 2022-11-01 PROCEDURE — 85025 COMPLETE CBC W/AUTO DIFF WBC: CPT | Performed by: INTERNAL MEDICINE

## 2022-11-01 RX ADMIN — BUPROPION HYDROCHLORIDE 150 MG: 150 TABLET, EXTENDED RELEASE ORAL at 08:07

## 2022-11-01 RX ADMIN — MIDODRINE HYDROCHLORIDE 2.5 MG: 2.5 TABLET ORAL at 08:07

## 2022-11-01 RX ADMIN — MIRTAZAPINE 30 MG: 30 TABLET, FILM COATED ORAL at 21:01

## 2022-11-01 RX ADMIN — FOLIC ACID 1000 MCG: 1 TABLET ORAL at 08:07

## 2022-11-01 RX ADMIN — LEVOTHYROXINE SODIUM 100 MCG: 0.1 TABLET ORAL at 08:07

## 2022-11-01 RX ADMIN — Medication 5 MG: at 22:24

## 2022-11-01 RX ADMIN — MIDODRINE HYDROCHLORIDE 2.5 MG: 2.5 TABLET ORAL at 11:37

## 2022-11-01 RX ADMIN — VENLAFAXINE HYDROCHLORIDE 75 MG: 75 CAPSULE, EXTENDED RELEASE ORAL at 08:07

## 2022-11-01 RX ADMIN — ATORVASTATIN CALCIUM 40 MG: 20 TABLET, FILM COATED ORAL at 08:07

## 2022-11-01 RX ADMIN — FLUDROCORTISONE ACETATE 100 MCG: 0.1 TABLET ORAL at 08:07

## 2022-11-01 NOTE — PLAN OF CARE
Goal Outcome Evaluation:  Plan of Care Reviewed With: patient        Progress: improving  Outcome Evaluation: Pt seen for PT this am. SHe is doing better today and now has compression stockings for BLE. Pt was able to sit EOB and then stand w no complaints of dizziness or feeling light headed. SHe did ambulate 10 ft x 2 min A/HHA. She does have some BLE weakness due to multiple days of decreased activity. Pt also tolerated a few BLE exercises at EOB. Encouraged further ambulation with walker, but pt preferred to lay back down. Pt possibly DC soon. May recommend SNU for short stay if pt does not have assistance at home. Discussed w RN. Will continue to progress as tolerated.

## 2022-11-01 NOTE — CASE MANAGEMENT/SOCIAL WORK
Continued Stay Note  [facility]     Patient Name: Rekha Campos  MRN: [id number]  Today's Date: 11/1/2022    Admit Date: 10/25/2022    Plan: SNF referrals pending pt will need pre-cert   Discharge Plan     Row Name 11/01/22 1543       Plan    Plan SNF referrals pending pt will need pre-cert    Patient/Family in Agreement with Plan yes    Provided Post Acute Provider List? N/A    N/A Provider List Comment they know their snf choices    Plan Comments Reviewed clinicals and PT notes. Spoke with pt at bedside, introduced self and explained CCP role. Reviewed PT notes and discussed dc planning, pt is hoping to go home with . She states her spouse recently went home from rehab with [facility]. CCP explained HH vs SNF services and pre-cert process. [facility] granted permission to speak with her daughters Brianna and Eli [phone]. [facility] called Eli via outbound call, introduced self and explained CCP role. She confirms her sister Brianna and pts spouse and her all request rehab at [place]. She requests referrals to [facility] and [facility]. CCP discussed pre-cert process and she verbalized understanding. She states that as a family they will also speak with pt in regards to going to short term rehab. Spoke with (Brianna [phone] ) via outbound call and she verbalizes need for rehab Referrals made to [facility] and [facility]. Partial Packet in ccp office.               Discharge Codes    No documentation.               Expected Discharge Date and Time     Expected Discharge Date Expected Discharge Time    Nov 1, 2022             Dea Zavala RN

## 2022-11-01 NOTE — PROGRESS NOTES
Name: Rekha Campos ADMIT: 10/25/2022   : 1940  PCP: Roseanne Sanchez MD    MRN: [id number] LOS: 5 days   AGE/SEX: 82 y.o. female  ROOM: [facility]     Subjective   Subjective   CC: near syncope  No acute events. Patient denies new complaints. Orthostatic symptoms have improved. Taking PO. No CP/dyspnea/f/cn/v/d.  Objective   Objective   Vital Signs  Temp:  [98.3 °F (36.8 °C)-98.8 °F (37.1 °C)] 98.8 °F (37.1 °C)  Heart Rate:  [66-93] 67  Resp:  [18] 18  BP: ()/(61-97) 163/97  SpO2:  [93 %-94 %] 94 %  on   ;   Device (Oxygen Therapy): room air  Body mass index is 23.07 kg/m².  Physical Exam  Vitals and nursing note reviewed.   Constitutional:       General: She is not in acute distress.     Appearance: She is not toxic-appearing or diaphoretic.   HENT:      Head: Normocephalic and atraumatic.      Nose: Nose normal.      Mouth/Throat:      Mouth: Mucous membranes are moist.      Pharynx: Oropharynx is clear.   Eyes:      Conjunctiva/sclera: Conjunctivae normal.      Pupils: Pupils are equal, round, and reactive to light.   Cardiovascular:      Rate and Rhythm: Normal rate and regular rhythm.      Pulses: Normal pulses.   Pulmonary:      Effort: Pulmonary effort is normal.      Breath sounds: Normal breath sounds.   Abdominal:      General: Bowel sounds are normal.      Palpations: Abdomen is soft.      Tenderness: There is no abdominal tenderness.   Musculoskeletal:         General: No swelling or tenderness.      Cervical back: Normal range of motion and neck supple.   Skin:     General: Skin is warm and dry.      Capillary Refill: Capillary refill takes less than 2 seconds.   Neurological:      General: No focal deficit present.      Mental Status: She is alert and oriented to person, place, and time.   Psychiatric:         Mood and Affect: Mood normal.         Behavior: Behavior normal.       Results Review     I reviewed the patient's new clinical results.  I reviewed the patient's  telemetry  Results from last 7 days   Lab Units 11/01/22  0547 10/31/22  0524 10/30/22  1601 10/27/22  0502   WBC 10*3/mm3 3.44 3.79 4.15 4.03   HEMOGLOBIN g/dL 9.8* 9.9* 10.1* 10.0*   PLATELETS 10*3/mm3 326 378 372 340     Results from last 7 days   Lab Units 11/01/22  0547 10/31/22  0524 10/30/22  1601 10/27/22  0501   SODIUM mmol/L 139 140 136 140   POTASSIUM mmol/L 4.2 3.5 3.2* 3.5   CHLORIDE mmol/L 104 103 101 103   CO2 mmol/L 26.0 28.4 27.4 28.6   BUN mg/dL 11 8 10 7*   CREATININE mg/dL 0.52* 0.66 0.73 0.64   GLUCOSE mg/dL 88 88 103* 80   EGFR mL/min/1.73 92.9 87.7 82.2 88.4         Results from last 7 days   Lab Units 11/01/22  0547 10/31/22  0524 10/30/22  1601 10/27/22  0501   CALCIUM mg/dL 8.8 8.6 8.9 8.9       No results found for: HGBA1C, POCGLU    No radiology results for the last day  Scheduled Medications  acetaminophen, 650 mg, Oral, Once  atorvastatin, 40 mg, Oral, Daily  buPROPion XL, 150 mg, Oral, Daily  diphenhydrAMINE, 25 mg, Oral, Once  fludrocortisone, 100 mcg, Oral, Daily  folic acid, 1,000 mcg, Oral, Daily  levothyroxine, 100 mcg, Oral, Daily  midodrine, 2.5 mg, Oral, TID AC  mirtazapine, 30 mg, Oral, Nightly  venlafaxine XR, 75 mg, Oral, Daily    Infusions   Diet  Diet Regular       Assessment/Plan     Active Hospital Problems    Diagnosis  POA   • **Near syncope [R55]  Yes   • Other neutropenia (HCC) [D70.8]  Yes   • Orthostatic hypotension [I95.1]  Yes   • Hypothyroidism (acquired) [E03.9]  Yes   • HTN (hypertension) [I10]  Yes   • Hypokalemia [E87.6]  Yes   • Symptomatic anemia [D64.9]  Yes      Resolved Hospital Problems    Diagnosis Date Resolved POA   • Hyponatremia [E87.1] 10/29/2022 Yes   Presyncope/Orthostatic Hypotension  - she has received IVF and PRBCs without much change  - echocardiogram is okay  - continue compression stockings and abdominal binder  - continue fludrocortisone 100mcg daily and midodrine  - appreciate neurology and cardiology evaluations- they have signed  off    Macrocytic Anemia/neutropenia  - no nutritional deficiencies, negative peripheral blood flow cytometry  - s/p bone marrow biopsy 10/27/22  - appreciate hematology/oncology recs, follow up with Dr. Knox in 2-3 weeks    HTN  - home meds stopped due to orthostatic hypotension    Hypothyroidism  - continue levothyroxine    Depression  - controlled  - continue current regimen    HLD  - continue lipitor    SCDs for DVT prophylaxis.  Full code.  Discussed with patient, nursing staff and CCP.  Anticipate discharge to SNU facility once precert has been obtained.      Flaquito Patiño MD  [facility]  11/01/22  17:52 EDT

## 2022-11-01 NOTE — THERAPY TREATMENT NOTE
Patient Name: Rekha Campos  : 1940    MRN: [id number]                              Today's Date: 2022       Admit Date: 10/25/2022    Visit Dx:     ICD-10-CM ICD-9-CM   1. Near syncope  R55 780.2   2. Anemia, unspecified type  D64.9 285.9   3. Generalized weakness  R53.1 780.79   4. Precipitous drop in hematocrit   R71.0 790.01   5. Pancytopenia (HCC)  D61.818 284.19   6. Malignant neoplasm of areola of right breast in female, unspecified estrogen receptor status (HCC)  C50.011 174.0   7. Orthostatic hypotension  I95.1 458.0     Patient Active Problem List   Diagnosis   • Malignant neoplasm of areola of right breast in female (HCC)   • Osteoporosis   • Osteopenia of multiple sites   • Symptomatic anemia   • Near syncope   • Hypothyroidism (acquired)   • HTN (hypertension)   • Hypokalemia   • Orthostatic hypotension   • Other neutropenia (HCC)     Past Medical History:   Diagnosis Date   • Anxiety    • Cancer (HCC)     Stage I, right breast cancer   • Depression    • Disease of thyroid gland     hypothyroidusm   • Erythrocytosis    • Hypercholesterolemia    • Hypertension    • Osteoporosis     osteopenia mild     Past Surgical History:   Procedure Laterality Date   • BREAST LUMPECTOMY Right    • COLONOSCOPY     • EYE SURGERY  2004    corneal transplant   • HYSTERECTOMY     • SHOULDER SURGERY        General Information     Row Name 22 1211          Physical Therapy Time and Intention    Document Type therapy note (daily note)  -     Mode of Treatment physical therapy  -     Row Name 22 1211          General Information    Existing Precautions/Restrictions orthostatic hypotension  -EJ           User Key  (r) = Recorded By, (t) = Taken By, (c) = Cosigned By    Initials Name Provider Type    EJ Mary Jane Stack, PT Physical Therapist               Mobility     Row Name 22 1211          Bed Mobility    Supine-Sit [place] (Bed Mobility) standby assist  -     Sit-Supine  [place] (Bed Mobility) standby assist  -EJ     Assistive Device (Bed Mobility) head of bed elevated;bed rails  -EJ     Row Name 11/01/22 1211          Sit-Stand Transfer    Sit-Stand [place] (Transfers) contact guard;verbal cues  -EJ     Assistive Device (Sit-Stand Transfers) --  HHA  -EJ     Row Name 11/01/22 1211          Gait/Stairs (Locomotion)    [place] Level (Gait) verbal cues;minimum assist (75% patient effort)  -EJ     Assistive Device (Gait) --  HHA  -EJ     Distance in Feet (Gait) 10 ft x 2  -EJ     Deviations/Abnormal Patterns (Gait) ro decreased;stride length decreased  -EJ     Bilateral Gait Deviations forward flexed posture  -EJ     Comment, (Gait/Stairs) unsteady, weakness noted in BLE, ambulated 10 ft and then pt required seated rest and ambulated another 10 ft. no complaints of dizziness while up today, but mainly limited by BLE weakness due to multiple days of decreased activity  -EJ           User Key  (r) = Recorded By, (t) = Taken By, (c) = Cosigned By    Initials Name Provider Type    Mary Jane Calderon, PT Physical Therapist               Obj/Interventions     Row Name 11/01/22 1213          Motor Skills    Therapeutic Exercise --  seated BLE ther ex x 10 reps  -EJ           User Key  (r) = Recorded By, (t) = Taken By, (c) = Cosigned By    Initials Name Provider Type    Mary Jane Calderon, PT Physical Therapist               Goals/Plan    No documentation.                Clinical Impression     Row Name 11/01/22 1213          Pain    Pretreatment Pain Rating 0/10 - no pain  -EJ     Posttreatment Pain Rating 0/10 - no pain  -EJ     Row Name 11/01/22 1213          Plan of Care Review    Plan of Care Reviewed With patient  -EJ     Progress improving  -EJ     Outcome Evaluation Pt seen for PT this am. SHe is doing better today and now has compression stockings for BLE. Pt was able to sit EOB and then stand w no complaints of dizziness or feeling light headed. SHe did  ambulate 10 ft x 2 min A/HHA. She does have some BLE weakness due to multiple days of decreased activity. Pt also tolerated a few BLE exercises at EOB. Encouraged further ambulation with walker, but pt preferred to lay back down. Pt possibly DC soon. May recommend SNU for short stay if pt does not have assistance at home. Discussed w RN. Will continue to progress as tolerated.  -EJ     Row Name 11/01/22 1213          Positioning and Restraints    Pre-Treatment Position in bed  -EJ     Post Treatment Position bed  -EJ     In Bed notified nsg;supine;call light within reach;encouraged to call for assist;exit alarm on  -EJ           User Key  (r) = Recorded By, (t) = Taken By, (c) = Cosigned By    Initials Name Provider Type    Mary Jane Calderon, PT Physical Therapist               Outcome Measures     Row Name 11/01/22 1219 11/01/22 0830       How much help from another person do you currently need...    Turning from your back to your side while in flat bed without using bedrails? 3  -EJ 3  -KA    Moving from lying on back to sitting on the side of a flat bed without bedrails? 3  -EJ 3  -KA    Moving to and from a bed to a chair (including a wheelchair)? 3  -EJ 3  -KA    Standing up from a chair using your arms (e.g., wheelchair, bedside chair)? 3  -EJ 3  -KA    Climbing 3-5 steps with a railing? 2  -EJ 3  -KA    To walk in hospital room? 3  -EJ 3  -KA    AM-PAC 6 Clicks Score (PT) 17  -EJ 18  -KA    Highest level of mobility 5 --> Static standing  -EJ 6 --> Walked 10 steps or more  -KA          User Key  (r) = Recorded By, (t) = Taken By, (c) = Cosigned By    Initials Name Provider Type    Mary Jane Calderon, PT Physical Therapist    Tri Robb RN Registered Nurse                             Physical Therapy Education     Title: PT OT SLP Therapies (Done)     Topic: Physical Therapy (Done)     Point: Mobility training (Done)     Learning Progress Summary           Patient Acceptance, E,TB,D, VU by KATERINA  at 10/31/2022 1159    Acceptance, E, VU,NR by  at 10/29/2022 1447    Acceptance, E,TB,D, VU,NR by  at 10/28/2022 1319    Acceptance, E, NR by  at 10/26/2022 1149                   Point: Home exercise program (Done)     Learning Progress Summary           Patient Acceptance, E,TB,D, VU,NR by  at 10/28/2022 1319    Acceptance, E, NR by  at 10/26/2022 1149                   Point: Body mechanics (Done)     Learning Progress Summary           Patient Acceptance, E,TB,D, VU,NR by  at 10/28/2022 1319    Acceptance, E, NR by  at 10/26/2022 1149                   Point: Precautions (Done)     Learning Progress Summary           Patient Acceptance, E, VU,NR by  at 10/29/2022 1447    Acceptance, E,TB,D, VU,NR by  at 10/28/2022 1319    Acceptance, E, NR by  at 10/26/2022 1149                               User Key     Initials Effective Dates Name Provider Type Discipline     06/16/21 -  Mony Au, PT Physical Therapist PT     06/16/21 -  Esther Thacker, PT Physical Therapist PT     06/16/21 -  Mary Jane Stack, PT Physical Therapist PT     06/16/21 -  Jacklyn De La Rosa, PT Physical Therapist PT              PT Recommendation and Plan     Plan of Care Reviewed With: patient  Progress: improving  Outcome Evaluation: Pt seen for PT this am. SHe is doing better today and now has compression stockings for BLE. Pt was able to sit EOB and then stand w no complaints of dizziness or feeling light headed. SHe did ambulate 10 ft x 2 min A/HHA. She does have some BLE weakness due to multiple days of decreased activity. Pt also tolerated a few BLE exercises at EOB. Encouraged further ambulation with walker, but pt preferred to lay back down. Pt possibly DC soon. May recommend SNU for short stay if pt does not have assistance at home. Discussed w RN. Will continue to progress as tolerated.     Time Calculation:    PT Charges     Row Name 11/01/22 1220             Time Calculation    Start Time 1028   -EJ      Stop Time 1050  -EJ      Time Calculation (min) 25 min  -EJ      PT Received On 11/01/22  -EJ      PT - Next Appointment 11/02/22  -EJ         Timed Charges    65223 - PT Therapeutic Exercise Minutes 5  -EJ      62598 - Gait Training Minutes  15  -EJ      88624 - PT Therapeutic Activity Minutes 5  -EJ         Total Minutes    Timed Charges Total Minutes 25  -EJ       Total Minutes 25  -EJ            User Key  (r) = Recorded By, (t) = Taken By, (c) = Cosigned By    Initials Name Provider Type    EJ Mary Jane Stack, PT Physical Therapist              Therapy Charges for Today     Code Description Service Date Service Provider Modifiers Qty    32500093655 HC PT THERAPEUTIC ACT EA 15 MIN 10/31/2022 Mary Jane Stack, PT GP 1    65377957215 HC PT THER PROC EA 15 MIN 11/1/2022 Mary Jane Stack, PT GP 1    27389145162 HC GAIT TRAINING EA 15 MIN 11/1/2022 Mary Jane Stack, PT GP 1          PT G-Codes  Outcome Measure Options: AM-PAC 6 Clicks Basic Mobility (PT)  AM-PAC 6 Clicks Score (PT): 17    Mary Jane Stack, PT  11/1/2022

## 2022-11-01 NOTE — PLAN OF CARE
Goal Outcome Evaluation:               Pt had uneventful night,pt appeared to rest well,pt had no complaints of pain pt inquired about blood pressure medication and its usage, this numrse discussed the effects of midodrine with pt,

## 2022-11-02 LAB
ANION GAP SERPL CALCULATED.3IONS-SCNC: 10.6 MMOL/L (ref 5–15)
BASOPHILS # BLD AUTO: 0.03 10*3/MM3 (ref 0–0.2)
BASOPHILS NFR BLD AUTO: 0.9 % (ref 0–1.5)
BUN SERPL-MCNC: 10 MG/DL (ref 8–23)
BUN/CREAT SERPL: 19.2 (ref 7–25)
CALCIUM SPEC-SCNC: 8.7 MG/DL (ref 8.6–10.5)
CHLORIDE SERPL-SCNC: 101 MMOL/L (ref 98–107)
CO2 SERPL-SCNC: 28.4 MMOL/L (ref 22–29)
CREAT SERPL-MCNC: 0.52 MG/DL (ref 0.57–1)
DEPRECATED RDW RBC AUTO: 55.7 FL (ref 37–54)
EGFRCR SERPLBLD CKD-EPI 2021: 92.9 ML/MIN/1.73
EOSINOPHIL # BLD AUTO: 0.17 10*3/MM3 (ref 0–0.4)
EOSINOPHIL NFR BLD AUTO: 4.8 % (ref 0.3–6.2)
ERYTHROCYTE [DISTWIDTH] IN BLOOD BY AUTOMATED COUNT: 15.8 % (ref 12.3–15.4)
GLUCOSE SERPL-MCNC: 84 MG/DL (ref 65–99)
HCT VFR BLD AUTO: 31 % (ref 34–46.6)
HGB BLD-MCNC: 10.1 G/DL (ref 12–15.9)
IMM GRANULOCYTES # BLD AUTO: 0.02 10*3/MM3 (ref 0–0.05)
IMM GRANULOCYTES NFR BLD AUTO: 0.6 % (ref 0–0.5)
LYMPHOCYTES # BLD AUTO: 1.5 10*3/MM3 (ref 0.7–3.1)
LYMPHOCYTES NFR BLD AUTO: 42.7 % (ref 19.6–45.3)
MCH RBC QN AUTO: 31.7 PG (ref 26.6–33)
MCHC RBC AUTO-ENTMCNC: 32.6 G/DL (ref 31.5–35.7)
MCV RBC AUTO: 97.2 FL (ref 79–97)
MONOCYTES # BLD AUTO: 0.42 10*3/MM3 (ref 0.1–0.9)
MONOCYTES NFR BLD AUTO: 12 % (ref 5–12)
NEUTROPHILS NFR BLD AUTO: 1.37 10*3/MM3 (ref 1.7–7)
NEUTROPHILS NFR BLD AUTO: 39 % (ref 42.7–76)
NRBC BLD AUTO-RTO: 0 /100 WBC (ref 0–0.2)
PLATELET # BLD AUTO: 334 10*3/MM3 (ref 140–450)
PMV BLD AUTO: 9 FL (ref 6–12)
POTASSIUM SERPL-SCNC: 4 MMOL/L (ref 3.5–5.2)
RBC # BLD AUTO: 3.19 10*6/MM3 (ref 3.77–5.28)
SODIUM SERPL-SCNC: 140 MMOL/L (ref 136–145)
WBC NRBC COR # BLD: 3.51 10*3/MM3 (ref 3.4–10.8)

## 2022-11-02 PROCEDURE — 85025 COMPLETE CBC W/AUTO DIFF WBC: CPT | Performed by: INTERNAL MEDICINE

## 2022-11-02 PROCEDURE — 80048 BASIC METABOLIC PNL TOTAL CA: CPT | Performed by: INTERNAL MEDICINE

## 2022-11-02 PROCEDURE — 97116 GAIT TRAINING THERAPY: CPT

## 2022-11-02 RX ADMIN — MIRTAZAPINE 30 MG: 30 TABLET, FILM COATED ORAL at 20:01

## 2022-11-02 RX ADMIN — FLUDROCORTISONE ACETATE 100 MCG: 0.1 TABLET ORAL at 08:18

## 2022-11-02 RX ADMIN — FOLIC ACID 1000 MCG: 1 TABLET ORAL at 08:18

## 2022-11-02 RX ADMIN — Medication 5 MG: at 20:01

## 2022-11-02 RX ADMIN — LEVOTHYROXINE SODIUM 100 MCG: 0.1 TABLET ORAL at 08:18

## 2022-11-02 RX ADMIN — ATORVASTATIN CALCIUM 40 MG: 20 TABLET, FILM COATED ORAL at 08:18

## 2022-11-02 RX ADMIN — VENLAFAXINE HYDROCHLORIDE 75 MG: 75 CAPSULE, EXTENDED RELEASE ORAL at 08:18

## 2022-11-02 RX ADMIN — BUPROPION HYDROCHLORIDE 150 MG: 150 TABLET, EXTENDED RELEASE ORAL at 08:18

## 2022-11-02 NOTE — CASE MANAGEMENT/SOCIAL WORK
Continued Stay Note  [facility]     Patient Name: Rekha Campos  MRN: [id number]  Today's Date: 11/2/2022    Admit Date: 10/25/2022    Plan: Referral pending to [facility] pending; will need pre-cer   Discharge Plan     Row Name 11/02/22 1510       Plan    Plan Referral pending to [facility] pending; will need pre-cer    Patient/Family in Agreement with Plan yes    Plan Comments Spoke with [facility] and they are out of network with pts insurance. CCP spoke with Karli/Catherine, [facility] is full but can check pts benefits for [facility]. Await call back. Spoke with dtfidencio Muñoz with update and if [facility] can accept then they wish to proceed with rehab there. Partial Packet in ccp office. MARIPOSA rn/ccp    Row Name 11/02/22 1675       Plan    Plan SNF referrals pending, will need pre-cert    Patient/Family in Agreement with Plan yes    Provided Post Acute Provider List? Yes    Post Acute Provider List Inpatient Rehab    Provided Post Acute Provider Quality & Resource List? Yes    Post Acute Provider Quality and Resource List Inpatient Rehab    Delivered To Support Person    Method of Delivery Telephone    Plan Comments Spoke with [facility] and [facility], no beds available this week. [facility] called dtfidencio Benitez [phone] and requested additional SNF choices, reviewed medicare.gov, explained Hospital unable to give recommendations. She would like [facility] to check, [facility], [facility] and [facility]. CCP notified Zeny/Mike and [facility]li/Catherine. Pt will need [employer] MCR pre-cert. Partial Packet in ccp office. Mariposa PEDERSEN/CCP               Discharge Codes    No documentation.               Expected Discharge Date and Time     Expected Discharge Date Expected Discharge Time    Nov 3, 2022             Dea Zavala RN

## 2022-11-02 NOTE — THERAPY TREATMENT NOTE
Patient Name: Rekha Campos  : 1940    MRN: [id number]                              Today's Date: 2022       Admit Date: 10/25/2022    Visit Dx:     ICD-10-CM ICD-9-CM   1. Near syncope  R55 780.2   2. Anemia, unspecified type  D64.9 285.9   3. Generalized weakness  R53.1 780.79   4. Precipitous drop in hematocrit   R71.0 790.01   5. Pancytopenia (HCC)  D61.818 284.19   6. Malignant neoplasm of areola of right breast in female, unspecified estrogen receptor status (HCC)  C50.011 174.0   7. Orthostatic hypotension  I95.1 458.0     Patient Active Problem List   Diagnosis   • Malignant neoplasm of areola of right breast in female (HCC)   • Osteoporosis   • Osteopenia of multiple sites   • Symptomatic anemia   • Near syncope   • Hypothyroidism (acquired)   • HTN (hypertension)   • Hypokalemia   • Orthostatic hypotension   • Other neutropenia (HCC)     Past Medical History:   Diagnosis Date   • Anxiety    • Cancer (HCC)     Stage I, right breast cancer   • Depression    • Disease of thyroid gland     hypothyroidusm   • Erythrocytosis    • Hypercholesterolemia    • Hypertension    • Osteoporosis     osteopenia mild     Past Surgical History:   Procedure Laterality Date   • BREAST LUMPECTOMY Right    • COLONOSCOPY     • EYE SURGERY  2004    corneal transplant   • HYSTERECTOMY     • SHOULDER SURGERY        General Information     Row Name 22 1626          Physical Therapy Time and Intention    Document Type therapy note (daily note)  -     Mode of Treatment physical therapy  -     Row Name 22 1626          General Information    Existing Precautions/Restrictions orthostatic hypotension;fall  -           User Key  (r) = Recorded By, (t) = Taken By, (c) = Cosigned By    Initials Name Provider Type    Mary Jane Calderon, PT Physical Therapist               Mobility     Row Name 22 1628          Bed Mobility    Supine-Sit [place] (Bed Mobility) standby assist  -      Sit-Supine [place] (Bed Mobility) standby assist  -EJ     Assistive Device (Bed Mobility) head of bed elevated;bed rails  -EJ     Row Name 11/02/22 1628          Sit-Stand Transfer    Sit-Stand [place] (Transfers) contact guard;verbal cues  -EJ     Assistive Device (Sit-Stand Transfers) walker, front-wheeled  -EJ     Row Name 11/02/22 1628          Gait/Stairs (Locomotion)    [place] Level (Gait) verbal cues;minimum assist (75% patient effort)  -EJ     Assistive Device (Gait) walker, front-wheeled  -EJ     Distance in Feet (Gait) 25' x  1, 30' x 1  -EJ     Deviations/Abnormal Patterns (Gait) ro decreased;stride length decreased  -EJ     Bilateral Gait Deviations forward flexed posture  -EJ     Comment, (Gait/Stairs) mild unsteadiness, limited by fatigue and general BLE weakness. seated rest required in between walks  -EJ           User Key  (r) = Recorded By, (t) = Taken By, (c) = Cosigned By    Initials Name Provider Type    Mary Jane Calderon, PT Physical Therapist               Obj/Interventions    No documentation.                Goals/Plan    No documentation.                Clinical Impression     Row Name 11/02/22 1630          Pain    Pretreatment Pain Rating 0/10 - no pain  -EJ     Posttreatment Pain Rating 0/10 - no pain  -EJ     Row Name 11/02/22 1630          Plan of Care Review    Plan of Care Reviewed With patient  -EJ     Progress improving  -EJ     Outcome Evaluation Pt agreeable to PT this afternoon. She is doing well and continues to report that her dizziness has improved today. Pt able to increase activity and ambulation distance today. Today, pt used Rwx w ambulation and did show some improved balance. She was able to ambulate 25'x 1 and then another 30 ft x 1 w Rwx after seated rest break. She still exhibits some unsteadiness, but improved w using walker. Pt limited by general BLE weakness and decreased activity tolerance. Pt now plans SNU prior to returning home. Will  continue to progress as tolerated.  -EJ     Row Name 11/02/22 1630          Positioning and Restraints    Pre-Treatment Position in bed  -EJ     Post Treatment Position bed  -EJ     In Bed notified nsg;supine;call light within reach;encouraged to call for assist;exit alarm on  -EJ           User Key  (r) = Recorded By, (t) = Taken By, (c) = Cosigned By    Initials Name Provider Type    Mary Jane Calderon, PT Physical Therapist               Outcome Measures     Row Name 11/02/22 1633 11/02/22 0830       How much help from another person do you currently need...    Turning from your back to your side while in flat bed without using bedrails? 3  -EJ 3  -KA    Moving from lying on back to sitting on the side of a flat bed without bedrails? 3  -EJ 3  -KA    Moving to and from a bed to a chair (including a wheelchair)? 3  -EJ 3  -KA    Standing up from a chair using your arms (e.g., wheelchair, bedside chair)? 3  -EJ 3  -KA    Climbing 3-5 steps with a railing? 3  -EJ 3  -KA    To walk in hospital room? 3  -EJ 3  -KA    AM-PAC 6 Clicks Score (PT) 18  -EJ 18  -KA    Highest level of mobility 6 --> Walked 10 steps or more  -EJ 6 --> Walked 10 steps or more  -KA          User Key  (r) = Recorded By, (t) = Taken By, (c) = Cosigned By    Initials Name Provider Type    Mary Jane Calderon, PT Physical Therapist    Tri Robb RN Registered Nurse                             Physical Therapy Education     Title: PT OT SLP Therapies (Done)     Topic: Physical Therapy (Done)     Point: Mobility training (Done)     Learning Progress Summary           Patient Acceptance, E,TB,D, VU,NR by KATERINA at 11/2/2022 1634    Acceptance, E,TB,D, VU by KATERINA at 10/31/2022 1159    Acceptance, E, VU,NR by  at 10/29/2022 1447    Acceptance, E,TB,D, VU,NR by  at 10/28/2022 1319    Acceptance, E, NR by  at 10/26/2022 1149                   Point: Home exercise program (Done)     Learning Progress Summary           Patient Acceptance,  E,TB,D, VU,NR by  at 10/28/2022 1319    Acceptance, E, NR by  at 10/26/2022 1149                   Point: Body mechanics (Done)     Learning Progress Summary           Patient Acceptance, E,TB,D, VU,NR by  at 10/28/2022 1319    Acceptance, E, NR by  at 10/26/2022 1149                   Point: Precautions (Done)     Learning Progress Summary           Patient Acceptance, E, VU,NR by  at 10/29/2022 1447    Acceptance, E,TB,D, VU,NR by  at 10/28/2022 1319    Acceptance, E, NR by  at 10/26/2022 1149                               User Key     Initials Effective Dates Name Provider Type Discipline     06/16/21 -  Mony Au, PT Physical Therapist PT     06/16/21 -  Esther Thacker, PT Physical Therapist PT     06/16/21 -  Mary Jane Stack, PT Physical Therapist PT     06/16/21 -  Jacklyn De La Rosa, PT Physical Therapist PT              PT Recommendation and Plan     Plan of Care Reviewed With: patient  Progress: improving  Outcome Evaluation: Pt agreeable to PT this afternoon. She is doing well and continues to report that her dizziness has improved today. Pt able to increase activity and ambulation distance today. Today, pt used Rwx w ambulation and did show some improved balance. She was able to ambulate 25'x 1 and then another 30 ft x 1 w Rwx after seated rest break. She still exhibits some unsteadiness, but improved w using walker. Pt limited by general BLE weakness and decreased activity tolerance. Pt now plans SNU prior to returning home. Will continue to progress as tolerated.     Time Calculation:    PT Charges     Row Name 11/02/22 1635             Time Calculation    Start Time 1613  -EJ      Stop Time 1628  -EJ      Time Calculation (min) 15 min  -EJ      PT Received On 11/02/22  -EJ      PT - Next Appointment 11/03/22  -EJ            User Key  (r) = Recorded By, (t) = Taken By, (c) = Cosigned By    Initials Name Provider Type    EJ Mary Jane Stack, PT Physical Therapist               Therapy Charges for Today     Code Description Service Date Service Provider Modifiers Qty    66512281957 HC PT THER PROC EA 15 MIN 11/1/2022 Mary Jane Stack, PT GP 1    80544023664 HC GAIT TRAINING EA 15 MIN 11/1/2022 Mary Jane Stack, PT GP 1    07193614373 HC GAIT TRAINING EA 15 MIN 11/2/2022 Mary Jane Stack, PT GP 1          PT G-Codes  Outcome Measure Options: AM-PAC 6 Clicks Basic Mobility (PT)  AM-PAC 6 Clicks Score (PT): 18    Mary Jane Stack, PT  11/2/2022

## 2022-11-02 NOTE — PLAN OF CARE
Goal Outcome Evaluation:  Plan of Care Reviewed With: patient        Progress: improving  Outcome Evaluation: Pt agreeable to PT this afternoon. She is doing well and continues to report that her dizziness has improved today. Pt able to increase activity and ambulation distance today. Today, pt used Rwx w ambulation and did show some improved balance. She was able to ambulate 25'x 1 and then another 30 ft x 1 w Rwx after seated rest break. She still exhibits some unsteadiness, but improved w using walker. Pt limited by general BLE weakness and decreased activity tolerance. Pt now plans SNU prior to returning home. Will continue to progress as tolerated.

## 2022-11-02 NOTE — CASE MANAGEMENT/SOCIAL WORK
Continued Stay Note  [facility]     Patient Name: Rekha Campos  MRN: [id number]  Today's Date: 11/2/2022    Admit Date: 10/25/2022    Plan: SNF referrals pending- will need pre-cert   Discharge Plan     Row Name 11/02/22 1721       Plan    Plan SNF referrals pending- will need pre-cert    Patient/Family in Agreement with Plan yes    Plan Comments Spoke with Les beds at [facility] but can offer a semi-private bed at [facility], however pts insurance plan in network will have a copay of $75.00 a day starting on day 1. CCP called pts daughter Brianna with update and she states pts spouse has same plan was just at [facility] and no copay. CCP encouraged them to call pts insurance company and would speak with [facility] rep. Brianna does not think pt would want a semi private and could CCP make other area referrals for availablity. CCP called Karli/Catherine and she will check. CCP made additional SNF referrals to [facility], [facility], [facility] and [facility]. CCP to follow up tomorrow. Packet in ccp office. Mariposa PEDERSEN/CCP    Row Name 11/02/22 1510       Plan    Plan Referral pending to [facility] pending; will need pre-cer    Patient/Family in Agreement with Plan yes    Plan Comments Spoke with Zeny/Mike and they are out of network with pts insurance. CCP spoke with Karli/Catherine, [facility] is full but can check pts benefits for [facility]. Await call back. Spoke with po Muñoz with update and if [facility] can accept then they wish to proceed with rehab there. Partial Packet in ccp office. MARIPOSA pedersen/ccp               Discharge Codes    No documentation.               Expected Discharge Date and Time     Expected Discharge Date Expected Discharge Time    Nov 3, 2022             Dea Zavala RN

## 2022-11-02 NOTE — DISCHARGE PLACEMENT REQUEST
"Daniel Campos (82 y.o. Female)     Date of Birth   1940    Social Security Number       Address   [address] [place] [address]    Home Phone   [phone]    MRN   [id number]       Zoroastrian   Unknown    Marital Status                               Admission Date   10/25/22    Admission Type   Emergency    Admitting Provider   Lazarus Morgan MD    Attending Provider   Lazarus Morgan MD    Department, Room/Bed   [address], [address]       Discharge Date       Discharge Disposition       Discharge Destination                               Attending Provider: Lazarus Morgan MD    Allergies: Ciprofloxacin    Isolation: None   Infection: None   Code Status: CPR    Ht: 172.7 cm (68\")   Wt: 68.8 kg (151 lb 11.2 oz)    Admission Cmt: None   Principal Problem: Near syncope [R55]                 Active Insurance as of 10/25/2022     Primary Coverage     Payor Plan Insurance Group Employer/Plan Group    [employer]ET[employer] MEDICARE REPLACEMENT A[employer] MEDICARE REPLACEMENT [id number]     Payor Plan Address Payor Plan Phone Number Payor Plan Fax Number Effective Dates    [address] [phone]  1/1/2022 - None Entered    [facility] [id number]       Subscriber Name Subscriber Birth Date Member ID       DANIEL CAMPOS 1940 [id number]                 Emergency Contacts      (Rel.) Home Phone Work Phone Mobile Phone    Sami Campos (Spouse) [phone] -- [phone]    Brianna Zuleta (Daughter) [phone] -- --              "

## 2022-11-02 NOTE — PROGRESS NOTES
" LOS: 6 days     Name: Rekha Campos  Age: 82 y.o.  Sex: female  :  1940  MRN: [id number]         Primary Care Physician: Roseanne Sanchez MD    Subjective   Subjective  No new complaints or acute overnight events.  Denies dizziness or lightheadedness.    Objective   Vital Signs  Temp:  [98.1 °F (36.7 °C)-98.4 °F (36.9 °C)] 98.1 °F (36.7 °C)  Heart Rate:  [66-81] 81  Resp:  [16-18] 16  BP: (128-176)/(80-95) 159/90  Body mass index is 23.07 kg/m².    Objective:  General Appearance:  Comfortable and in no acute distress (Elderly and deconditioned appearing).    Vital signs: (most recent): Blood pressure 159/90, pulse 81, temperature 98.1 °F (36.7 °C), temperature source Oral, resp. rate 16, height 172.7 cm (68\"), weight 68.8 kg (151 lb 11.2 oz), SpO2 92 %, not currently breastfeeding.    Lungs:  Normal effort and normal respiratory rate.    Heart: Normal rate.  Regular rhythm.    Abdomen: Abdomen is soft.  Bowel sounds are normal.   There is no abdominal tenderness.     Extremities: There is no dependent edema or local swelling.    Neurological: Patient is alert and oriented to person, place and time.    Skin:  Warm and dry.              Results Review:       I reviewed the patient's new clinical results.    Results from last 7 days   Lab Units 11/02/22  0526 11/01/22  0547 10/31/22  0524 10/30/22  1601 10/27/22  0502   WBC 10*3/mm3 3.51 3.44 3.79 4.15 4.03   HEMOGLOBIN g/dL 10.1* 9.8* 9.9* 10.1* 10.0*   PLATELETS 10*3/mm3 334 326 378 372 340     Results from last 7 days   Lab Units 11/02/22  0526 11/01/22  0547 10/31/22  0524 10/30/22  1601 10/27/22  0501   SODIUM mmol/L 140 139 140 136 140   POTASSIUM mmol/L 4.0 4.2 3.5 3.2* 3.5   CHLORIDE mmol/L 101 104 103 101 103   CO2 mmol/L 28.4 26.0 28.4 27.4 28.6   BUN mg/dL 10 11 8 10 7*   CREATININE mg/dL 0.52* 0.52* 0.66 0.73 0.64   CALCIUM mg/dL 8.7 8.8 8.6 8.9 8.9   GLUCOSE mg/dL 84 88 88 103* 80                 Scheduled Meds:   acetaminophen, 650 mg, " Oral, Once  atorvastatin, 40 mg, Oral, Daily  buPROPion XL, 150 mg, Oral, Daily  diphenhydrAMINE, 25 mg, Oral, Once  fludrocortisone, 100 mcg, Oral, Daily  folic acid, 1,000 mcg, Oral, Daily  levothyroxine, 100 mcg, Oral, Daily  midodrine, 2.5 mg, Oral, TID AC  mirtazapine, 30 mg, Oral, Nightly  venlafaxine XR, 75 mg, Oral, Daily      PRN Meds:   •  acetaminophen  •  melatonin  •  potassium chloride  •  potassium chloride  •  sodium chloride  •  sodium chloride  Continuous Infusions:       Assessment & Plan   Active Hospital Problems    Diagnosis  POA   • **Near syncope [R55]  Yes   • Other neutropenia (HCC) [D70.8]  Yes   • Orthostatic hypotension [I95.1]  Yes   • Hypothyroidism (acquired) [E03.9]  Yes   • HTN (hypertension) [I10]  Yes   • Hypokalemia [E87.6]  Yes   • Symptomatic anemia [D64.9]  Yes      Resolved Hospital Problems    Diagnosis Date Resolved POA   • Hyponatremia [E87.1] 10/29/2022 Yes       Assessment & Plan    Presyncope/Orthostatic Hypotension  - she has received IVF and PRBCs without much change  - echocardiogram is okay  - continue compression stockings and abdominal binder  - continue fludrocortisone 100mcg daily and midodrine  - appreciate neurology and cardiology evaluations- they have signed off     Macrocytic Anemia/neutropenia  - no nutritional deficiencies, negative peripheral blood flow cytometry  - s/p bone marrow biopsy 10/27/22  - appreciate hematology/oncology recs, follow up with Dr. Knox in 2-3 weeks     HTN  - home meds stopped due to orthostatic hypotension     Hypothyroidism  - continue levothyroxine     Depression  - controlled  - continue current regimen     HLD  - continue lipitor     SCDs for DVT prophylaxis.  Full code.  Discussed with patient, nursing staff and CCP.  Anticipate discharge to SNU facility once precert has been obtained.    Assessment and plan reviewed by me today    Lazarus Morgan MD  [facility]  11/02/22  12:46 EDT

## 2022-11-02 NOTE — CASE MANAGEMENT/SOCIAL WORK
Continued Stay Note  [facility]     Patient Name: Rekha Campos  MRN: [id number]  Today's Date: 11/2/2022    Admit Date: 10/25/2022    Plan: SNF referrals pending, will need pre-cert   Discharge Plan     Row Name 11/02/22 1235       Plan    Plan SNF referrals pending, will need pre-cert    Patient/Family in Agreement with Plan yes    Provided Post Acute Provider List? Yes    Post Acute Provider List Inpatient Rehab    Provided Post Acute Provider Quality & Resource List? Yes    Post Acute Provider Quality and Resource List Inpatient Rehab    Delivered To Support Person    Method of Delivery Telephone    Plan Comments Spoke with Lizbeth/[facility] and [facility], no beds available this week. CCP called dtr Eli [phone] and requested additional SNF choices, reviewed medicare.gov, explained Hospital unable to give recommendations. She would like [facility] to check, [facility], [facility] and [facility]. CCP notified Zeny/[place] and Karli/[facility]. Pt will need [employer] MCR pre-cert. Partial Packet in ccp office. Mariposa PEDERSEN/CCP               Discharge Codes    No documentation.               Expected Discharge Date and Time     Expected Discharge Date Expected Discharge Time    Nov 3, 2022             Dea Zavala RN

## 2022-11-03 LAB
ANION GAP SERPL CALCULATED.3IONS-SCNC: 8 MMOL/L (ref 5–15)
BASOPHILS # BLD AUTO: 0.03 10*3/MM3 (ref 0–0.2)
BASOPHILS NFR BLD AUTO: 0.9 % (ref 0–1.5)
BUN SERPL-MCNC: 11 MG/DL (ref 8–23)
BUN/CREAT SERPL: 17.5 (ref 7–25)
CALCIUM SPEC-SCNC: 8.6 MG/DL (ref 8.6–10.5)
CHLORIDE SERPL-SCNC: 104 MMOL/L (ref 98–107)
CO2 SERPL-SCNC: 27 MMOL/L (ref 22–29)
CREAT SERPL-MCNC: 0.63 MG/DL (ref 0.57–1)
DEPRECATED RDW RBC AUTO: 55.3 FL (ref 37–54)
EGFRCR SERPLBLD CKD-EPI 2021: 88.7 ML/MIN/1.73
EOSINOPHIL # BLD AUTO: 0.19 10*3/MM3 (ref 0–0.4)
EOSINOPHIL NFR BLD AUTO: 5.5 % (ref 0.3–6.2)
ERYTHROCYTE [DISTWIDTH] IN BLOOD BY AUTOMATED COUNT: 15.8 % (ref 12.3–15.4)
GLUCOSE SERPL-MCNC: 85 MG/DL (ref 65–99)
HCT VFR BLD AUTO: 30.8 % (ref 34–46.6)
HGB BLD-MCNC: 10.1 G/DL (ref 12–15.9)
IMM GRANULOCYTES # BLD AUTO: 0.01 10*3/MM3 (ref 0–0.05)
IMM GRANULOCYTES NFR BLD AUTO: 0.3 % (ref 0–0.5)
LYMPHOCYTES # BLD AUTO: 1.44 10*3/MM3 (ref 0.7–3.1)
LYMPHOCYTES NFR BLD AUTO: 41.6 % (ref 19.6–45.3)
MCH RBC QN AUTO: 31.4 PG (ref 26.6–33)
MCHC RBC AUTO-ENTMCNC: 32.8 G/DL (ref 31.5–35.7)
MCV RBC AUTO: 95.7 FL (ref 79–97)
MONOCYTES # BLD AUTO: 0.46 10*3/MM3 (ref 0.1–0.9)
MONOCYTES NFR BLD AUTO: 13.3 % (ref 5–12)
NEUTROPHILS NFR BLD AUTO: 1.33 10*3/MM3 (ref 1.7–7)
NEUTROPHILS NFR BLD AUTO: 38.4 % (ref 42.7–76)
NRBC BLD AUTO-RTO: 0 /100 WBC (ref 0–0.2)
PLATELET # BLD AUTO: 313 10*3/MM3 (ref 140–450)
PMV BLD AUTO: 8.6 FL (ref 6–12)
POTASSIUM SERPL-SCNC: 3.8 MMOL/L (ref 3.5–5.2)
RBC # BLD AUTO: 3.22 10*6/MM3 (ref 3.77–5.28)
SODIUM SERPL-SCNC: 139 MMOL/L (ref 136–145)
WBC NRBC COR # BLD: 3.46 10*3/MM3 (ref 3.4–10.8)

## 2022-11-03 PROCEDURE — 85025 COMPLETE CBC W/AUTO DIFF WBC: CPT | Performed by: INTERNAL MEDICINE

## 2022-11-03 PROCEDURE — 80048 BASIC METABOLIC PNL TOTAL CA: CPT | Performed by: INTERNAL MEDICINE

## 2022-11-03 RX ADMIN — MIRTAZAPINE 30 MG: 30 TABLET, FILM COATED ORAL at 20:28

## 2022-11-03 RX ADMIN — BUPROPION HYDROCHLORIDE 150 MG: 150 TABLET, EXTENDED RELEASE ORAL at 08:55

## 2022-11-03 RX ADMIN — Medication 5 MG: at 20:28

## 2022-11-03 RX ADMIN — ATORVASTATIN CALCIUM 40 MG: 20 TABLET, FILM COATED ORAL at 08:58

## 2022-11-03 RX ADMIN — LEVOTHYROXINE SODIUM 100 MCG: 0.1 TABLET ORAL at 08:55

## 2022-11-03 RX ADMIN — Medication 10 ML: at 08:56

## 2022-11-03 RX ADMIN — VENLAFAXINE HYDROCHLORIDE 75 MG: 75 CAPSULE, EXTENDED RELEASE ORAL at 08:55

## 2022-11-03 RX ADMIN — FLUDROCORTISONE ACETATE 100 MCG: 0.1 TABLET ORAL at 08:55

## 2022-11-03 RX ADMIN — FOLIC ACID 1000 MCG: 1 TABLET ORAL at 08:55

## 2022-11-03 NOTE — CASE MANAGEMENT/SOCIAL WORK
Continued Stay Note  [facility]     Patient Name: Rekha Campos  MRN: [id number]  Today's Date: 11/3/2022    Admit Date: 10/25/2022    Plan: SNF pendinf acceptance and precert   Discharge Plan     Row Name 11/03/22 1018       Plan    Plan SNF pendinf acceptance and precert    Plan Comments Spoke with Karli she reports [facility] is rechecking benefits if they can accept she will start precert.               Discharge Codes    No documentation.               Expected Discharge Date and Time     Expected Discharge Date Expected Discharge Time    Nov 3, 2022             ARELIS Casey

## 2022-11-03 NOTE — PROGRESS NOTES
" LOS: 7 days     Name: Rekha Campos  Age: 82 y.o.  Sex: female  :  1940  MRN: [id number]         Primary Care Physician: Roseanne Sanchez MD    Subjective   Subjective  No new complaints.  No acute overnight events.  Blood pressures have remained stable.    Objective   Vital Signs  Temp:  [98 °F (36.7 °C)-98.6 °F (37 °C)] 98 °F (36.7 °C)  Heart Rate:  [67-87] 71  Resp:  [16] 16  BP: (122-161)/(87-93) 161/93  Body mass index is 23.07 kg/m².    Objective:  General Appearance:  Comfortable and in no acute distress.    Vital signs: (most recent): Blood pressure 161/93, pulse 71, temperature 98 °F (36.7 °C), temperature source Oral, resp. rate 16, height 172.7 cm (68\"), weight 68.8 kg (151 lb 11.2 oz), SpO2 92 %, not currently breastfeeding.    Lungs:  Normal effort and normal respiratory rate.    Heart: Normal rate.  Regular rhythm.    Abdomen: Abdomen is soft.  Bowel sounds are normal.   There is no abdominal tenderness.     Extremities: There is no dependent edema or local swelling.    Neurological: Patient is alert and oriented to person, place and time.    Skin:  Warm and dry.              Results Review:       I reviewed the patient's new clinical results.    Results from last 7 days   Lab Units 11/03/22  0507 11/02/22  0526 11/01/22  0547 10/31/22  0524 10/30/22  1601   WBC 10*3/mm3 3.46 3.51 3.44 3.79 4.15   HEMOGLOBIN g/dL 10.1* 10.1* 9.8* 9.9* 10.1*   PLATELETS 10*3/mm3 313 334 326 378 372     Results from last 7 days   Lab Units 11/03/22  0507 11/02/22  0526 11/01/22  0547 10/31/22  0524 10/30/22  1601   SODIUM mmol/L 139 140 139 140 136   POTASSIUM mmol/L 3.8 4.0 4.2 3.5 3.2*   CHLORIDE mmol/L 104 101 104 103 101   CO2 mmol/L 27.0 28.4 26.0 28.4 27.4   BUN mg/dL 11 10 11 8 10   CREATININE mg/dL 0.63 0.52* 0.52* 0.66 0.73   CALCIUM mg/dL 8.6 8.7 8.8 8.6 8.9   GLUCOSE mg/dL 85 84 88 88 103*                 Scheduled Meds:   acetaminophen, 650 mg, Oral, Once  atorvastatin, 40 mg, Oral, " Daily  buPROPion XL, 150 mg, Oral, Daily  diphenhydrAMINE, 25 mg, Oral, Once  fludrocortisone, 100 mcg, Oral, Daily  folic acid, 1,000 mcg, Oral, Daily  levothyroxine, 100 mcg, Oral, Daily  mirtazapine, 30 mg, Oral, Nightly  venlafaxine XR, 75 mg, Oral, Daily      PRN Meds:   •  acetaminophen  •  melatonin  •  potassium chloride  •  potassium chloride  •  sodium chloride  •  sodium chloride  Continuous Infusions:       Assessment & Plan   Active Hospital Problems    Diagnosis  POA   • **Near syncope [R55]  Yes   • Other neutropenia (HCC) [D70.8]  Yes   • Orthostatic hypotension [I95.1]  Yes   • Hypothyroidism (acquired) [E03.9]  Yes   • HTN (hypertension) [I10]  Yes   • Hypokalemia [E87.6]  Yes   • Symptomatic anemia [D64.9]  Yes      Resolved Hospital Problems    Diagnosis Date Resolved POA   • Hyponatremia [E87.1] 10/29/2022 Yes       Assessment & Plan    Presyncope/Orthostatic Hypotension  - she has received IVF and PRBCs without much change  - echocardiogram is okay  - continue compression stockings and abdominal binder  - continue fludrocortisone 100mcg daily  - Blood pressures have looked better and not required midodrine in the past 24 hours.  Will discontinue for now and observe.  - appreciate neurology and cardiology evaluations- they have signed off     Macrocytic Anemia/neutropenia  - no nutritional deficiencies, negative peripheral blood flow cytometry  - s/p bone marrow biopsy 10/27/22  - appreciate hematology/oncology recs, follow up with Dr. Knox in 2-3 weeks     HTN  - home meds stopped due to orthostatic hypotension     Hypothyroidism  - continue levothyroxine     Depression  - controlled  - continue current regimen     HLD  - continue lipitor     SCDs for DVT prophylaxis.  Full code.  Discussed with patient, nursing staff and CCP.  Anticipate discharge to SNU facility once precert has been obtained.      Lazarus Morgan MD  [facility]  11/03/22  12:59 EDT

## 2022-11-03 NOTE — CASE MANAGEMENT/SOCIAL WORK
Continued Stay Note  [facility]     Patient Name: Rekha Campos  MRN: [id number]  Today's Date: 11/3/2022    Admit Date: 10/25/2022    Plan: [facility] SNF pending [employer] [facility] pre-cert   Discharge Plan     Row Name 11/03/22 1421       Plan    Plan [facility] SNF pending [employer] [facility] pre-cert    Patient/Family in Agreement with Plan yes    Plan Comments Spoke with Karli/[facility], reviewed benefits, no out of pocket cost until day 21. CCP spoke with both dtlaura Reed and Eli and plan is [facility] SNF. Awaiting pre-cert. Eli states she can transport pt. Packet in ccp office. Mariposa PEDERSEN/CCP               Discharge Codes    No documentation.               Expected Discharge Date and Time     Expected Discharge Date Expected Discharge Time    Nov 3, 2022             Dea Zavala RN

## 2022-11-04 LAB
ANION GAP SERPL CALCULATED.3IONS-SCNC: 11.6 MMOL/L (ref 5–15)
BASOPHILS # BLD AUTO: 0.03 10*3/MM3 (ref 0–0.2)
BASOPHILS NFR BLD AUTO: 0.9 % (ref 0–1.5)
BUN SERPL-MCNC: 14 MG/DL (ref 8–23)
BUN/CREAT SERPL: 24.6 (ref 7–25)
CALCIUM SPEC-SCNC: 8.8 MG/DL (ref 8.6–10.5)
CHLORIDE SERPL-SCNC: 103 MMOL/L (ref 98–107)
CO2 SERPL-SCNC: 25.4 MMOL/L (ref 22–29)
CREAT SERPL-MCNC: 0.57 MG/DL (ref 0.57–1)
DEPRECATED RDW RBC AUTO: 52.4 FL (ref 37–54)
EGFRCR SERPLBLD CKD-EPI 2021: 90.9 ML/MIN/1.73
EOSINOPHIL # BLD AUTO: 0.17 10*3/MM3 (ref 0–0.4)
EOSINOPHIL NFR BLD AUTO: 5.3 % (ref 0.3–6.2)
ERYTHROCYTE [DISTWIDTH] IN BLOOD BY AUTOMATED COUNT: 15.3 % (ref 12.3–15.4)
GLUCOSE SERPL-MCNC: 84 MG/DL (ref 65–99)
HCT VFR BLD AUTO: 30.1 % (ref 34–46.6)
HGB BLD-MCNC: 9.9 G/DL (ref 12–15.9)
IMM GRANULOCYTES # BLD AUTO: 0.01 10*3/MM3 (ref 0–0.05)
IMM GRANULOCYTES NFR BLD AUTO: 0.3 % (ref 0–0.5)
LYMPHOCYTES # BLD AUTO: 1.28 10*3/MM3 (ref 0.7–3.1)
LYMPHOCYTES NFR BLD AUTO: 39.9 % (ref 19.6–45.3)
MCH RBC QN AUTO: 30.7 PG (ref 26.6–33)
MCHC RBC AUTO-ENTMCNC: 32.9 G/DL (ref 31.5–35.7)
MCV RBC AUTO: 93.5 FL (ref 79–97)
MONOCYTES # BLD AUTO: 0.42 10*3/MM3 (ref 0.1–0.9)
MONOCYTES NFR BLD AUTO: 13.1 % (ref 5–12)
NEUTROPHILS NFR BLD AUTO: 1.3 10*3/MM3 (ref 1.7–7)
NEUTROPHILS NFR BLD AUTO: 40.5 % (ref 42.7–76)
NRBC BLD AUTO-RTO: 0 /100 WBC (ref 0–0.2)
PLATELET # BLD AUTO: 306 10*3/MM3 (ref 140–450)
PMV BLD AUTO: 8.9 FL (ref 6–12)
POTASSIUM SERPL-SCNC: 3.6 MMOL/L (ref 3.5–5.2)
RBC # BLD AUTO: 3.22 10*6/MM3 (ref 3.77–5.28)
SODIUM SERPL-SCNC: 140 MMOL/L (ref 136–145)
WBC NRBC COR # BLD: 3.21 10*3/MM3 (ref 3.4–10.8)

## 2022-11-04 PROCEDURE — 80048 BASIC METABOLIC PNL TOTAL CA: CPT | Performed by: INTERNAL MEDICINE

## 2022-11-04 PROCEDURE — 85025 COMPLETE CBC W/AUTO DIFF WBC: CPT | Performed by: INTERNAL MEDICINE

## 2022-11-04 RX ADMIN — BUPROPION HYDROCHLORIDE 150 MG: 150 TABLET, EXTENDED RELEASE ORAL at 09:32

## 2022-11-04 RX ADMIN — VENLAFAXINE HYDROCHLORIDE 75 MG: 75 CAPSULE, EXTENDED RELEASE ORAL at 09:32

## 2022-11-04 RX ADMIN — Medication 5 MG: at 20:38

## 2022-11-04 RX ADMIN — FOLIC ACID 1000 MCG: 1 TABLET ORAL at 09:32

## 2022-11-04 RX ADMIN — FLUDROCORTISONE ACETATE 100 MCG: 0.1 TABLET ORAL at 09:32

## 2022-11-04 RX ADMIN — MIRTAZAPINE 30 MG: 30 TABLET, FILM COATED ORAL at 20:38

## 2022-11-04 RX ADMIN — ATORVASTATIN CALCIUM 40 MG: 20 TABLET, FILM COATED ORAL at 09:36

## 2022-11-04 RX ADMIN — LEVOTHYROXINE SODIUM 100 MCG: 0.1 TABLET ORAL at 09:32

## 2022-11-04 NOTE — PROGRESS NOTES
" LOS: 8 days     Name: Rekha Campos  Age: 82 y.o.  Sex: female  :  1940  MRN: [id number]         Primary Care Physician: Roseanne Sanchez MD    Subjective   Subjective  No new complaints or acute overnight events.  Had a restful night.  Denies chest pain or shortness of breath.    Objective   Vital Signs  Temp:  [98.1 °F (36.7 °C)-99.2 °F (37.3 °C)] 99.2 °F (37.3 °C)  Heart Rate:  [73-98] 73  Resp:  [14-18] 18  BP: (128-170)/() 166/91  Body mass index is 23.07 kg/m².    Objective:  General Appearance:  Comfortable and in no acute distress.    Vital signs: (most recent): Blood pressure 166/91, pulse 73, temperature 99.2 °F (37.3 °C), temperature source Oral, resp. rate 18, height 172.7 cm (68\"), weight 68.8 kg (151 lb 11.2 oz), SpO2 93 %, not currently breastfeeding.    Lungs:  Normal effort and normal respiratory rate.  She is not in respiratory distress.  There are decreased breath sounds.    Heart: Normal rate.  Regular rhythm.    Abdomen: Abdomen is soft.  Bowel sounds are normal.   There is no abdominal tenderness.     Extremities: There is no dependent edema or local swelling.    Neurological: Patient is alert and oriented to person, place and time.    Skin:  Warm and dry.              Results Review:       I reviewed the patient's new clinical results.    Results from last 7 days   Lab Units 22  0622  0507 11/02/22  0526 11/01/22  0547 10/31/22  0524 10/30/22  1601   WBC 10*3/mm3 3.21* 3.46 3.51 3.44 3.79 4.15   HEMOGLOBIN g/dL 9.9* 10.1* 10.1* 9.8* 9.9* 10.1*   PLATELETS 10*3/mm3 306 313 334 326 378 372     Results from last 7 days   Lab Units 22  0622  0507 11/02/22  0526 11/01/22  0547 10/31/22  0524 10/30/22  1601   SODIUM mmol/L 140 139 140 139 140 136   POTASSIUM mmol/L 3.6 3.8 4.0 4.2 3.5 3.2*   CHLORIDE mmol/L 103 104 101 104 103 101   CO2 mmol/L 25.4 27.0 28.4 26.0 28.4 27.4   BUN mg/dL 14 11 10 11 8 10   CREATININE mg/dL 0.57 0.63 0.52* 0.52* " 0.66 0.73   CALCIUM mg/dL 8.8 8.6 8.7 8.8 8.6 8.9   GLUCOSE mg/dL 84 85 84 88 88 103*                 Scheduled Meds:   acetaminophen, 650 mg, Oral, Once  atorvastatin, 40 mg, Oral, Daily  buPROPion XL, 150 mg, Oral, Daily  diphenhydrAMINE, 25 mg, Oral, Once  fludrocortisone, 100 mcg, Oral, Daily  folic acid, 1,000 mcg, Oral, Daily  levothyroxine, 100 mcg, Oral, Daily  mirtazapine, 30 mg, Oral, Nightly  venlafaxine XR, 75 mg, Oral, Daily      PRN Meds:   •  acetaminophen  •  melatonin  •  potassium chloride  •  potassium chloride  •  sodium chloride  •  sodium chloride  Continuous Infusions:       Assessment & Plan   Active Hospital Problems    Diagnosis  POA   • **Near syncope [R55]  Yes   • Other neutropenia (HCC) [D70.8]  Yes   • Orthostatic hypotension [I95.1]  Yes   • Hypothyroidism (acquired) [E03.9]  Yes   • HTN (hypertension) [I10]  Yes   • Hypokalemia [E87.6]  Yes   • Symptomatic anemia [D64.9]  Yes      Resolved Hospital Problems    Diagnosis Date Resolved POA   • Hyponatremia [E87.1] 10/29/2022 Yes       Assessment & Plan    Presyncope/Orthostatic Hypotension  - she has received IVF and PRBCs without much change  - echocardiogram is okay  - continue compression stockings and abdominal binder  - continue fludrocortisone 100mcg daily  - Blood pressures have looked better and midodrine has been discontinued.  - appreciate neurology and cardiology evaluations- they have signed off     Macrocytic Anemia/neutropenia  - no nutritional deficiencies, negative peripheral blood flow cytometry  - s/p bone marrow biopsy 10/27/22  - appreciate hematology/oncology recs, follow up with Dr. Knox in 2-3 weeks     HTN  - home meds stopped due to orthostatic hypotension     Hypothyroidism  - continue levothyroxine     Depression  - controlled  - continue current regimen     HLD  - continue lipitor     SCDs for DVT prophylaxis.  Full code.  Discussed with patient, nursing staff and CCP.  Anticipate discharge to SNU  facility once precert has been obtained.    Plan has been reviewed by me today    Lazarus Morgan MD  [facility]  11/04/22  10:51 EDT

## 2022-11-04 NOTE — CASE MANAGEMENT/SOCIAL WORK
Continued Stay Note  [facility]     Patient Name: Rekha Campos  MRN: [id number]  Today's Date: 11/4/2022    Admit Date: 10/25/2022    Plan: [facility] SNF pending [employer] MCR, Family to transpor   Discharge Plan     Row Name 11/04/22 1258       Plan    Plan [facility] pending [employer] MCR, Family to transpor    Patient/Family in Agreement with Plan yes    Plan Comments Followed up with pt at bedside, discussed dc planning and awaiting pre-cert for [facility]. IMM given, pt denies any other questions. Family to transport. Packet in cubby. Abisai PEDERSEN/CCP               Discharge Codes    No documentation.               Expected Discharge Date and Time     Expected Discharge Date Expected Discharge Time    Nov 4, 2022             Dea Zavala RN

## 2022-11-05 LAB
ANION GAP SERPL CALCULATED.3IONS-SCNC: 7.9 MMOL/L (ref 5–15)
BASOPHILS # BLD AUTO: 0.03 10*3/MM3 (ref 0–0.2)
BASOPHILS NFR BLD AUTO: 1 % (ref 0–1.5)
BUN SERPL-MCNC: 11 MG/DL (ref 8–23)
BUN/CREAT SERPL: 20 (ref 7–25)
CALCIUM SPEC-SCNC: 8.6 MG/DL (ref 8.6–10.5)
CHLORIDE SERPL-SCNC: 104 MMOL/L (ref 98–107)
CO2 SERPL-SCNC: 29.1 MMOL/L (ref 22–29)
CREAT SERPL-MCNC: 0.55 MG/DL (ref 0.57–1)
DEPRECATED RDW RBC AUTO: 52.6 FL (ref 37–54)
EGFRCR SERPLBLD CKD-EPI 2021: 91.6 ML/MIN/1.73
EOSINOPHIL # BLD AUTO: 0.2 10*3/MM3 (ref 0–0.4)
EOSINOPHIL NFR BLD AUTO: 6.5 % (ref 0.3–6.2)
ERYTHROCYTE [DISTWIDTH] IN BLOOD BY AUTOMATED COUNT: 15.5 % (ref 12.3–15.4)
GLUCOSE SERPL-MCNC: 82 MG/DL (ref 65–99)
HCT VFR BLD AUTO: 29.7 % (ref 34–46.6)
HGB BLD-MCNC: 9.8 G/DL (ref 12–15.9)
IMM GRANULOCYTES # BLD AUTO: 0 10*3/MM3 (ref 0–0.05)
IMM GRANULOCYTES NFR BLD AUTO: 0 % (ref 0–0.5)
LYMPHOCYTES # BLD AUTO: 1.22 10*3/MM3 (ref 0.7–3.1)
LYMPHOCYTES NFR BLD AUTO: 39.5 % (ref 19.6–45.3)
MCH RBC QN AUTO: 31.5 PG (ref 26.6–33)
MCHC RBC AUTO-ENTMCNC: 33 G/DL (ref 31.5–35.7)
MCV RBC AUTO: 95.5 FL (ref 79–97)
MONOCYTES # BLD AUTO: 0.47 10*3/MM3 (ref 0.1–0.9)
MONOCYTES NFR BLD AUTO: 15.2 % (ref 5–12)
NEUTROPHILS NFR BLD AUTO: 1.17 10*3/MM3 (ref 1.7–7)
NEUTROPHILS NFR BLD AUTO: 37.8 % (ref 42.7–76)
NRBC BLD AUTO-RTO: 0 /100 WBC (ref 0–0.2)
PLATELET # BLD AUTO: 305 10*3/MM3 (ref 140–450)
PMV BLD AUTO: 9 FL (ref 6–12)
POTASSIUM SERPL-SCNC: 3.5 MMOL/L (ref 3.5–5.2)
RBC # BLD AUTO: 3.11 10*6/MM3 (ref 3.77–5.28)
SODIUM SERPL-SCNC: 141 MMOL/L (ref 136–145)
WBC NRBC COR # BLD: 3.09 10*3/MM3 (ref 3.4–10.8)

## 2022-11-05 PROCEDURE — 80048 BASIC METABOLIC PNL TOTAL CA: CPT | Performed by: INTERNAL MEDICINE

## 2022-11-05 PROCEDURE — 85025 COMPLETE CBC W/AUTO DIFF WBC: CPT | Performed by: INTERNAL MEDICINE

## 2022-11-05 RX ADMIN — Medication 5 MG: at 20:17

## 2022-11-05 RX ADMIN — FOLIC ACID 1000 MCG: 1 TABLET ORAL at 08:14

## 2022-11-05 RX ADMIN — VENLAFAXINE HYDROCHLORIDE 75 MG: 75 CAPSULE, EXTENDED RELEASE ORAL at 08:14

## 2022-11-05 RX ADMIN — LEVOTHYROXINE SODIUM 100 MCG: 0.1 TABLET ORAL at 08:14

## 2022-11-05 RX ADMIN — FLUDROCORTISONE ACETATE 100 MCG: 0.1 TABLET ORAL at 08:14

## 2022-11-05 RX ADMIN — ATORVASTATIN CALCIUM 40 MG: 20 TABLET, FILM COATED ORAL at 08:14

## 2022-11-05 RX ADMIN — BUPROPION HYDROCHLORIDE 150 MG: 150 TABLET, EXTENDED RELEASE ORAL at 08:14

## 2022-11-05 RX ADMIN — Medication 10 ML: at 08:15

## 2022-11-05 RX ADMIN — MIRTAZAPINE 30 MG: 30 TABLET, FILM COATED ORAL at 20:17

## 2022-11-05 NOTE — PLAN OF CARE
Goal Outcome Evaluation:   Pt. A&Ox4.  Appeared to sleep well throughout the night.  VSS.  Will CTM for the rest of my shift.

## 2022-11-05 NOTE — PROGRESS NOTES
" LOS: 9 days     Name: Rekha Campos  Age: 82 y.o.  Sex: female  :  1940  MRN: [id number]         Primary Care Physician: Roseanne Sanchez MD    Subjective   Subjective  No new complaints or acute overnight events.    Objective   Vital Signs  Temp:  [98.2 °F (36.8 °C)-99 °F (37.2 °C)] 98.2 °F (36.8 °C)  Heart Rate:  [64-72] 72  Resp:  [16-18] 16  BP: (137-165)/(85-95) 153/95  Body mass index is 23.07 kg/m².    Objective:  General Appearance:  Comfortable and in no acute distress.    Vital signs: (most recent): Blood pressure 153/95, pulse 72, temperature 98.2 °F (36.8 °C), temperature source Oral, resp. rate 16, height 172.7 cm (68\"), weight 68.8 kg (151 lb 11.2 oz), SpO2 93 %, not currently breastfeeding.    Lungs:  Normal effort and normal respiratory rate.    Heart: Normal rate.  Regular rhythm.    Abdomen: Abdomen is soft.  Bowel sounds are normal.   There is no abdominal tenderness.     Extremities: There is no dependent edema or local swelling.    Neurological: Patient is alert and oriented to person, place and time.    Skin:  Warm and dry.              Results Review:       I reviewed the patient's new clinical results.    Results from last 7 days   Lab Units 22  0645 22  0613 11/03/22  0507 11/02/22  0526 11/01/22  0547 10/31/22  0524 10/30/22  1601   WBC 10*3/mm3 3.09* 3.21* 3.46 3.51 3.44 3.79 4.15   HEMOGLOBIN g/dL 9.8* 9.9* 10.1* 10.1* 9.8* 9.9* 10.1*   PLATELETS 10*3/mm3 305 306 313 334 326 378 372     Results from last 7 days   Lab Units 22  0645 22  0613 22  0507 22  0526 22  0547 10/31/22  0524 10/30/22  1601   SODIUM mmol/L 141 140 139 140 139 140 136   POTASSIUM mmol/L 3.5 3.6 3.8 4.0 4.2 3.5 3.2*   CHLORIDE mmol/L 104 103 104 101 104 103 101   CO2 mmol/L 29.1* 25.4 27.0 28.4 26.0 28.4 27.4   BUN mg/dL 11 14 11 10 11 8 10   CREATININE mg/dL 0.55* 0.57 0.63 0.52* 0.52* 0.66 0.73   CALCIUM mg/dL 8.6 8.8 8.6 8.7 8.8 8.6 8.9   GLUCOSE mg/dL " 82 84 85 84 88 88 103*                 Scheduled Meds:   acetaminophen, 650 mg, Oral, Once  atorvastatin, 40 mg, Oral, Daily  buPROPion XL, 150 mg, Oral, Daily  diphenhydrAMINE, 25 mg, Oral, Once  fludrocortisone, 100 mcg, Oral, Daily  folic acid, 1,000 mcg, Oral, Daily  levothyroxine, 100 mcg, Oral, Daily  mirtazapine, 30 mg, Oral, Nightly  venlafaxine XR, 75 mg, Oral, Daily      PRN Meds:   •  acetaminophen  •  melatonin  •  potassium chloride  •  potassium chloride  •  sodium chloride  •  sodium chloride  Continuous Infusions:       Assessment & Plan   Active Hospital Problems    Diagnosis  POA   • **Near syncope [R55]  Yes   • Other neutropenia (HCC) [D70.8]  Yes   • Orthostatic hypotension [I95.1]  Yes   • Hypothyroidism (acquired) [E03.9]  Yes   • HTN (hypertension) [I10]  Yes   • Hypokalemia [E87.6]  Yes   • Symptomatic anemia [D64.9]  Yes      Resolved Hospital Problems    Diagnosis Date Resolved POA   • Hyponatremia [E87.1] 10/29/2022 Yes       Assessment & Plan    Presyncope/Orthostatic Hypotension  - she has received IVF and PRBCs without much change  - echocardiogram is okay  - continue compression stockings and abdominal binder  - continue fludrocortisone 100mcg daily  - Blood pressures have looked better and midodrine has been discontinued.  - appreciate neurology and cardiology evaluations- they have signed off     Macrocytic Anemia/neutropenia  - no nutritional deficiencies, negative peripheral blood flow cytometry  - s/p bone marrow biopsy 10/27/22  - appreciate hematology/oncology recs, follow up with Dr. Knox in 2-3 weeks     HTN  - home meds stopped due to orthostatic hypotension     Hypothyroidism  - continue levothyroxine     Depression  - controlled  - continue current regimen     HLD  - continue lipitor     SCDs for DVT prophylaxis.  Full code.  Discussed with patient, nursing staff and CCP.  Anticipate discharge to SNU facility once precert has been obtained.     Plan has been reviewed by  me today      Lazarus Morgan MD  [facility]  11/05/22  09:54 EDT

## 2022-11-06 LAB
ANION GAP SERPL CALCULATED.3IONS-SCNC: 7 MMOL/L (ref 5–15)
BASOPHILS # BLD AUTO: 0.03 10*3/MM3 (ref 0–0.2)
BASOPHILS NFR BLD AUTO: 1 % (ref 0–1.5)
BUN SERPL-MCNC: 13 MG/DL (ref 8–23)
BUN/CREAT SERPL: 23.6 (ref 7–25)
CALCIUM SPEC-SCNC: 8.8 MG/DL (ref 8.6–10.5)
CHLORIDE SERPL-SCNC: 103 MMOL/L (ref 98–107)
CO2 SERPL-SCNC: 30 MMOL/L (ref 22–29)
CREAT SERPL-MCNC: 0.55 MG/DL (ref 0.57–1)
DEPRECATED RDW RBC AUTO: 53.6 FL (ref 37–54)
EGFRCR SERPLBLD CKD-EPI 2021: 91.6 ML/MIN/1.73
EOSINOPHIL # BLD AUTO: 0.17 10*3/MM3 (ref 0–0.4)
EOSINOPHIL NFR BLD AUTO: 5.9 % (ref 0.3–6.2)
ERYTHROCYTE [DISTWIDTH] IN BLOOD BY AUTOMATED COUNT: 15.5 % (ref 12.3–15.4)
GLUCOSE SERPL-MCNC: 78 MG/DL (ref 65–99)
HCT VFR BLD AUTO: 30 % (ref 34–46.6)
HGB BLD-MCNC: 9.7 G/DL (ref 12–15.9)
IMM GRANULOCYTES # BLD AUTO: 0 10*3/MM3 (ref 0–0.05)
IMM GRANULOCYTES NFR BLD AUTO: 0 % (ref 0–0.5)
LYMPHOCYTES # BLD AUTO: 1.16 10*3/MM3 (ref 0.7–3.1)
LYMPHOCYTES NFR BLD AUTO: 40.3 % (ref 19.6–45.3)
MCH RBC QN AUTO: 30.9 PG (ref 26.6–33)
MCHC RBC AUTO-ENTMCNC: 32.3 G/DL (ref 31.5–35.7)
MCV RBC AUTO: 95.5 FL (ref 79–97)
MONOCYTES # BLD AUTO: 0.42 10*3/MM3 (ref 0.1–0.9)
MONOCYTES NFR BLD AUTO: 14.6 % (ref 5–12)
NEUTROPHILS NFR BLD AUTO: 1.1 10*3/MM3 (ref 1.7–7)
NEUTROPHILS NFR BLD AUTO: 38.2 % (ref 42.7–76)
NRBC BLD AUTO-RTO: 0 /100 WBC (ref 0–0.2)
PLATELET # BLD AUTO: 309 10*3/MM3 (ref 140–450)
PMV BLD AUTO: 9.2 FL (ref 6–12)
POTASSIUM SERPL-SCNC: 3.5 MMOL/L (ref 3.5–5.2)
RBC # BLD AUTO: 3.14 10*6/MM3 (ref 3.77–5.28)
SODIUM SERPL-SCNC: 140 MMOL/L (ref 136–145)
WBC NRBC COR # BLD: 2.88 10*3/MM3 (ref 3.4–10.8)

## 2022-11-06 PROCEDURE — 85025 COMPLETE CBC W/AUTO DIFF WBC: CPT | Performed by: INTERNAL MEDICINE

## 2022-11-06 PROCEDURE — 97110 THERAPEUTIC EXERCISES: CPT | Performed by: PHYSICAL THERAPIST

## 2022-11-06 PROCEDURE — 80048 BASIC METABOLIC PNL TOTAL CA: CPT | Performed by: INTERNAL MEDICINE

## 2022-11-06 RX ADMIN — FOLIC ACID 1000 MCG: 1 TABLET ORAL at 09:25

## 2022-11-06 RX ADMIN — VENLAFAXINE HYDROCHLORIDE 75 MG: 75 CAPSULE, EXTENDED RELEASE ORAL at 09:25

## 2022-11-06 RX ADMIN — Medication 5 MG: at 21:27

## 2022-11-06 RX ADMIN — BUPROPION HYDROCHLORIDE 150 MG: 150 TABLET, EXTENDED RELEASE ORAL at 09:25

## 2022-11-06 RX ADMIN — FLUDROCORTISONE ACETATE 100 MCG: 0.1 TABLET ORAL at 09:25

## 2022-11-06 RX ADMIN — LEVOTHYROXINE SODIUM 100 MCG: 0.1 TABLET ORAL at 09:25

## 2022-11-06 RX ADMIN — MIRTAZAPINE 30 MG: 30 TABLET, FILM COATED ORAL at 21:27

## 2022-11-06 RX ADMIN — ATORVASTATIN CALCIUM 40 MG: 20 TABLET, FILM COATED ORAL at 09:24

## 2022-11-06 NOTE — THERAPY TREATMENT NOTE
Patient Name: Rekha Campos  : 1940    MRN: [id number]                              Today's Date: 2022       Admit Date: 10/25/2022    Visit Dx:     ICD-10-CM ICD-9-CM   1. Near syncope  R55 780.2   2. Anemia, unspecified type  D64.9 285.9   3. Generalized weakness  R53.1 780.79   4. Precipitous drop in hematocrit   R71.0 790.01   5. Pancytopenia (HCC)  D61.818 284.19   6. Malignant neoplasm of areola of right breast in female, unspecified estrogen receptor status (HCC)  C50.011 174.0   7. Orthostatic hypotension  I95.1 458.0     Patient Active Problem List   Diagnosis   • Malignant neoplasm of areola of right breast in female (HCC)   • Osteoporosis   • Osteopenia of multiple sites   • Symptomatic anemia   • Near syncope   • Hypothyroidism (acquired)   • HTN (hypertension)   • Hypokalemia   • Orthostatic hypotension   • Other neutropenia (HCC)     Past Medical History:   Diagnosis Date   • Anxiety    • Cancer (HCC)     Stage I, right breast cancer   • Depression    • Disease of thyroid gland     hypothyroidusm   • Erythrocytosis    • Hypercholesterolemia    • Hypertension    • Osteoporosis     osteopenia mild     Past Surgical History:   Procedure Laterality Date   • BREAST LUMPECTOMY Right    • COLONOSCOPY     • EYE SURGERY  2004    corneal transplant   • HYSTERECTOMY     • SHOULDER SURGERY        General Information     Row Name 22 9786          Physical Therapy Time and Intention    Document Type therapy note (daily note)  -AALIYAH     Mode of Treatment physical therapy  -AALIYAH           User Key  (r) = Recorded By, (t) = Taken By, (c) = Cosigned By    Initials Name Provider Type    Dea Pena, PT Physical Therapist               Mobility     Row Name 22 7093          Bed Mobility    Bed Mobility supine-sit;sit-supine  -AALIYAH     Supine-Sit [place] (Bed Mobility) standby assist  -AALIYAH     Sit-Supine [place] (Bed Mobility) standby assist  -AALIYAH     Assistive Device (Bed  Mobility) head of bed elevated;bed rails  -     Row Name 11/06/22 1307          Sit-Stand Transfer    Sit-Stand [place] (Transfers) contact guard;verbal cues  -     Assistive Device (Sit-Stand Transfers) walker, front-wheeled  -     Row Name 11/06/22 1307          Gait/Stairs (Locomotion)    [place] Level (Gait) contact guard  -     Assistive Device (Gait) walker, front-wheeled  -     Distance in Feet (Gait) 30 ft x 2  -     Deviations/Abnormal Patterns (Gait) ro decreased;stride length decreased  -     Bilateral Gait Deviations forward flexed posture  -     Comment, (Gait/Stairs) limited by pain in feet and ankles  -           User Key  (r) = Recorded By, (t) = Taken By, (c) = Cosigned By    Initials Name Provider Type    Dea Pena, STONEY Physical Therapist               Obj/Interventions     Row Name 11/06/22 1324          Motor Skills    Therapeutic Exercise --  BLE AP, LAQ, seated marching x 10 reps  -           User Key  (r) = Recorded By, (t) = Taken By, (c) = Cosigned By    Initials Name Provider Type    Dea Pena, PT Physical Therapist               Goals/Plan    No documentation.                Clinical Impression     Row Name 11/06/22 1325          Pain    Pretreatment Pain Rating 4/10  -     Posttreatment Pain Rating 4/10  -     Pain Location - Side/Orientation Bilateral  -     Pain Location - foot;ankle  -[facility] Name 11/06/22 1325          Plan of Care Review    Outcome Evaluation Pt was agreeable to therapy. She ambulated well, but further distance was limited by pain in B ankles and feet. Pt fatigues quickly.  -     Row Name 11/06/22 1325          Positioning and Restraints    Pre-Treatment Position in bed  -     Post Treatment Position bed  -     In Bed fowlers;call light within reach;encouraged to call for assist;exit alarm on  -           User Key  (r) = Recorded By, (t) = Taken By, (c) = Cosigned By    Initials Name  Provider Type    Dea Pena, PT Physical Therapist               Outcome Measures     Row Name 11/06/22 1328          How much help from another person do you currently need...    Turning from your back to your side while in flat bed without using bedrails? 4  -KH     Moving from lying on back to sitting on the side of a flat bed without bedrails? 4  -KH     Moving to and from a bed to a chair (including a wheelchair)? 3  -KH     Standing up from a chair using your arms (e.g., wheelchair, bedside chair)? 3  -KH     Climbing 3-5 steps with a railing? 2  -KH     To walk in hospital room? 3  -KH     AM-PAC 6 Clicks Score (PT) 19  -KH     Highest level of mobility 6 --> Walked 10 steps or more  -AALIYAH     Row Name 11/06/22 1328          Functional Assessment    Outcome Measure Options AM-PAC 6 Clicks Basic Mobility (PT)  -           User Key  (r) = Recorded By, (t) = Taken By, (c) = Cosigned By    Initials Name Provider Type    Dea Pena, PT Physical Therapist                             Physical Therapy Education     Title: PT OT SLP Therapies (Done)     Topic: Physical Therapy (Done)     Point: Mobility training (Done)     Learning Progress Summary           Patient Acceptance, E, VU,NR by AALIYAH at 11/6/2022 1329    Acceptance, E,TB,D, VU,NR by  at 11/2/2022 1634    Acceptance, E,TB,D, VU by  at 10/31/2022 1159    Acceptance, E, VU,NR by  at 10/29/2022 1447    Acceptance, E,TB,D, VU,NR by  at 10/28/2022 1319    Acceptance, E, NR by  at 10/26/2022 1149                   Point: Home exercise program (Done)     Learning Progress Summary           Patient Acceptance, E, VU,NR by AALIYAH at 11/6/2022 1329    Acceptance, E,TB,D, VU,NR by  at 10/28/2022 1319    Acceptance, E, NR by  at 10/26/2022 1149                   Point: Body mechanics (Done)     Learning Progress Summary           Patient Acceptance, E, VU,NR by  at 11/6/2022 1329    Acceptance, E,TB,D, VU,NR by  at 10/28/2022  1319    Acceptance, E, NR by  at 10/26/2022 1149                   Point: Precautions (Done)     Learning Progress Summary           Patient Acceptance, E, VU,NR by  at 11/6/2022 1329    Acceptance, E, VU,NR by  at 10/29/2022 1447    Acceptance, E,TB,D, VU,NR by  at 10/28/2022 1319    Acceptance, E, NR by  at 10/26/2022 1149                               User Key     Initials Effective Dates Name Provider Type Discipline     06/16/21 -  Dea Valderrama, PT Physical Therapist PT     06/16/21 -  Mony Au, PT Physical Therapist PT     06/16/21 -  Esther Thacker, PT Physical Therapist PT     06/16/21 -  Mary Jane Stack, PT Physical Therapist PT     06/16/21 -  Jacklyn De La Rosa, PT Physical Therapist PT              PT Recommendation and Plan     Outcome Evaluation: Pt was agreeable to therapy. She ambulated well, but further distance was limited by pain in B ankles and feet. Pt fatigues quickly.     Time Calculation:    PT Charges     Row Name 11/06/22 1329             Time Calculation    Start Time 1012  -      Stop Time 1022  -      Time Calculation (min) 10 min  -      PT Received On 11/06/22  -      PT - Next Appointment 11/07/22  -         Time Calculation- PT    Total Timed Code Minutes- PT 10 minute(s)  -         Timed Charges    53616 - PT Therapeutic Exercise Minutes 10  -KH         Total Minutes    Timed Charges Total Minutes 10  -KH       Total Minutes 10  -KH            User Key  (r) = Recorded By, (t) = Taken By, (c) = Cosigned By    Initials Name Provider Type     Dea Valderrama, PT Physical Therapist              Therapy Charges for Today     Code Description Service Date Service Provider Modifiers Qty    40259489385 HC PT THER PROC EA 15 MIN 11/6/2022 Dea Valderrama, PT GP 1          PT G-Codes  Outcome Measure Options: AM-PAC 6 Clicks Basic Mobility (PT)  AM-PAC 6 Clicks Score (PT): 19    Dea Valderrama, PT  11/6/2022

## 2022-11-06 NOTE — PROGRESS NOTES
" LOS: 10 days     Name: Rekha Campos  Age: 82 y.o.  Sex: female  :  1940  MRN: [id number]         Primary Care Physician: Roseanne Sanchez MD    Subjective   Subjective  No new complaints or acute overnight events.  \"I am still here\"    Objective   Vital Signs  Temp:  [98.3 °F (36.8 °C)-98.7 °F (37.1 °C)] 98.5 °F (36.9 °C)  Heart Rate:  [68-76] 73  Resp:  [16-18] 16  BP: (149-175)/(89-98) 175/95  Body mass index is 23.07 kg/m².    Objective:  General Appearance:  Comfortable and in no acute distress.    Vital signs: (most recent): Blood pressure 175/95, pulse 73, temperature 98.5 °F (36.9 °C), temperature source Oral, resp. rate 16, height 172.7 cm (68\"), weight 68.8 kg (151 lb 11.2 oz), SpO2 91 %, not currently breastfeeding.    Lungs:  Normal effort and normal respiratory rate.    Heart: Normal rate.  Regular rhythm.    Abdomen: Abdomen is soft.  Bowel sounds are normal.   There is no abdominal tenderness.     Extremities: There is no dependent edema or local swelling.    Neurological: Patient is alert and oriented to person, place and time.    Skin:  Warm and dry.              Results Review:       I reviewed the patient's new clinical results.    Results from last 7 days   Lab Units 22  0611 22  0645 2213 22  0547 10/31/22  0524   WBC 10*3/mm3 2.88* 3.09* 3.21* 3.46 3.51 3.44 3.79   HEMOGLOBIN g/dL 9.7* 9.8* 9.9* 10.1* 10.1* 9.8* 9.9*   PLATELETS 10*3/mm3 309 305 306 313 334 326 378     Results from last 7 days   Lab Units 22  0611 22  0645 22  0613 22  0507 22  0522  0547 10/31/22  0524   SODIUM mmol/L 140 141 140 139 140 139 140   POTASSIUM mmol/L 3.5 3.5 3.6 3.8 4.0 4.2 3.5   CHLORIDE mmol/L 103 104 103 104 101 104 103   CO2 mmol/L 30.0* 29.1* 25.4 27.0 28.4 26.0 28.4   BUN mg/dL 13 11 14 11 10 11 8   CREATININE mg/dL 0.55* 0.55* 0.57 0.63 0.52* 0.52* 0.66   CALCIUM mg/dL 8.8 8.6 8.8 8.6 8.7 " 8.8 8.6   GLUCOSE mg/dL 78 82 84 85 84 88 88                 Scheduled Meds:   acetaminophen, 650 mg, Oral, Once  atorvastatin, 40 mg, Oral, Daily  buPROPion XL, 150 mg, Oral, Daily  diphenhydrAMINE, 25 mg, Oral, Once  fludrocortisone, 100 mcg, Oral, Daily  folic acid, 1,000 mcg, Oral, Daily  levothyroxine, 100 mcg, Oral, Daily  mirtazapine, 30 mg, Oral, Nightly  venlafaxine XR, 75 mg, Oral, Daily      PRN Meds:   •  acetaminophen  •  melatonin  •  potassium chloride  •  potassium chloride  •  sodium chloride  •  sodium chloride  Continuous Infusions:       Assessment & Plan   Active Hospital Problems    Diagnosis  POA   • **Near syncope [R55]  Yes   • Other neutropenia (HCC) [D70.8]  Yes   • Orthostatic hypotension [I95.1]  Yes   • Hypothyroidism (acquired) [E03.9]  Yes   • HTN (hypertension) [I10]  Yes   • Hypokalemia [E87.6]  Yes   • Symptomatic anemia [D64.9]  Yes      Resolved Hospital Problems    Diagnosis Date Resolved POA   • Hyponatremia [E87.1] 10/29/2022 Yes       Assessment & Plan    Presyncope/Orthostatic Hypotension  - she has received IVF and PRBCs without much change  - echocardiogram is okay  - continue compression stockings and abdominal binder  - continue fludrocortisone 100mcg daily  - Blood pressures have looked better and midodrine has been discontinued.  - appreciate neurology and cardiology evaluations- they have signed off     Macrocytic Anemia/neutropenia  - no nutritional deficiencies, negative peripheral blood flow cytometry  - s/p bone marrow biopsy 10/27/22  - appreciate hematology/oncology recs, follow up with Dr. Knox in 2-3 weeks     HTN  - home meds stopped due to orthostatic hypotension     Hypothyroidism  - continue levothyroxine     Depression  - controlled  - continue current regimen     HLD  - continue lipitor     SCDs for DVT prophylaxis.  Full code.  Discussed with patient, nursing staff and CCP.  Anticipate discharge to SNU facility once precert has been obtained.     Plan  has been reviewed by me today      Lazarus Morgan MD  [facility]  11/06/22  09:32 EST

## 2022-11-06 NOTE — PLAN OF CARE
Goal Outcome Evaluation:     Pt. A&Ox4.  No syncopal episode noted.  Appeared to sleep well throughout the shift.  VSS.  Will CTM for the rest of my shift.

## 2022-11-07 PROCEDURE — 97530 THERAPEUTIC ACTIVITIES: CPT

## 2022-11-07 RX ADMIN — Medication 5 MG: at 20:32

## 2022-11-07 RX ADMIN — FLUDROCORTISONE ACETATE 100 MCG: 0.1 TABLET ORAL at 08:30

## 2022-11-07 RX ADMIN — BUPROPION HYDROCHLORIDE 150 MG: 150 TABLET, EXTENDED RELEASE ORAL at 08:30

## 2022-11-07 RX ADMIN — VENLAFAXINE HYDROCHLORIDE 75 MG: 75 CAPSULE, EXTENDED RELEASE ORAL at 08:30

## 2022-11-07 RX ADMIN — LEVOTHYROXINE SODIUM 100 MCG: 0.1 TABLET ORAL at 06:37

## 2022-11-07 RX ADMIN — MIRTAZAPINE 30 MG: 30 TABLET, FILM COATED ORAL at 20:32

## 2022-11-07 RX ADMIN — ATORVASTATIN CALCIUM 40 MG: 20 TABLET, FILM COATED ORAL at 08:30

## 2022-11-07 RX ADMIN — FOLIC ACID 1000 MCG: 1 TABLET ORAL at 08:30

## 2022-11-07 NOTE — THERAPY TREATMENT NOTE
Patient Name: Rekha Campos  : 1940    MRN: [id number]                              Today's Date: 2022       Admit Date: 10/25/2022    Visit Dx:     ICD-10-CM ICD-9-CM   1. Near syncope  R55 780.2   2. Anemia, unspecified type  D64.9 285.9   3. Generalized weakness  R53.1 780.79   4. Precipitous drop in hematocrit   R71.0 790.01   5. Pancytopenia (HCC)  D61.818 284.19   6. Malignant neoplasm of areola of right breast in female, unspecified estrogen receptor status (HCC)  C50.011 174.0   7. Orthostatic hypotension  I95.1 458.0     Patient Active Problem List   Diagnosis   • Malignant neoplasm of areola of right breast in female (HCC)   • Osteoporosis   • Osteopenia of multiple sites   • Symptomatic anemia   • Near syncope   • Hypothyroidism (acquired)   • HTN (hypertension)   • Hypokalemia   • Orthostatic hypotension   • Other neutropenia (HCC)     Past Medical History:   Diagnosis Date   • Anxiety    • Cancer (HCC)     Stage I, right breast cancer   • Depression    • Disease of thyroid gland     hypothyroidusm   • Erythrocytosis    • Hypercholesterolemia    • Hypertension    • Osteoporosis     osteopenia mild     Past Surgical History:   Procedure Laterality Date   • BREAST LUMPECTOMY Right    • COLONOSCOPY     • EYE SURGERY  2004    corneal transplant   • HYSTERECTOMY     • SHOULDER SURGERY        General Information     Row Name 22 1536          Physical Therapy Time and Intention    Document Type therapy note (daily note)  -PH     Mode of Treatment physical therapy  -PH     Row Name 22 1536          General Information    Existing Precautions/Restrictions orthostatic hypotension;fall  -PH     Row Name 22 1536          Cognition    Orientation Status (Cognition) oriented x 4  -PH     Row Name 22 1536          Safety Issues, Functional Mobility    Impairments Affecting Function (Mobility) balance;endurance/activity tolerance  -PH     Comment, Safety Issues/Impairments  (Mobility) gt belt and non skid socks donned;  -PH           User Key  (r) = Recorded By, (t) = Taken By, (c) = Cosigned By    Initials Name Provider Type     Apolonia Lomeli PTA Physical Therapist Assistant               Mobility     Row Name 11/07/22 1537          Bed Mobility    Bed Mobility supine-sit;sit-supine  -PH     Supine-Sit [place] (Bed Mobility) standby assist  -PH     Sit-Supine [place] (Bed Mobility) standby assist  -PH     Assistive Device (Bed Mobility) bed rails;head of bed elevated  -PH     Row Name 11/07/22 1537          Sit-Stand Transfer    Sit-Stand [place] (Transfers) contact guard;verbal cues  -PH     Assistive Device (Sit-Stand Transfers) walker, front-wheeled  -PH     Comment, (Sit-Stand Transfer) 2x from EOB  -PH     Row Name 11/07/22 1537          Gait/Stairs (Locomotion)    [place] Level (Gait) contact guard;minimum assist (75% patient effort)  -PH     Assistive Device (Gait) walker, front-wheeled  -PH     Deviations/Abnormal Patterns (Gait) ro decreased;stride length decreased  -PH     [place] Level (Stairs) unable to assess  -PH     Comment, (Gait/Stairs) Pt reporting pain in B feet when asked; pt states she believes pain is from compression socks she is wearing; incr in unsteadiness w/ 1  LOB upon return to room w/ pt reporting dizziness when asked; vc / tc to remain w/ fww until B LE touch EOB  -PH           User Key  (r) = Recorded By, (t) = Taken By, (c) = Cosigned By    Initials Name Provider Type     Apolonia Lomeli PTA Physical Therapist Assistant               Obj/Interventions     Row Name 11/07/22 1540          Motor Skills    Therapeutic Exercise other (see comments)  BAP, LAQ, seated march, shldr flexion, punches; x 10 reps all  -PH     Row Name 11/07/22 1540          Balance    Balance Assessment sitting static balance;standing static balance  -PH     Static Sitting Balance independent  -PH     Static Standing Balance  contact guard  -PH     Position/Device Used, Standing Balance walker, front-wheeled  -PH           User Key  (r) = Recorded By, (t) = Taken By, (c) = Cosigned By    Initials Name Provider Type    Apolonia Louis PTA Physical Therapist Assistant               Goals/Plan    No documentation.                Clinical Impression     Row Name 11/07/22 1541          Pain    Pretreatment Pain Rating 0/10 - no pain  -PH     Posttreatment Pain Rating 0/10 - no pain  -PH     Additional Documentation Pain Scale: Numbers Pre/Post-Treatment (Group)  -PH     Row Name 11/07/22 1541          Plan of Care Review    Plan of Care Reviewed With patient  -PH     Progress improving  -PH     Outcome Evaluation Pt in bed at beg of PT session. Pt sat up to EOB w/ SBA. Pt then stood up w/ CGA and use of fww and denied dizziness when asked. Pt amb approx 60' req CGA to min A and use of fww. Incr unsteadiness noted w/ distance. Pt reporting dizziness when asked when she returned to room w/ 1 mild LOB as pt neared bed. Pt's BP taken when pt seated - 148/91. Pt then stood again w/ /70. RN notified. Pt performed B LE ther ex for strengthening. Pt then returned to bed w/ SBA. PT will prog as pt lenny.  -PH     Row Name 11/07/22 1541          Positioning and Restraints    Pre-Treatment Position in bed  -PH     Post Treatment Position bed  -PH     In Bed fowlers;call light within reach;encouraged to call for assist;exit alarm on  -PH           User Key  (r) = Recorded By, (t) = Taken By, (c) = Cosigned By    Initials Name Provider Type    Apolonia Louis PTA Physical Therapist Assistant               Outcome Measures     Row Name 11/07/22 1206          How much help from another person do you currently need...    Turning from your back to your side while in flat bed without using bedrails? 4  -PH     Moving from lying on back to sitting on the side of a flat bed without bedrails? 4  -PH     Moving to and from a bed to a  chair (including a wheelchair)? 3  -PH     Standing up from a chair using your arms (e.g., wheelchair, bedside chair)? 3  -PH     Climbing 3-5 steps with a railing? 2  -PH     To walk in hospital room? 3  -PH     AM-PAC 6 Clicks Score (PT) 19  -PH     Highest level of mobility 6 --> Walked 10 steps or more  -PH     Row Name 11/07/22 1546          Functional Assessment    Outcome Measure Options AM-PAC 6 Clicks Basic Mobility (PT)  -PH           User Key  (r) = Recorded By, (t) = Taken By, (c) = Cosigned By    Initials Name Provider Type    Apolonia Louis, PTA Physical Therapist Assistant                             Physical Therapy Education     Title: PT OT SLP Therapies (Done)     Topic: Physical Therapy (Done)     Point: Mobility training (Done)     Learning Progress Summary           Patient Acceptance, E,D, VU by  at 11/7/2022 1546    Acceptance, E, VU,NR by  at 11/6/2022 1329    Acceptance, E,TB,D, VU,NR by  at 11/2/2022 1634    Acceptance, E,TB,D, VU by  at 10/31/2022 1159    Acceptance, E, VU,NR by  at 10/29/2022 1447    Acceptance, E,TB,D, VU,NR by  at 10/28/2022 1319    Acceptance, E, NR by  at 10/26/2022 1149                   Point: Home exercise program (Done)     Learning Progress Summary           Patient Acceptance, E,D, VU by  at 11/7/2022 1546    Acceptance, E, VU,NR by  at 11/6/2022 1329    Acceptance, E,TB,D, VU,NR by  at 10/28/2022 1319    Acceptance, E, NR by  at 10/26/2022 1149                   Point: Body mechanics (Done)     Learning Progress Summary           Patient Acceptance, E,D, VU by  at 11/7/2022 1546    Acceptance, E, VU,NR by  at 11/6/2022 1329    Acceptance, E,TB,D, VU,NR by  at 10/28/2022 1319    Acceptance, E, NR by  at 10/26/2022 1149                   Point: Precautions (Done)     Learning Progress Summary           Patient Acceptance, E,D, VU by  at 11/7/2022 1546    Acceptance, E, VU,NR by  at 11/6/2022 1329    Acceptance, E,  VU,NR by  at 10/29/2022 1447    Acceptance, E,TB,D, VU,NR by  at 10/28/2022 1319    Acceptance, E, NR by  at 10/26/2022 1149                               User Key     Initials Effective Dates Name Provider Type Discipline     06/16/21 -  Dea Valderrama, PT Physical Therapist PT     06/16/21 -  Mony Au, PT Physical Therapist PT     06/16/21 -  Esther Thacker, PT Physical Therapist PT     06/16/21 -  Mary Jane Stack, PT Physical Therapist PT     06/16/21 -  Apolonia Lomeli PTA Physical Therapist Assistant PT    CF 06/16/21 -  Jacklyn De La Rosa, PT Physical Therapist PT              PT Recommendation and Plan     Plan of Care Reviewed With: patient  Progress: improving  Outcome Evaluation: Pt in bed at beg of PT session. Pt sat up to EOB w/ SBA. Pt then stood up w/ CGA and use of fww and denied dizziness when asked. Pt amb approx 60' req CGA to min A and use of fww. Incr unsteadiness noted w/ distance. Pt reporting dizziness when asked when she returned to room w/ 1 mild LOB as pt neared bed. Pt's BP taken when pt seated - 148/91. Pt then stood again w/ /70. RN notified. Pt performed B LE ther ex for strengthening. Pt then returned to bed w/ SBA. PT will prog as pt lenny.     Time Calculation:    PT Charges     Row Name 11/07/22 1547             Time Calculation    Start Time 1520  -PH      Stop Time 1537  -PH      Time Calculation (min) 17 min  -PH      PT Received On 11/07/22  -PH      PT - Next Appointment 11/08/22  -PH         Timed Charges    52350 - PT Therapeutic Exercise Minutes 7  -PH      75634 - PT Therapeutic Activity Minutes 10  -PH         Total Minutes    Timed Charges Total Minutes 17  -PH       Total Minutes 17  -PH            User Key  (r) = Recorded By, (t) = Taken By, (c) = Cosigned By    Initials Name Provider Type    PH Apolonia Lomeli, PTA Physical Therapist Assistant              Therapy Charges for Today     Code Description Service Date  Service Provider Modifiers Qty    [id number]  PT THERAPEUTIC ACT EA 15 MIN 11/7/2022 Apolonia Lomeli, PTA GP 1          PT G-Codes  Outcome Measure Options: AM-PAC 6 Clicks Basic Mobility (PT)  AM-PAC 6 Clicks Score (PT): 19    Apolonia Lomeli, MIRZA  11/7/2022

## 2022-11-07 NOTE — PROGRESS NOTES
"DAILY PROGRESS NOTE  [facility]    Patient Identification:  Name: Rekha Campos  Age: 82 y.o.  Sex: female  :  1940  MRN: [id number]         Primary Care Physician: Roseanne Sanchez MD    Subjective:  Interval History:No new complaints.    Objective:    Scheduled Meds:acetaminophen, 650 mg, Oral, Once  atorvastatin, 40 mg, Oral, Daily  buPROPion XL, 150 mg, Oral, Daily  diphenhydrAMINE, 25 mg, Oral, Once  fludrocortisone, 100 mcg, Oral, Daily  folic acid, 1,000 mcg, Oral, Daily  levothyroxine, 100 mcg, Oral, Daily  mirtazapine, 30 mg, Oral, Nightly  venlafaxine XR, 75 mg, Oral, Daily      Continuous Infusions:     Vital signs in last 24 hours:  Temp:  [98 °F (36.7 °C)-98.9 °F (37.2 °C)] 98.4 °F (36.9 °C)  Heart Rate:  [66-74] 71  Resp:  [16-18] 18  BP: ()/(72-98) 165/83    Intake/Output:    Intake/Output Summary (Last 24 hours) at 2022 1409  Last data filed at 2022 0500  Gross per 24 hour   Intake --   Output 3200 ml   Net -3200 ml       Exam:  /83 (BP Location: Right arm, Patient Position: Lying)   Pulse 71   Temp 98.4 °F (36.9 °C) (Oral)   Resp 18   Ht 172.7 cm (68\")   Wt 68.8 kg (151 lb 11.2 oz)   SpO2 91%   BMI 23.07 kg/m²     General Appearance:    Alert, cooperative, no distress   Head:    Normocephalic, without obvious abnormality, atraumatic   Eyes:       Throat:   Lips, tongue, gums normal   Neck:   Supple, symmetrical, trachea midline, no JVD   Lungs:     Clear to auscultation bilaterally, respirations unlabored   Chest Wall:    No tenderness or deformity    Heart:    Regular rate and rhythm, S1 and S2 normal, no murmur,no  Rub or gallop   Abdomen:     Soft, nontender, bowel sounds active, no masses, no organomegaly    Extremities:   Extremities normal, atraumatic, no cyanosis or edema   Pulses:      Skin:   Skin is warm and dry,  no rashes or palpable lesions   Neurologic:   no focal deficits noted      Lab Results (last 72 hours)     " Procedure Component Value Units Date/Time    Basic Metabolic Panel [[id number]]  (Abnormal) Collected: 11/06/22 0611    Specimen: Blood Updated: 11/06/22 0714     Glucose 78 mg/dL      BUN 13 mg/dL      Creatinine 0.55 mg/dL      Sodium 140 mmol/L      Potassium 3.5 mmol/L      Chloride 103 mmol/L      CO2 30.0 mmol/L      Calcium 8.8 mg/dL      BUN/Creatinine Ratio 23.6     Anion Gap 7.0 mmol/L      eGFR 91.6 mL/min/1.73      Comment: National Kidney Foundation and American Society of Nephrology (ASN) Task Force recommended calculation based on the Chronic Kidney Disease Epidemiology Collaboration (CKD-EPI) equation refit without adjustment for race.       Narrative:      GFR Normal >60  Chronic Kidney Disease <60  Kidney Failure <15    The GFR formula is only valid for adults with stable renal function between ages 18 and 70.    CBC & Differential [[id number]]  (Abnormal) Collected: 11/06/22 0611    Specimen: Blood Updated: 11/06/22 0658    Narrative:      The following orders were created for panel order CBC & Differential.  Procedure                               Abnormality         Status                     ---------                               -----------         ------                     CBC Auto Differential[[id number]]        Abnormal            Final result                 Please view results for these tests on the individual orders.    CBC Auto Differential [[id number]]  (Abnormal) Collected: 11/06/22 0611    Specimen: Blood Updated: 11/06/22 0658     WBC 2.88 10*3/mm3      RBC 3.14 10*6/mm3      Hemoglobin 9.7 g/dL      Hematocrit 30.0 %      MCV 95.5 fL      MCH 30.9 pg      MCHC 32.3 g/dL      RDW 15.5 %      RDW-SD 53.6 fl      MPV 9.2 fL      Platelets 309 10*3/mm3      Neutrophil % 38.2 %      Lymphocyte % 40.3 %      Monocyte % 14.6 %      Eosinophil % 5.9 %      Basophil % 1.0 %      Immature Grans % 0.0 %      Neutrophils, Absolute 1.10 10*3/mm3      Lymphocytes, Absolute 1.16 10*3/mm3       Monocytes, Absolute 0.42 10*3/mm3      Eosinophils, Absolute 0.17 10*3/mm3      Basophils, Absolute 0.03 10*3/mm3      Immature Grans, Absolute 0.00 10*3/mm3      nRBC 0.0 /100 WBC     Basic Metabolic Panel [[id number]]  (Abnormal) Collected: 11/05/22 0645    Specimen: Blood Updated: 11/05/22 0735     Glucose 82 mg/dL      BUN 11 mg/dL      Creatinine 0.55 mg/dL      Sodium 141 mmol/L      Potassium 3.5 mmol/L      Chloride 104 mmol/L      CO2 29.1 mmol/L      Calcium 8.6 mg/dL      BUN/Creatinine Ratio 20.0     Anion Gap 7.9 mmol/L      eGFR 91.6 mL/min/1.73      Comment: National Kidney Foundation and American Society of Nephrology (ASN) Task Force recommended calculation based on the Chronic Kidney Disease Epidemiology Collaboration (CKD-EPI) equation refit without adjustment for race.       Narrative:      GFR Normal >60  Chronic Kidney Disease <60  Kidney Failure <15    The GFR formula is only valid for adults with stable renal function between ages 18 and 70.    CBC & Differential [[id number]]  (Abnormal) Collected: 11/05/22 0645    Specimen: Blood Updated: 11/05/22 0720    Narrative:      The following orders were created for panel order CBC & Differential.  Procedure                               Abnormality         Status                     ---------                               -----------         ------                     CBC Auto Differential[[id number]]        Abnormal            Final result                 Please view results for these tests on the individual orders.    CBC Auto Differential [[id number]]  (Abnormal) Collected: 11/05/22 0645    Specimen: Blood Updated: 11/05/22 0720     WBC 3.09 10*3/mm3      RBC 3.11 10*6/mm3      Hemoglobin 9.8 g/dL      Hematocrit 29.7 %      MCV 95.5 fL      MCH 31.5 pg      MCHC 33.0 g/dL      RDW 15.5 %      RDW-SD 52.6 fl      MPV 9.0 fL      Platelets 305 10*3/mm3      Neutrophil % 37.8 %      Lymphocyte % 39.5 %      Monocyte % 15.2 %      Eosinophil % 6.5 %       Basophil % 1.0 %      Immature Grans % 0.0 %      Neutrophils, Absolute 1.17 10*3/mm3      Lymphocytes, Absolute 1.22 10*3/mm3      Monocytes, Absolute 0.47 10*3/mm3      Eosinophils, Absolute 0.20 10*3/mm3      Basophils, Absolute 0.03 10*3/mm3      Immature Grans, Absolute 0.00 10*3/mm3      nRBC 0.0 /100 WBC         Data Review:  Results from last 7 days   Lab Units 11/06/22  0611 11/05/22  0645 11/04/22 0613   SODIUM mmol/L 140 141 140   POTASSIUM mmol/L 3.5 3.5 3.6   CHLORIDE mmol/L 103 104 103   CO2 mmol/L 30.0* 29.1* 25.4   BUN mg/dL 13 11 14   CREATININE mg/dL 0.55* 0.55* 0.57   GLUCOSE mg/dL 78 82 84   CALCIUM mg/dL 8.8 8.6 8.8     Results from last 7 days   Lab Units 11/06/22  0611 11/05/22 0645 11/04/22 0613   WBC 10*3/mm3 2.88* 3.09* 3.21*   HEMOGLOBIN g/dL 9.7* 9.8* 9.9*   HEMATOCRIT % 30.0* 29.7* 30.1*   PLATELETS 10*3/mm3 309 305 306             Lab Results   Lab Value Date/Time    TROPONINT <0.010 10/25/2022 0303               Invalid input(s): PROT, LABALBU          No results found for: POCGLU        Past Medical History:   Diagnosis Date   • Anxiety    • Cancer (HCC)     Stage I, right breast cancer   • Depression    • Disease of thyroid gland     hypothyroidusm   • Erythrocytosis    • Hypercholesterolemia    • Hypertension    • Osteoporosis     osteopenia mild       Assessment:  Active Hospital Problems    Diagnosis  POA   • **Near syncope [R55]  Yes   • Other neutropenia (HCC) [D70.8]  Yes   • Orthostatic hypotension [I95.1]  Yes   • Hypothyroidism (acquired) [E03.9]  Yes   • HTN (hypertension) [I10]  Yes   • Hypokalemia [E87.6]  Yes   • Symptomatic anemia [D64.9]  Yes      Resolved Hospital Problems    Diagnosis Date Resolved POA   • Hyponatremia [E87.1] 10/29/2022 Yes       Plan:  Continue current RX. Follow lab. Will need SNU for rehab.  Await PreCert.    Rohan Mantilla MD  11/7/2022  14:09 EST

## 2022-11-07 NOTE — CASE MANAGEMENT/SOCIAL WORK
Continued Stay Note  [facility]     Patient Name: Rekha Campos  MRN: [id number]  Today's Date: 11/7/2022    Admit Date: 10/25/2022    Plan: [facility] [place] pending [employer] MCR- family to transport   Discharge Plan     Row Name 11/07/22 1409       Plan    Plan [facility] SNF pending [employer] MCR- family to transport    Patient/Family in Agreement with Plan other (see comments)    Plan Comments Spoke with Karli/Catherine for [facility] [place] precert remains pending. Insurance did request updated PT notes this am, which have been submitted, await call back form Cheko Monge RN/CCP               Discharge Codes    No documentation.               Expected Discharge Date and Time     Expected Discharge Date Expected Discharge Time    Nov 4, 2022             Dea Zavala RN

## 2022-11-07 NOTE — PLAN OF CARE
Goal Outcome Evaluation:  Plan of Care Reviewed With: patient        Progress: improving  Outcome Evaluation: Pt in bed at beg of PT session. Pt sat up to EOB w/ SBA. Pt then stood up w/ CGA and use of fww and denied dizziness when asked. Pt amb approx 60' req CGA to min A and use of fww. Incr unsteadiness noted w/ distance. Pt reporting dizziness when asked when she returned to room w/ 1 mild LOB as pt neared bed. Pt's BP taken when pt seated - 148/91. Pt then stood again w/ /70. RN notified. Pt performed B LE ther ex for strengthening. Pt then returned to bed w/ SBA. PT will prog as pt lenny.    Patient was intermittently wearing a face mask during this therapy encounter. Therapist used appropriate personal protective equipment including mask and gloves.  Mask used was standard procedure mask. Appropriate PPE was worn during the entire therapy session. Hand hygiene was completed before and after therapy session. Patient is not in enhanced droplet precautions.

## 2022-11-08 PROCEDURE — 97530 THERAPEUTIC ACTIVITIES: CPT

## 2022-11-08 RX ADMIN — ATORVASTATIN CALCIUM 40 MG: 20 TABLET, FILM COATED ORAL at 09:51

## 2022-11-08 RX ADMIN — FOLIC ACID 1000 MCG: 1 TABLET ORAL at 09:51

## 2022-11-08 RX ADMIN — FLUDROCORTISONE ACETATE 100 MCG: 0.1 TABLET ORAL at 09:51

## 2022-11-08 RX ADMIN — BUPROPION HYDROCHLORIDE 150 MG: 150 TABLET, EXTENDED RELEASE ORAL at 09:51

## 2022-11-08 RX ADMIN — VENLAFAXINE HYDROCHLORIDE 75 MG: 75 CAPSULE, EXTENDED RELEASE ORAL at 09:51

## 2022-11-08 RX ADMIN — Medication 5 MG: at 20:30

## 2022-11-08 RX ADMIN — MIRTAZAPINE 30 MG: 30 TABLET, FILM COATED ORAL at 20:30

## 2022-11-08 RX ADMIN — LEVOTHYROXINE SODIUM 100 MCG: 0.1 TABLET ORAL at 06:02

## 2022-11-08 NOTE — THERAPY TREATMENT NOTE
Patient Name: Rekha Campos  : 1940    MRN: [id number]                              Today's Date: 2022       Admit Date: 10/25/2022    Visit Dx:     ICD-10-CM ICD-9-CM   1. Near syncope  R55 780.2   2. Anemia, unspecified type  D64.9 285.9   3. Generalized weakness  R53.1 780.79   4. Precipitous drop in hematocrit   R71.0 790.01   5. Pancytopenia (HCC)  D61.818 284.19   6. Malignant neoplasm of areola of right breast in female, unspecified estrogen receptor status (HCC)  C50.011 174.0   7. Orthostatic hypotension  I95.1 458.0     Patient Active Problem List   Diagnosis   • Malignant neoplasm of areola of right breast in female (HCC)   • Osteoporosis   • Osteopenia of multiple sites   • Symptomatic anemia   • Near syncope   • Hypothyroidism (acquired)   • HTN (hypertension)   • Hypokalemia   • Orthostatic hypotension   • Other neutropenia (HCC)     Past Medical History:   Diagnosis Date   • Anxiety    • Cancer (HCC)     Stage I, right breast cancer   • Depression    • Disease of thyroid gland     hypothyroidusm   • Erythrocytosis    • Hypercholesterolemia    • Hypertension    • Osteoporosis     osteopenia mild     Past Surgical History:   Procedure Laterality Date   • BREAST LUMPECTOMY Right    • COLONOSCOPY     • EYE SURGERY  2004    corneal transplant   • HYSTERECTOMY     • SHOULDER SURGERY        General Information     Row Name 22 1139          Physical Therapy Time and Intention    Document Type therapy note (daily note)  -PC     Mode of Treatment physical therapy  -PC     Row Name 22 1139          General Information    Existing Precautions/Restrictions fall  -PC           User Key  (r) = Recorded By, (t) = Taken By, (c) = Cosigned By    Initials Name Provider Type    PC Jazmine Larson PT Physical Therapist               Mobility     Row Name 22 1139          Bed Mobility    Supine-Sit [place] (Bed Mobility) standby assist  -PC     Sit-Supine [place] (Bed  Mobility) standby assist  -PC     Row Name 11/08/22 1139          Sit-Stand Transfer    Sit-Stand [place] (Transfers) contact guard  -PC     Assistive Device (Sit-Stand Transfers) walker, front-wheeled  -PC     Row Name 11/08/22 1139          Gait/Stairs (Locomotion)    [place] Level (Gait) minimum assist (75% patient effort);contact guard  -PC     Assistive Device (Gait) walker, front-wheeled  -PC     Distance in Feet (Gait) 40 ft in funes, occasional loss of balance, needed assist to correct, difficulty with turns  -PC     Deviations/Abnormal Patterns (Gait) gait speed decreased;stride length decreased  -PC     Bilateral Gait Deviations forward flexed posture  -PC           User Key  (r) = Recorded By, (t) = Taken By, (c) = Cosigned By    Initials Name Provider Type    PC Jazmine Larson, PT Physical Therapist               Obj/Interventions     Row Name 11/08/22 1144          Balance    Balance Assessment standing dynamic balance  -PC     Static Sitting Balance independent  -PC     Position, Sitting Balance sitting edge of bed  -PC     Static Standing Balance contact guard  -PC     Dynamic Standing Balance minimal assist  -PC     Position/Device Used, Standing Balance walker, rolling  -PC           User Key  (r) = Recorded By, (t) = Taken By, (c) = Cosigned By    Initials Name Provider Type    PC Jazmine Larson PT Physical Therapist               Goals/Plan     Row Name 11/08/22 1148          Bed Mobility Goal 1 (PT)    Activity/Assistive Device (Bed Mobility Goal 1, PT) bed mobility activities, all  -PC     [place] Level/Cues Needed (Bed Mobility Goal 1, PT) independent  -PC     Time Frame (Bed Mobility Goal 1, PT) 1 week  -PC     Row Name 11/08/22 1148          Transfer Goal 1 (PT)    Activity/Assistive Device (Transfer Goal 1, PT) sit-to-stand/stand-to-sit;bed-to-chair/chair-to-bed  -PC     [place] Level/Cues Needed (Transfer Goal 1, PT) independent  -PC     Time Frame (Transfer Goal 1,  PT) 1 week  -PC     Row Name 11/08/22 1148          Gait Training Goal 1 (PT)    Activity/Assistive Device (Gait Training Goal 1, PT) gait (walking locomotion)  -PC     [place] Level (Gait Training Goal 1, PT) independent  -PC     Distance (Gait Training Goal 1, PT) 150  -PC     Time Frame (Gait Training Goal 1, PT) 1 week  -PC           User Key  (r) = Recorded By, (t) = Taken By, (c) = Cosigned By    Initials Name Provider Type    PC Jazmine Larson, PT Physical Therapist               Clinical Impression     Row Name 11/08/22 1145          Pain    Pretreatment Pain Rating 0/10 - no pain  -PC     Row Name 11/08/22 1145          Plan of Care Review    Plan of Care Reviewed With patient  -PC     Progress improving  -PC     Outcome Evaluation Pt a little steadier today, put her shoes on today to walk, still very dependent on walker, occasional unsteadiness, requiring assist of another person to correct, difficulty with turns, pt cont to be below her baseline, pt did not use a walker previously, pt would not be safe to return home alone as her spouse is currently in the hospital as well  -PC     Row Name 11/08/22 1145          Positioning and Restraints    Pre-Treatment Position in bed  -PC     Post Treatment Position bed  -PC     In Bed supine;call light within reach;encouraged to call for assist;exit alarm on  -PC           User Key  (r) = Recorded By, (t) = Taken By, (c) = Cosigned By    Initials Name Provider Type    PC Jazmine Larson, PT Physical Therapist               Outcome Measures     Row Name 11/08/22 1145          How much help from another person do you currently need...    Turning from your back to your side while in flat bed without using bedrails? 4  -PC     Moving from lying on back to sitting on the side of a flat bed without bedrails? 4  -PC     Moving to and from a bed to a chair (including a wheelchair)? 3  -PC     Standing up from a chair using your arms (e.g., wheelchair, bedside chair)?  3  -PC     Climbing 3-5 steps with a railing? 2  -PC     To walk in hospital room? 3  -PC     AM-PAC 6 Clicks Score (PT) 19  -PC     Highest level of mobility 6 --> Walked 10 steps or more  -PC           User Key  (r) = Recorded By, (t) = Taken By, (c) = Cosigned By    Initials Name Provider Type    Jazmine Houser PT Physical Therapist                             Physical Therapy Education     Title: PT OT SLP Therapies (Done)     Topic: Physical Therapy (Done)     Point: Mobility training (Done)     Learning Progress Summary           Patient Acceptance, E,D, VU by  at 11/7/2022 1546    Acceptance, E, VU,NR by  at 11/6/2022 1329    Acceptance, E,TB,D, VU,NR by  at 11/2/2022 1634    Acceptance, E,TB,D, VU by  at 10/31/2022 1159    Acceptance, E, VU,NR by  at 10/29/2022 1447    Acceptance, E,TB,D, VU,NR by  at 10/28/2022 1319    Acceptance, E, NR by  at 10/26/2022 1149                   Point: Home exercise program (Done)     Learning Progress Summary           Patient Acceptance, E,D, VU by  at 11/7/2022 1546    Acceptance, E, VU,NR by  at 11/6/2022 1329    Acceptance, E,TB,D, VU,NR by  at 10/28/2022 1319    Acceptance, E, NR by  at 10/26/2022 1149                   Point: Body mechanics (Done)     Learning Progress Summary           Patient Acceptance, E,D, VU by  at 11/7/2022 1546    Acceptance, E, VU,NR by  at 11/6/2022 1329    Acceptance, E,TB,D, VU,NR by  at 10/28/2022 1319    Acceptance, E, NR by  at 10/26/2022 1149                   Point: Precautions (Done)     Learning Progress Summary           Patient Acceptance, E,D, VU by  at 11/7/2022 1546    Acceptance, E, VU,NR by  at 11/6/2022 1329    Acceptance, E, VU,NR by  at 10/29/2022 1447    Acceptance, E,TB,D, VU,NR by  at 10/28/2022 1319    Acceptance, E, NR by  at 10/26/2022 1149                               User Key     Initials Effective Dates Name Provider Type Discipline     06/16/21 -  Dea Valderrama  [place], PT Physical Therapist PT    CH 06/16/21 -  Mony Au, PT Physical Therapist PT    LH 06/16/21 -  Esther Thacker, PT Physical Therapist PT    EJ 06/16/21 -  Mary Jane Stack, PT Physical Therapist PT    PH 06/16/21 -  Apolonia Lomeli, PTA Physical Therapist Assistant PT    CF 06/16/21 -  Jacklyn De La Rosa, PT Physical Therapist PT              PT Recommendation and Plan     Plan of Care Reviewed With: patient  Progress: improving  Outcome Evaluation: Pt a little steadier today, put her shoes on today to walk, still very dependent on walker, occasional unsteadiness, requiring assist of another person to correct, difficulty with turns, pt cont to be below her baseline, pt did not use a walker previously, pt would not be safe to return home alone as her spouse is currently in the hospital as well     Time Calculation:    PT Charges     Row Name 11/08/22 1148             Time Calculation    Start Time 1027  -PC      Stop Time 1037  -PC      Time Calculation (min) 10 min  -PC      PT Received On 11/08/22  -PC      PT - Next Appointment 11/09/22  -PC      PT Goal Re-Cert Due Date 11/15/22  -PC            User Key  (r) = Recorded By, (t) = Taken By, (c) = Cosigned By    Initials Name Provider Type    PC Jazmine Larson PT Physical Therapist              Therapy Charges for Today     Code Description Service Date Service Provider Modifiers Qty    [id number]  PT THERAPEUTIC ACT EA 15 MIN 11/8/2022 Jazmine Larson PT GP 1          PT G-Codes  Outcome Measure Options: AM-PAC 6 Clicks Basic Mobility (PT)  AM-PAC 6 Clicks Score (PT): 19    Jazmine Larson PT  11/8/2022

## 2022-11-08 NOTE — PLAN OF CARE
Problem: Heart Failure Comorbidity  Goal: Maintenance of Heart Failure Symptom Control  Outcome: Ongoing, Progressing  Intervention: Maintain Heart Failure-Management  Recent Flowsheet Documentation  Taken 11/8/2022 1618 by Leatha Hsu RN  Medication Review/Management: medications reviewed  Taken 11/8/2022 1450 by Leatha Hsu RN  Medication Review/Management: medications reviewed  Taken 11/8/2022 1238 by Leatha Hsu RN  Medication Review/Management: medications reviewed  Taken 11/8/2022 1040 by Leatha Hsu RN  Medication Review/Management: medications reviewed  Taken 11/8/2022 0951 by Leatha Hsu RN  Medication Review/Management: medications reviewed  Taken 11/8/2022 0800 by Leatha Hsu RN  Medication Review/Management: medications reviewed     Problem: Fall Injury Risk  Goal: Absence of Fall and Fall-Related Injury  Outcome: Ongoing, Progressing  Intervention: Identify and Manage Contributors  Recent Flowsheet Documentation  Taken 11/8/2022 1618 by Leatha Hsu RN  Medication Review/Management: medications reviewed  Taken 11/8/2022 1450 by Leatha Hsu RN  Medication Review/Management: medications reviewed  Taken 11/8/2022 1238 by Leatha Hsu RN  Medication Review/Management: medications reviewed  Taken 11/8/2022 1040 by Leatha Hsu RN  Medication Review/Management: medications reviewed  Taken 11/8/2022 0951 by Leatha Hsu RN  Medication Review/Management: medications reviewed  Taken 11/8/2022 0800 by Leatha Hsu RN  Medication Review/Management: medications reviewed  Intervention: Promote Injury-Free Environment  Recent Flowsheet Documentation  Taken 11/8/2022 1618 by Leatha Hsu RN  Safety Promotion/Fall Prevention:   fall prevention program maintained   safety round/check completed   nonskid shoes/slippers when out of bed  Taken 11/8/2022 1450 by Leatha Hsu RN  Safety Promotion/Fall Prevention:   fall  prevention program maintained   safety round/check completed   nonskid shoes/slippers when out of bed  Taken 11/8/2022 1238 by Leatha Hsu, RN  Safety Promotion/Fall Prevention:   fall prevention program maintained   safety round/check completed   nonskid shoes/slippers when out of bed  Taken 11/8/2022 1040 by Leatha Hsu, RN  Safety Promotion/Fall Prevention:   fall prevention program maintained   safety round/check completed   nonskid shoes/slippers when out of bed  Taken 11/8/2022 0951 by Leatha Hsu, RN  Safety Promotion/Fall Prevention:   fall prevention program maintained   safety round/check completed   nonskid shoes/slippers when out of bed  Taken 11/8/2022 0800 by Leatha Hsu, RN  Safety Promotion/Fall Prevention:   fall prevention program maintained   safety round/check completed   nonskid shoes/slippers when out of bed   Goal Outcome Evaluation:

## 2022-11-08 NOTE — PLAN OF CARE
Goal Outcome Evaluation:  Plan of Care Reviewed With: patient        Progress: improving  Outcome Evaluation: Pt a little steadier today, put her shoes on today to walk, still very dependent on walker, occasional unsteadiness, requiring assist of another person to correct, difficulty with turns, pt cont to be below her baseline, pt did not use a walker previously, pt would not be safe to return home alone as her spouse is currently in the hospital as well

## 2022-11-08 NOTE — CASE MANAGEMENT/SOCIAL WORK
Continued Stay Note  [facility]     Patient Name: Rekha Campos  MRN: [id number]  Today's Date: 11/8/2022    Admit Date: 10/25/2022    Plan: [facility] SNF denied- requested peer to peer vs Home with [facility]   Discharge Plan     Row Name 11/08/22 1710       Plan    Plan [facility] SNF denied- requested peer to peer vs Home with [facility]    Plan Comments CCP [place] [facility] [place] due to needs can be met at a lower level of care, requested Peer to Peer to be done by noon tomorrow. CCP sent to [place] review for consideration. CCP called pts daughter Wanda and updated her on the SNF denial and assessing clinicals. Discussed backup plan and she states plan would be home with [facility] and family assistance. Referral to [facility], CCP to follow up tomorrow. Mariposa PEDERSEN/BRAD    Row Name 11/08/22 1623       Plan    Plan [facility] [place] pending [employer] MCR Pre-cert    Plan Comments Spoke with [facility] pre-cert remains pending. Per [facility]  message left for [employer] M[employer] regarding status of pre-cert. CCP called pts daughter Eli via outbound call and discussed pre-cert remains pending and encouraged her to call [employer] M[employer]. Discussed possible SNF denial if pre-cert not obtained. Packet in ccp office. Mariposa PEDERSEN/CPC               Discharge Codes    No documentation.               Expected Discharge Date and Time     Expected Discharge Date Expected Discharge Time    Nov 8, 2022             Dea Zavala RN

## 2022-11-08 NOTE — CASE MANAGEMENT/SOCIAL WORK
Continued Stay Note  [facility]     Patient Name: Rekha Campos  MRN: [id number]  Today's Date: 11/8/2022    Admit Date: 10/25/2022    Plan: [facility] [place] [place] SNF pending [employer] Pre-cert   Discharge Plan     Row Name 11/08/22 1623       Plan    Plan [facility] [place] [place] SNF pending [employer] Pre-cert    Plan Comments Spoke with [facility] [place] [place] pre-cert remains pending. Per Tri/Tonny  message left for [employer] regarding status of pre-cert. CCP called pts daughter Eli via outbound call and discussed pre-cert remains pending and encouraged her to call [employer]. Discussed possible SNF denial if pre-cert not obtained. Packet in ccp office. [facility] PEDERSEN/[facility]               Discharge Codes    No documentation.               Expected Discharge Date and Time     Expected Discharge Date Expected Discharge Time    Nov 8, 2022             Dea Zavala RN

## 2022-11-08 NOTE — PROGRESS NOTES
"DAILY PROGRESS NOTE  [facility]    Patient Identification:  Name: Rekha Campos  Age: 82 y.o.  Sex: female  :  1940  MRN: [id number]         Primary Care Physician: Roseanne Sanchez MD    Subjective:  Interval History:No new complaints.    Objective:    Scheduled Meds:acetaminophen, 650 mg, Oral, Once  atorvastatin, 40 mg, Oral, Daily  buPROPion XL, 150 mg, Oral, Daily  diphenhydrAMINE, 25 mg, Oral, Once  fludrocortisone, 100 mcg, Oral, Daily  folic acid, 1,000 mcg, Oral, Daily  levothyroxine, 100 mcg, Oral, Daily  mirtazapine, 30 mg, Oral, Nightly  venlafaxine XR, 75 mg, Oral, Daily      Continuous Infusions:     Vital signs in last 24 hours:  Temp:  [98.1 °F (36.7 °C)-98.5 °F (36.9 °C)] 98.3 °F (36.8 °C)  Heart Rate:  [64-75] 75  Resp:  [16-18] 18  BP: (151-177)/(81-96) 151/81    Intake/Output:    Intake/Output Summary (Last 24 hours) at 2022 1639  Last data filed at 2022 0522  Gross per 24 hour   Intake 240 ml   Output 1200 ml   Net -960 ml       Exam:  /81 (BP Location: Right arm, Patient Position: Lying)   Pulse 75   Temp 98.3 °F (36.8 °C) (Oral)   Resp 18   Ht 172.7 cm (68\")   Wt 69.9 kg (154 lb 1.6 oz)   SpO2 94%   BMI 23.43 kg/m²     General Appearance:    Alert, cooperative, no distress   Head:    Normocephalic, without obvious abnormality, atraumatic   Eyes:       Throat:   Lips, tongue, gums normal   Neck:   Supple, symmetrical, trachea midline, no JVD   Lungs:     Clear to auscultation bilaterally, respirations unlabored   Chest Wall:    No tenderness or deformity    Heart:    Regular rate and rhythm, S1 and S2 normal, no murmur,no  Rub or gallop   Abdomen:     Soft, nontender, bowel sounds active, no masses, no organomegaly    Extremities:   Extremities normal, atraumatic, no cyanosis or edema   Pulses:      Skin:   Skin is warm and dry,  no rashes or palpable lesions   Neurologic:   no focal deficits noted      Lab Results (last 72 hours)     " Procedure Component Value Units Date/Time    Basic Metabolic Panel [[id number]]  (Abnormal) Collected: 11/06/22 0611    Specimen: Blood Updated: 11/06/22 0714     Glucose 78 mg/dL      BUN 13 mg/dL      Creatinine 0.55 mg/dL      Sodium 140 mmol/L      Potassium 3.5 mmol/L      Chloride 103 mmol/L      CO2 30.0 mmol/L      Calcium 8.8 mg/dL      BUN/Creatinine Ratio 23.6     Anion Gap 7.0 mmol/L      eGFR 91.6 mL/min/1.73      Comment: National Kidney Foundation and American Society of Nephrology (ASN) Task Force recommended calculation based on the Chronic Kidney Disease Epidemiology Collaboration (CKD-EPI) equation refit without adjustment for race.       Narrative:      GFR Normal >60  Chronic Kidney Disease <60  Kidney Failure <15    The GFR formula is only valid for adults with stable renal function between ages 18 and 70.    CBC & Differential [[id number]]  (Abnormal) Collected: 11/06/22 0611    Specimen: Blood Updated: 11/06/22 0658    Narrative:      The following orders were created for panel order CBC & Differential.  Procedure                               Abnormality         Status                     ---------                               -----------         ------                     CBC Auto Differential[[id number]]        Abnormal            Final result                 Please view results for these tests on the individual orders.    CBC Auto Differential [[id number]]  (Abnormal) Collected: 11/06/22 0611    Specimen: Blood Updated: 11/06/22 0658     WBC 2.88 10*3/mm3      RBC 3.14 10*6/mm3      Hemoglobin 9.7 g/dL      Hematocrit 30.0 %      MCV 95.5 fL      MCH 30.9 pg      MCHC 32.3 g/dL      RDW 15.5 %      RDW-SD 53.6 fl      MPV 9.2 fL      Platelets 309 10*3/mm3      Neutrophil % 38.2 %      Lymphocyte % 40.3 %      Monocyte % 14.6 %      Eosinophil % 5.9 %      Basophil % 1.0 %      Immature Grans % 0.0 %      Neutrophils, Absolute 1.10 10*3/mm3      Lymphocytes, Absolute 1.16 10*3/mm3       Monocytes, Absolute 0.42 10*3/mm3      Eosinophils, Absolute 0.17 10*3/mm3      Basophils, Absolute 0.03 10*3/mm3      Immature Grans, Absolute 0.00 10*3/mm3      nRBC 0.0 /100 WBC     Basic Metabolic Panel [[id number]]  (Abnormal) Collected: 11/05/22 0645    Specimen: Blood Updated: 11/05/22 0735     Glucose 82 mg/dL      BUN 11 mg/dL      Creatinine 0.55 mg/dL      Sodium 141 mmol/L      Potassium 3.5 mmol/L      Chloride 104 mmol/L      CO2 29.1 mmol/L      Calcium 8.6 mg/dL      BUN/Creatinine Ratio 20.0     Anion Gap 7.9 mmol/L      eGFR 91.6 mL/min/1.73      Comment: National Kidney Foundation and American Society of Nephrology (ASN) Task Force recommended calculation based on the Chronic Kidney Disease Epidemiology Collaboration (CKD-EPI) equation refit without adjustment for race.       Narrative:      GFR Normal >60  Chronic Kidney Disease <60  Kidney Failure <15    The GFR formula is only valid for adults with stable renal function between ages 18 and 70.    CBC & Differential [[id number]]  (Abnormal) Collected: 11/05/22 0645    Specimen: Blood Updated: 11/05/22 0720    Narrative:      The following orders were created for panel order CBC & Differential.  Procedure                               Abnormality         Status                     ---------                               -----------         ------                     CBC Auto Differential[[id number]]        Abnormal            Final result                 Please view results for these tests on the individual orders.    CBC Auto Differential [[id number]]  (Abnormal) Collected: 11/05/22 0645    Specimen: Blood Updated: 11/05/22 0720     WBC 3.09 10*3/mm3      RBC 3.11 10*6/mm3      Hemoglobin 9.8 g/dL      Hematocrit 29.7 %      MCV 95.5 fL      MCH 31.5 pg      MCHC 33.0 g/dL      RDW 15.5 %      RDW-SD 52.6 fl      MPV 9.0 fL      Platelets 305 10*3/mm3      Neutrophil % 37.8 %      Lymphocyte % 39.5 %      Monocyte % 15.2 %      Eosinophil % 6.5 %       Basophil % 1.0 %      Immature Grans % 0.0 %      Neutrophils, Absolute 1.17 10*3/mm3      Lymphocytes, Absolute 1.22 10*3/mm3      Monocytes, Absolute 0.47 10*3/mm3      Eosinophils, Absolute 0.20 10*3/mm3      Basophils, Absolute 0.03 10*3/mm3      Immature Grans, Absolute 0.00 10*3/mm3      nRBC 0.0 /100 WBC         Data Review:  Results from last 7 days   Lab Units 11/06/22  0611 11/05/22  0645 11/04/22 0613   SODIUM mmol/L 140 141 140   POTASSIUM mmol/L 3.5 3.5 3.6   CHLORIDE mmol/L 103 104 103   CO2 mmol/L 30.0* 29.1* 25.4   BUN mg/dL 13 11 14   CREATININE mg/dL 0.55* 0.55* 0.57   GLUCOSE mg/dL 78 82 84   CALCIUM mg/dL 8.8 8.6 8.8     Results from last 7 days   Lab Units 11/06/22  0611 11/05/22 0645 11/04/22 0613   WBC 10*3/mm3 2.88* 3.09* 3.21*   HEMOGLOBIN g/dL 9.7* 9.8* 9.9*   HEMATOCRIT % 30.0* 29.7* 30.1*   PLATELETS 10*3/mm3 309 305 306             Lab Results   Lab Value Date/Time    TROPONINT <0.010 10/25/2022 0303               Invalid input(s): PROT, LABALBU          No results found for: POCGLU        Past Medical History:   Diagnosis Date   • Anxiety    • Cancer (HCC)     Stage I, right breast cancer   • Depression    • Disease of thyroid gland     hypothyroidusm   • Erythrocytosis    • Hypercholesterolemia    • Hypertension    • Osteoporosis     osteopenia mild       Assessment:  Active Hospital Problems    Diagnosis  POA   • **Near syncope [R55]  Yes   • Other neutropenia (HCC) [D70.8]  Yes   • Orthostatic hypotension [I95.1]  Yes   • Hypothyroidism (acquired) [E03.9]  Yes   • HTN (hypertension) [I10]  Yes   • Hypokalemia [E87.6]  Yes   • Symptomatic anemia [D64.9]  Yes      Resolved Hospital Problems    Diagnosis Date Resolved POA   • Hyponatremia [E87.1] 10/29/2022 Yes       Plan:  Continue current RX. Follow lab. Will need SNU for rehab.  Await PreCert.    Rohan Mantilla MD  11/8/2022  16:39 EST

## 2022-11-09 ENCOUNTER — HOME HEALTH ADMISSION (OUTPATIENT)
Dept: HOME HEALTH SERVICES | Facility: HOME HEALTHCARE | Age: 82
End: 2022-11-09
Payer: COMMERCIAL

## 2022-11-09 ENCOUNTER — READMISSION MANAGEMENT (OUTPATIENT)
Dept: CALL CENTER | Facility: HOSPITAL | Age: 82
End: 2022-11-09

## 2022-11-09 VITALS
SYSTOLIC BLOOD PRESSURE: 168 MMHG | TEMPERATURE: 98.2 F | WEIGHT: 154.1 LBS | HEIGHT: 68 IN | BODY MASS INDEX: 23.36 KG/M2 | DIASTOLIC BLOOD PRESSURE: 92 MMHG | HEART RATE: 76 BPM | RESPIRATION RATE: 16 BRPM | OXYGEN SATURATION: 95 %

## 2022-11-09 LAB — QT INTERVAL: 431 MS

## 2022-11-09 PROCEDURE — 93005 ELECTROCARDIOGRAM TRACING: CPT | Performed by: HOSPITALIST

## 2022-11-09 PROCEDURE — 93010 ELECTROCARDIOGRAM REPORT: CPT | Performed by: INTERNAL MEDICINE

## 2022-11-09 RX ORDER — FLUDROCORTISONE ACETATE 0.1 MG/1
0.1 TABLET ORAL DAILY
Qty: 30 TABLET | Refills: 0 | Status: SHIPPED | OUTPATIENT
Start: 2022-11-10 | End: 2022-12-10

## 2022-11-09 RX ADMIN — VENLAFAXINE HYDROCHLORIDE 75 MG: 75 CAPSULE, EXTENDED RELEASE ORAL at 08:47

## 2022-11-09 RX ADMIN — ATORVASTATIN CALCIUM 40 MG: 20 TABLET, FILM COATED ORAL at 08:47

## 2022-11-09 RX ADMIN — FLUDROCORTISONE ACETATE 100 MCG: 0.1 TABLET ORAL at 08:47

## 2022-11-09 RX ADMIN — FOLIC ACID 1000 MCG: 1 TABLET ORAL at 08:47

## 2022-11-09 RX ADMIN — BUPROPION HYDROCHLORIDE 150 MG: 150 TABLET, EXTENDED RELEASE ORAL at 08:47

## 2022-11-09 RX ADMIN — LEVOTHYROXINE SODIUM 100 MCG: 0.1 TABLET ORAL at 06:19

## 2022-11-09 NOTE — PROGRESS NOTES
"[facility] to provide Home Care services. Patient agreeable. She said she has not had HH before, but her  has had [facility] \"many times.\" He is a Current patient of [facility]. PCP and contact information confirmed. Please call daughter Eli to schedule HH visits. [phone]. They request Emma or Medina if available.     VO received from Gita for PCP Dr Sanchez to [facility] for HH.  "

## 2022-11-09 NOTE — DISCHARGE SUMMARY
PHYSICIAN DISCHARGE SUMMARY                                                                        [facility]    Patient Identification:  Name: Rekha Campos  Age: 82 y.o.  Sex: female  :  1940  MRN: [id number]  Primary Care Physician: Roseanne Sanchez MD    Admit date: 10/25/2022  Discharge date and time:2022  Discharged Condition: good    Discharge Diagnoses:  Active Hospital Problems    Diagnosis  POA   • **Near syncope [R55]  Yes   • Other neutropenia (HCC) [D70.8]  Yes   • Orthostatic hypotension [I95.1]  Yes   • Hypothyroidism (acquired) [E03.9]  Yes   • HTN (hypertension) [I10]  Yes   • Hypokalemia [E87.6]  Yes   • Symptomatic anemia [D64.9]  Yes      Resolved Hospital Problems    Diagnosis Date Resolved POA   • Hyponatremia [E87.1] 10/29/2022 Yes          PMHX:   Past Medical History:   Diagnosis Date   • Anxiety    • Cancer (HCC)     Stage I, right breast cancer   • Depression    • Disease of thyroid gland     hypothyroidusm   • Erythrocytosis    • Hypercholesterolemia    • Hypertension    • Osteoporosis     osteopenia mild     PSHX:   Past Surgical History:   Procedure Laterality Date   • BREAST LUMPECTOMY Right    • COLONOSCOPY     • EYE SURGERY      corneal transplant   • HYSTERECTOMY     • SHOULDER SURGERY         Hospital Course: Rekha Campos  is a 82 y.o. breast cancer, pancytopenia, HTN, HLD that presents with near syncope.  States she has several episodes of near syncope which has been ongoing for years but worse in the last 2 weeks prior to admission.  She has had 1 episode where she did complete lose consciousness but her  was able to catch her.  She denies loss of bowel or bladder control, seizure activity, known injury, injury to the head.  Symptoms are not associated with chest pain, diaphoresis or palpitations.  Patient was admitted to [facility] last week for  pneumonia, treated with IV antibiotics and discharged on oral Augmentin.  She feels better from the standpoint of pneumonia and denies cough, shortness of breath, chest pain, fever or congestion.  She was seen by hematology on the day prior to admission and noted to have progressive anemia.  She is scheduled for transfusion of PRBC.  She is not on anticoagulants and denies overt bleeding.  She denies chest pain or palpitations.  Patient was admitted to the hospital and seen by cardiology and oncology.  Patient had adjustment of her medicine and Florinef was added.  Several blood pressure medicines were stopped.  She was feeling better but still little weak.  Plan is to go home with home health services.  She will follow-up with her primary care in 1 week for ongoing care.      Consults:     Consults     Date and Time Order Name Status Description    10/31/2022  2:48 PM Inpatient Cardiology Consult Completed     10/25/2022 10:31 AM Hematology & Oncology Inpatient Consult      10/25/2022  6:40 AM [facility] (on-call MD unless specified) Details          Results from last 7 days   Lab Units 11/06/22  0611   WBC 10*3/mm3 2.88*   HEMOGLOBIN g/dL 9.7*   HEMATOCRIT % 30.0*   PLATELETS 10*3/mm3 309     Results from last 7 days   Lab Units 11/06/22  0611   SODIUM mmol/L 140   POTASSIUM mmol/L 3.5   CHLORIDE mmol/L 103   CO2 mmol/L 30.0*   BUN mg/dL 13   CREATININE mg/dL 0.55*   GLUCOSE mg/dL 78   CALCIUM mg/dL 8.8     Significant Diagnostic Studies: No results found for: WBC, HGB, HCT, PLT  No results found for: NA, K, CL, CO2, BUN, CREATININE, GLUCOSE  No results found for: CALCIUM, MG, PHOS  No results found for: AST, ALT, ALKPHOS  No results found for: APTT, INR  No results found for: COLORU, CLARITYU, SPECGRAV, PHUR, PROTEINUR, GLUCOSEU, KETONESU, BLOODU, NITRITE, LEUKOCYTESUR, BILIRUBINUR, UROBILINOGEN, RBCUA, WBCUA, BACTERIA, UACOMMENT  No results found for: TROPONINT, TROPONINI, BNP  No components found for: HGBA1C;2  No  components found for: TSH;2  Imaging Results (All)     Procedure Component Value Units Date/Time    CT Bone marrow biopsy and aspiration [[id number]] Collected: 10/27/22 1202     Updated: 10/27/22 1206    Narrative:      PROCEDURE: CT guided bone marrow biopsy     HISTORY: macrocytic anemia, leukopenia; R55-Syncope and collapse;  D64.9-Anemia, unspecified; R53.1-Weakness; R71.0-Precipitous drop in  hematocrit; D61.818-Other pancytopenia; C50.011-Malignant neoplasm of  nipple and areola, right female breast     TECHNIQUE:  Radiation dose reduction techniques were utilized, including automated  exposure control and exposure modulation based on body size.     The procedural risks, benefits, and alternatives were discussed with the  patient. Informed consent was obtained.        The patient was placed in the prone position on the CT procedure table.  Axial CT scan was performed to localize the posterior right iliac crest.  The overlying skin was prepped and draped in the usual sterile fashion.  1% buffered lidocaine was used for local anesthesia.     Next, an 11 gauge On Control bone access was advanced into the posterior  right iliac crest under CT guidance. Bone marrow aspirate followed by  core biopsy was then obtained and sent to pathology. The needle was  removed and sterile dressing applied. No immediate complications.       Impression:      Technically successful CT guided bone marrow biopsy.                    Radiation dose reduction techniques were utilized, including automated  exposure control and exposure modulation based on body size.     This report was finalized on 10/27/2022 12:02 PM by Dr. Rudolph Nguyễn M.D.       XR Chest 1 View [[id number]] Collected: 10/24/22 2349     Updated: 10/24/22 4617    Narrative:      XR CHEST 1 VW-clinical: Acute mental status change, history of breast  carcinoma     FINDINGS: Cardiac size within normal limits. Calcified benign granulomas  left midlung zone. No pulmonary  edema or pleural effusion seen. There is  asymmetric sclerosis of the left first rib compared to the right,  potential bony metastasis. No pleural effusion or pulmonary edema seen.  There is a patchy area of infiltrate or atelectasis along the medial  right base, seen along the right heart border. No suspicious pulmonary  parenchymal lesion. There is a 7 mm nodule seen at the left lung base is  probably secondary to superimposition of ribs.     CONCLUSION: Infiltrate or atelectasis at the right lung base along the  cardiac border, likely right middle lobe. Old granulomatous disease left  lung. Nodule left base likely related to superimposition. Asymmetric  sclerosis left first rib compared to the right, cannot exclude bone  metastasis. PA and lateral view of the chest would be helpful when  patient's condition permits.     This report was finalized on 10/24/2022 11:54 PM by Dr. Landon Landry M.D.           Lab Results (last 7 days)     Procedure Component Value Units Date/Time    Basic Metabolic Panel [[id number]]  (Abnormal) Collected: 11/06/22 0611    Specimen: Blood Updated: 11/06/22 0714     Glucose 78 mg/dL      BUN 13 mg/dL      Creatinine 0.55 mg/dL      Sodium 140 mmol/L      Potassium 3.5 mmol/L      Chloride 103 mmol/L      CO2 30.0 mmol/L      Calcium 8.8 mg/dL      BUN/Creatinine Ratio 23.6     Anion Gap 7.0 mmol/L      eGFR 91.6 mL/min/1.73      Comment: National Kidney Foundation and American Society of Nephrology (ASN) Task Force recommended calculation based on the Chronic Kidney Disease Epidemiology Collaboration (CKD-EPI) equation refit without adjustment for race.       Narrative:      GFR Normal >60  Chronic Kidney Disease <60  Kidney Failure <15    The GFR formula is only valid for adults with stable renal function between ages 18 and 70.    CBC & Differential [[id number]]  (Abnormal) Collected: 11/06/22 0611    Specimen: Blood Updated: 11/06/22 0658    Narrative:      The following orders were  created for panel order CBC & Differential.  Procedure                               Abnormality         Status                     ---------                               -----------         ------                     CBC Auto Differential[[id number]]        Abnormal            Final result                 Please view results for these tests on the individual orders.    CBC Auto Differential [[id number]]  (Abnormal) Collected: 11/06/22 0611    Specimen: Blood Updated: 11/06/22 0658     WBC 2.88 10*3/mm3      RBC 3.14 10*6/mm3      Hemoglobin 9.7 g/dL      Hematocrit 30.0 %      MCV 95.5 fL      MCH 30.9 pg      MCHC 32.3 g/dL      RDW 15.5 %      RDW-SD 53.6 fl      MPV 9.2 fL      Platelets 309 10*3/mm3      Neutrophil % 38.2 %      Lymphocyte % 40.3 %      Monocyte % 14.6 %      Eosinophil % 5.9 %      Basophil % 1.0 %      Immature Grans % 0.0 %      Neutrophils, Absolute 1.10 10*3/mm3      Lymphocytes, Absolute 1.16 10*3/mm3      Monocytes, Absolute 0.42 10*3/mm3      Eosinophils, Absolute 0.17 10*3/mm3      Basophils, Absolute 0.03 10*3/mm3      Immature Grans, Absolute 0.00 10*3/mm3      nRBC 0.0 /100 WBC     Basic Metabolic Panel [[id number]]  (Abnormal) Collected: 11/05/22 0645    Specimen: Blood Updated: 11/05/22 0735     Glucose 82 mg/dL      BUN 11 mg/dL      Creatinine 0.55 mg/dL      Sodium 141 mmol/L      Potassium 3.5 mmol/L      Chloride 104 mmol/L      CO2 29.1 mmol/L      Calcium 8.6 mg/dL      BUN/Creatinine Ratio 20.0     Anion Gap 7.9 mmol/L      eGFR 91.6 mL/min/1.73      Comment: National Kidney Foundation and American Society of Nephrology (ASN) Task Force recommended calculation based on the Chronic Kidney Disease Epidemiology Collaboration (CKD-EPI) equation refit without adjustment for race.       Narrative:      GFR Normal >60  Chronic Kidney Disease <60  Kidney Failure <15    The GFR formula is only valid for adults with stable renal function between ages 18 and 70.    CBC & Differential  [[id number]]  (Abnormal) Collected: 11/05/22 0645    Specimen: Blood Updated: 11/05/22 0720    Narrative:      The following orders were created for panel order CBC & Differential.  Procedure                               Abnormality         Status                     ---------                               -----------         ------                     CBC Auto Differential[[id number]]        Abnormal            Final result                 Please view results for these tests on the individual orders.    CBC Auto Differential [[id number]]  (Abnormal) Collected: 11/05/22 0645    Specimen: Blood Updated: 11/05/22 0720     WBC 3.09 10*3/mm3      RBC 3.11 10*6/mm3      Hemoglobin 9.8 g/dL      Hematocrit 29.7 %      MCV 95.5 fL      MCH 31.5 pg      MCHC 33.0 g/dL      RDW 15.5 %      RDW-SD 52.6 fl      MPV 9.0 fL      Platelets 305 10*3/mm3      Neutrophil % 37.8 %      Lymphocyte % 39.5 %      Monocyte % 15.2 %      Eosinophil % 6.5 %      Basophil % 1.0 %      Immature Grans % 0.0 %      Neutrophils, Absolute 1.17 10*3/mm3      Lymphocytes, Absolute 1.22 10*3/mm3      Monocytes, Absolute 0.47 10*3/mm3      Eosinophils, Absolute 0.20 10*3/mm3      Basophils, Absolute 0.03 10*3/mm3      Immature Grans, Absolute 0.00 10*3/mm3      nRBC 0.0 /100 WBC     Basic Metabolic Panel [[id number]]  (Normal) Collected: 11/04/22 0613    Specimen: Blood Updated: 11/04/22 0708     Glucose 84 mg/dL      BUN 14 mg/dL      Creatinine 0.57 mg/dL      Sodium 140 mmol/L      Potassium 3.6 mmol/L      Chloride 103 mmol/L      CO2 25.4 mmol/L      Calcium 8.8 mg/dL      BUN/Creatinine Ratio 24.6     Anion Gap 11.6 mmol/L      eGFR 90.9 mL/min/1.73      Comment: National Kidney Foundation and American Society of Nephrology (ASN) Task Force recommended calculation based on the Chronic Kidney Disease Epidemiology Collaboration (CKD-EPI) equation refit without adjustment for race.       Narrative:      GFR Normal >60  Chronic Kidney Disease  <60  Kidney Failure <15    The GFR formula is only valid for adults with stable renal function between ages 18 and 70.    CBC & Differential [[id number]]  (Abnormal) Collected: 11/04/22 0613    Specimen: Blood Updated: 11/04/22 0651    Narrative:      The following orders were created for panel order CBC & Differential.  Procedure                               Abnormality         Status                     ---------                               -----------         ------                     CBC Auto Differential[[id number]]        Abnormal            Final result                 Please view results for these tests on the individual orders.    CBC Auto Differential [[id number]]  (Abnormal) Collected: 11/04/22 0613    Specimen: Blood Updated: 11/04/22 0651     WBC 3.21 10*3/mm3      RBC 3.22 10*6/mm3      Hemoglobin 9.9 g/dL      Hematocrit 30.1 %      MCV 93.5 fL      MCH 30.7 pg      MCHC 32.9 g/dL      RDW 15.3 %      RDW-SD 52.4 fl      MPV 8.9 fL      Platelets 306 10*3/mm3      Neutrophil % 40.5 %      Lymphocyte % 39.9 %      Monocyte % 13.1 %      Eosinophil % 5.3 %      Basophil % 0.9 %      Immature Grans % 0.3 %      Neutrophils, Absolute 1.30 10*3/mm3      Lymphocytes, Absolute 1.28 10*3/mm3      Monocytes, Absolute 0.42 10*3/mm3      Eosinophils, Absolute 0.17 10*3/mm3      Basophils, Absolute 0.03 10*3/mm3      Immature Grans, Absolute 0.01 10*3/mm3      nRBC 0.0 /100 WBC     Basic Metabolic Panel [[id number]]  (Normal) Collected: 11/03/22 0507    Specimen: Blood Updated: 11/03/22 0556     Glucose 85 mg/dL      BUN 11 mg/dL      Creatinine 0.63 mg/dL      Sodium 139 mmol/L      Potassium 3.8 mmol/L      Chloride 104 mmol/L      CO2 27.0 mmol/L      Calcium 8.6 mg/dL      BUN/Creatinine Ratio 17.5     Anion Gap 8.0 mmol/L      eGFR 88.7 mL/min/1.73      Comment: National Kidney Foundation and American Society of Nephrology (ASN) Task Force recommended calculation based on the Chronic Kidney Disease  "Epidemiology Collaboration (CKD-EPI) equation refit without adjustment for race.       Narrative:      GFR Normal >60  Chronic Kidney Disease <60  Kidney Failure <15    The GFR formula is only valid for adults with stable renal function between ages 18 and 70.    CBC & Differential [[id number]]  (Abnormal) Collected: 11/03/22 0507    Specimen: Blood Updated: 11/03/22 0541    Narrative:      The following orders were created for panel order CBC & Differential.  Procedure                               Abnormality         Status                     ---------                               -----------         ------                     CBC Auto Differential[[id number]]        Abnormal            Final result                 Please view results for these tests on the individual orders.    CBC Auto Differential [[id number]]  (Abnormal) Collected: 11/03/22 0507    Specimen: Blood Updated: 11/03/22 0541     WBC 3.46 10*3/mm3      RBC 3.22 10*6/mm3      Hemoglobin 10.1 g/dL      Hematocrit 30.8 %      MCV 95.7 fL      MCH 31.4 pg      MCHC 32.8 g/dL      RDW 15.8 %      RDW-SD 55.3 fl      MPV 8.6 fL      Platelets 313 10*3/mm3      Neutrophil % 38.4 %      Lymphocyte % 41.6 %      Monocyte % 13.3 %      Eosinophil % 5.5 %      Basophil % 0.9 %      Immature Grans % 0.3 %      Neutrophils, Absolute 1.33 10*3/mm3      Lymphocytes, Absolute 1.44 10*3/mm3      Monocytes, Absolute 0.46 10*3/mm3      Eosinophils, Absolute 0.19 10*3/mm3      Basophils, Absolute 0.03 10*3/mm3      Immature Grans, Absolute 0.01 10*3/mm3      nRBC 0.0 /100 WBC         /92 (BP Location: Right arm, Patient Position: Lying)   Pulse 76   Temp 98.2 °F (36.8 °C) (Oral)   Resp 16   Ht 172.7 cm (68\")   Wt 69.9 kg (154 lb 1.6 oz)   SpO2 95%   BMI 23.43 kg/m²     Discharge Exam:  General Appearance:    Alert, cooperative, no distress                          Head:    Normocephalic, without obvious abnormality, atraumatic                          Eyes: "                            Throat:   Lips, tongue, gums normal                          Neck:   Supple, symmetrical, trachea midline, no JVD                        Lungs:     Clear to auscultation bilaterally, respirations unlabored                Chest Wall:    No tenderness or deformity                        Heart:    Regular rate and rhythm, S1 and S2 normal, no murmur,no  Rub  or gallop                  Abdomen:     Soft, non-tender, bowel sounds active, no masses, no organomegaly                  Extremities:   Extremities normal, atraumatic, no cyanosis or edema                             Skin:   Skin is warm and dry,  no rashes or palpable lesions                  Neurologic:   no focal deficits noted     Disposition:  Home with home health    Activity as tolerated    Diet as tolerated  Diet Order   Procedures   • Diet Regular       Patient Instructions:      Discharge Medications      New Medications      Instructions Start Date   fludrocortisone 0.1 MG tablet   0.1 mg, Oral, Daily   Start Date: November 10, 2022        Continue These Medications      Instructions Start Date   atorvastatin 40 MG tablet  Commonly known as: LIPITOR   Oral, Daily      buPROPion  MG 24 hr tablet  Commonly known as: WELLBUTRIN XL   150 mg, Oral      folic acid 1 MG tablet  Commonly known as: FOLVITE   1,000 mcg, Oral, Daily      levothyroxine 100 MCG tablet  Commonly known as: SYNTHROID, LEVOTHROID   Oral, Daily      mirtazapine 30 MG tablet  Commonly known as: REMERON   30 mg, Oral, Every Night at Bedtime      venlafaxine XR 75 MG 24 hr capsule  Commonly known as: EFFEXOR-XR   Oral, Daily      Vitamin D (Cholecalciferol) 10 MCG (400 UNIT) tablet  Commonly known as: CHOLECALCIFEROL   2,000 Units, Oral, Daily         Stop These Medications    amLODIPine 5 MG tablet  Commonly known as: NORVASC     KLOR-CON 10 MEQ CR tablet  Generic drug: potassium chloride     metoprolol tartrate 25 MG tablet  Commonly known as: LOPRESSOR      rosuvastatin 20 MG tablet  Commonly known as: CRESTOR     triamterene-hydrochlorothiazide 37.5-25 MG per capsule  Commonly known as: DYAZIDE          Future Appointments   Date Time Provider Department Center   11/14/2022  8:10 AM LAB CHAIR 5 [facility]   11/14/2022  8:40 AM Donald Knox MD [facility]   12/12/2022 12:30 PM LAB CHAIR 1 [facility]   12/12/2022  1:00 PM Donald Knox MD [facility]   12/12/2022  1:30 PM INJECTION CHAIR [facility]     Additional Instructions for the Follow-ups that You Need to Schedule     Ambulatory Referral to Home Health   As directed      Face to Face Visit Date: 10/29/2022    Follow-up provider for Plan of Care?: I treated the patient in an acute care facility and will not continue treatment after discharge.    Follow-up provider: MADELINE FARR [[id number]]    Reason/Clinical Findings: generalized weakness, orthostatic hypotension    Describe mobility limitations that make leaving home difficult: generalized weakness, orthostatic hypotension    Nursing/Therapeutic Services Requested: Physical Therapy Skilled Nursing    Skilled nursing orders: Cardiopulmonary assessments    PT orders: Strengthening Home safety assessment    Frequency: 1 Week 1         Ambulatory Referral to Home Health ([facility])   As directed      Face to Face Visit Date: 11/9/2022    Follow-up provider for Plan of Care?: I treated the patient in an acute care facility and will not continue treatment after discharge.    Follow-up provider: MADELINE FARR [[id number]]    Reason/Clinical Findings: weak    Describe mobility limitations that make leaving home difficult: weakness    Nursing/Therapeutic Services Requested: Skilled Nursing Physical Therapy Occupational Therapy    Skilled nursing orders: Other    PT orders: Therapeutic exercise    Occupational orders: Strengthening    Frequency: 1 Week 1            Contact information for  follow-up providers     Roseanne Sanchez MD Follow up in 1 week(s).    Specialty: Family Medicine  Contact information:  [address]  [address]  [facility] [address]  [phone]             Roseanne Sanchez MD Follow up.    Specialty: Family Medicine  Contact information:  [address]  [address]  [facility] [address]  [phone]                   Contact information for after-discharge care     Destination     [facility] .    Service: Skilled Nursing  Contact information:  [address] [facility] [address] [phone]                           Discharge Order (From admission, onward)     Start     Ordered    11/09/22 1425  Discharge patient  Once        Expected Discharge Date: 11/09/22    Discharge Disposition: Home-Health Care [facility]    Physician of Record for Attribution - Please select from Treatment Team: DEBRA MENDIETA [[id number]]    Review needed by CMO to determine Physician of Record: No       Question Answer Comment   Physician of Record for Attribution - Please select from Treatment Team DEBRA MENDIETA    Review needed by CMO to determine Physician of Record No        11/09/22 1429    10/29/22 1142  Discharge patient  Once,   Status:  Canceled        Comments: Pending repeat physical therapy evaluation   Expected Discharge Date: 10/29/22    Expected Discharge Time: Midday    Discharge Disposition: Home or Self Care    Physician of Record for Attribution - Please select from Treatment Team: TAWNYA WALLACE [[id number]]    Review needed by CMO to determine Physician of Record: No       Question Answer Comment   Physician of Record for Attribution - Please select from Treatment Team TAWNYA WALLACE    Review needed by CMO to determine Physician of Record No        10/29/22 1141                Total time spent discharging patient including evaluation,post hospitalization follow up,  medication and post hospitalization instructions and education total time  exceeds 30 minutes.    Signed:  Rohan Mantilla MD  11/9/2022  14:29 EST

## 2022-11-09 NOTE — PAYOR COMM NOTE
"Daniel Evans (82 y.o. Female)     PLEASE SEE ATTACHED FOR CONT INPT STAY DAYS    REF #  [id number]      PLEASE CALL HEATHER @ [phone] OR [phone]    THANK YOU    Date of Birth   1940    Social Security Number       Address   [address] [place] [address]    Home Phone   [phone]    MRN   [id number]       Mormon   Unknown    Marital Status                               Admission Date   10/25/22    Admission Type   Emergency    Admitting Provider   Lazarus Morgan MD    Attending Provider   Rohan Mantilla MD    Department, Room/Bed   [address], [address]       Discharge Date       Discharge Disposition       Discharge Destination                               Attending Provider: Rohan Mantilla MD    Allergies: Ciprofloxacin    Isolation: None   Infection: None   Code Status: CPR    Ht: 172.7 cm (68\")   Wt: 69.9 kg (154 lb 1.6 oz)    Admission Cmt: None   Principal Problem: Near syncope [R55]                 Active Insurance as of 10/25/2022     Primary Coverage     Payor Plan Insurance Group Employer/Plan Group    AETNA MEDICARE REPLACEMENT AETNA MEDICARE REPLACEMENT [id number]     Payor Plan Address Payor Plan Phone Number Payor Plan Fax Number Effective Dates    [address] [phone]  1/1/2022 - None Entered    [id number] [id number]       Subscriber Name Subscriber Birth Date Member ID       DANIEL EVANS 1940 [id number]                 Emergency Contacts      (Rel.) Home Phone Work Phone Mobile Phone    Sami Evans (Spouse) [phone] -- [phone]    Brianna Zuleta (Daughter) [phone] -- --    YingDavid myersEli -- -- [phone]            [place]: NPI [id number]  Tax ID [id number]     Physician Progress Notes (last 48 hours)      Rohan Mantilla MD at 11/08/22 1638          DAILY PROGRESS NOTE  [facility]    Patient Identification:  Name: Daniel RM " "Willinger  Age: 82 y.o.  Sex: female  :  1940  MRN: [id number]         Primary Care Physician: Roseanne Sanchez MD    Subjective:  Interval History:No new complaints.    Objective:    Scheduled Meds:acetaminophen, 650 mg, Oral, Once  atorvastatin, 40 mg, Oral, Daily  buPROPion XL, 150 mg, Oral, Daily  diphenhydrAMINE, 25 mg, Oral, Once  fludrocortisone, 100 mcg, Oral, Daily  folic acid, 1,000 mcg, Oral, Daily  levothyroxine, 100 mcg, Oral, Daily  mirtazapine, 30 mg, Oral, Nightly  venlafaxine XR, 75 mg, Oral, Daily      Continuous Infusions:     Vital signs in last 24 hours:  Temp:  [98.1 °F (36.7 °C)-98.5 °F (36.9 °C)] 98.3 °F (36.8 °C)  Heart Rate:  [64-75] 75  Resp:  [16-18] 18  BP: (151-177)/(81-96) 151/81    Intake/Output:    Intake/Output Summary (Last 24 hours) at 2022 1639  Last data filed at 2022 0522  Gross per 24 hour   Intake 240 ml   Output 1200 ml   Net -960 ml       Exam:  /81 (BP Location: Right arm, Patient Position: Lying)   Pulse 75   Temp 98.3 °F (36.8 °C) (Oral)   Resp 18   Ht 172.7 cm (68\")   Wt 69.9 kg (154 lb 1.6 oz)   SpO2 94%   BMI 23.43 kg/m²     General Appearance:    Alert, cooperative, no distress   Head:    Normocephalic, without obvious abnormality, atraumatic   Eyes:       Throat:   Lips, tongue, gums normal   Neck:   Supple, symmetrical, trachea midline, no JVD   Lungs:     Clear to auscultation bilaterally, respirations unlabored   Chest Wall:    No tenderness or deformity    Heart:    Regular rate and rhythm, S1 and S2 normal, no murmur,no  Rub or gallop   Abdomen:     Soft, nontender, bowel sounds active, no masses, no organomegaly    Extremities:   Extremities normal, atraumatic, no cyanosis or edema   Pulses:      Skin:   Skin is warm and dry,  no rashes or palpable lesions   Neurologic:   no focal deficits noted      Lab Results (last 72 hours)     Procedure Component Value Units Date/Time    Basic Metabolic Panel [[id number]]  (Abnormal) " Collected: 11/06/22 0611    Specimen: Blood Updated: 11/06/22 0714     Glucose 78 mg/dL      BUN 13 mg/dL      Creatinine 0.55 mg/dL      Sodium 140 mmol/L      Potassium 3.5 mmol/L      Chloride 103 mmol/L      CO2 30.0 mmol/L      Calcium 8.8 mg/dL      BUN/Creatinine Ratio 23.6     Anion Gap 7.0 mmol/L      eGFR 91.6 mL/min/1.73      Comment: National Kidney Foundation and American Society of Nephrology (ASN) Task Force recommended calculation based on the Chronic Kidney Disease Epidemiology Collaboration (CKD-EPI) equation refit without adjustment for race.       Narrative:      GFR Normal >60  Chronic Kidney Disease <60  Kidney Failure <15    The GFR formula is only valid for adults with stable renal function between ages 18 and 70.    CBC & Differential [[id number]]  (Abnormal) Collected: 11/06/22 0611    Specimen: Blood Updated: 11/06/22 0658    Narrative:      The following orders were created for panel order CBC & Differential.  Procedure                               Abnormality         Status                     ---------                               -----------         ------                     CBC Auto Differential[[id number]]        Abnormal            Final result                 Please view results for these tests on the individual orders.    CBC Auto Differential [[id number]]  (Abnormal) Collected: 11/06/22 0611    Specimen: Blood Updated: 11/06/22 0658     WBC 2.88 10*3/mm3      RBC 3.14 10*6/mm3      Hemoglobin 9.7 g/dL      Hematocrit 30.0 %      MCV 95.5 fL      MCH 30.9 pg      MCHC 32.3 g/dL      RDW 15.5 %      RDW-SD 53.6 fl      MPV 9.2 fL      Platelets 309 10*3/mm3      Neutrophil % 38.2 %      Lymphocyte % 40.3 %      Monocyte % 14.6 %      Eosinophil % 5.9 %      Basophil % 1.0 %      Immature Grans % 0.0 %      Neutrophils, Absolute 1.10 10*3/mm3      Lymphocytes, Absolute 1.16 10*3/mm3      Monocytes, Absolute 0.42 10*3/mm3      Eosinophils, Absolute 0.17 10*3/mm3      Basophils,  Absolute 0.03 10*3/mm3      Immature Grans, Absolute 0.00 10*3/mm3      nRBC 0.0 /100 WBC     Basic Metabolic Panel [[id number]]  (Abnormal) Collected: 11/05/22 0645    Specimen: Blood Updated: 11/05/22 0735     Glucose 82 mg/dL      BUN 11 mg/dL      Creatinine 0.55 mg/dL      Sodium 141 mmol/L      Potassium 3.5 mmol/L      Chloride 104 mmol/L      CO2 29.1 mmol/L      Calcium 8.6 mg/dL      BUN/Creatinine Ratio 20.0     Anion Gap 7.9 mmol/L      eGFR 91.6 mL/min/1.73      Comment: National Kidney Foundation and American Society of Nephrology (ASN) Task Force recommended calculation based on the Chronic Kidney Disease Epidemiology Collaboration (CKD-EPI) equation refit without adjustment for race.       Narrative:      GFR Normal >60  Chronic Kidney Disease <60  Kidney Failure <15    The GFR formula is only valid for adults with stable renal function between ages 18 and 70.    CBC & Differential [[id number]]  (Abnormal) Collected: 11/05/22 0645    Specimen: Blood Updated: 11/05/22 0720    Narrative:      The following orders were created for panel order CBC & Differential.  Procedure                               Abnormality         Status                     ---------                               -----------         ------                     CBC Auto Differential[[id number]]        Abnormal            Final result                 Please view results for these tests on the individual orders.    CBC Auto Differential [[id number]]  (Abnormal) Collected: 11/05/22 0645    Specimen: Blood Updated: 11/05/22 0720     WBC 3.09 10*3/mm3      RBC 3.11 10*6/mm3      Hemoglobin 9.8 g/dL      Hematocrit 29.7 %      MCV 95.5 fL      MCH 31.5 pg      MCHC 33.0 g/dL      RDW 15.5 %      RDW-SD 52.6 fl      MPV 9.0 fL      Platelets 305 10*3/mm3      Neutrophil % 37.8 %      Lymphocyte % 39.5 %      Monocyte % 15.2 %      Eosinophil % 6.5 %      Basophil % 1.0 %      Immature Grans % 0.0 %      Neutrophils, Absolute 1.17 10*3/mm3       Lymphocytes, Absolute 1.22 10*3/mm3      Monocytes, Absolute 0.47 10*3/mm3      Eosinophils, Absolute 0.20 10*3/mm3      Basophils, Absolute 0.03 10*3/mm3      Immature Grans, Absolute 0.00 10*3/mm3      nRBC 0.0 /100 WBC         Data Review:  Results from last 7 days   Lab Units 11/06/22  0611 11/05/22  0645 11/04/22 0613   SODIUM mmol/L 140 141 140   POTASSIUM mmol/L 3.5 3.5 3.6   CHLORIDE mmol/L 103 104 103   CO2 mmol/L 30.0* 29.1* 25.4   BUN mg/dL 13 11 14   CREATININE mg/dL 0.55* 0.55* 0.57   GLUCOSE mg/dL 78 82 84   CALCIUM mg/dL 8.8 8.6 8.8     Results from last 7 days   Lab Units 11/06/22  0611 11/05/22  0645 11/04/22  0613   WBC 10*3/mm3 2.88* 3.09* 3.21*   HEMOGLOBIN g/dL 9.7* 9.8* 9.9*   HEMATOCRIT % 30.0* 29.7* 30.1*   PLATELETS 10*3/mm3 309 305 306             Lab Results   Lab Value Date/Time    TROPONINT <0.010 10/25/2022 0303               Invalid input(s): PROT, LABALBU          No results found for: POCGLU        Past Medical History:   Diagnosis Date   • Anxiety    • Cancer (HCC)     Stage I, right breast cancer   • Depression    • Disease of thyroid gland     hypothyroidusm   • Erythrocytosis    • Hypercholesterolemia    • Hypertension    • Osteoporosis     osteopenia mild       Assessment:  Active Hospital Problems    Diagnosis  POA   • **Near syncope [R55]  Yes   • Other neutropenia (HCC) [D70.8]  Yes   • Orthostatic hypotension [I95.1]  Yes   • Hypothyroidism (acquired) [E03.9]  Yes   • HTN (hypertension) [I10]  Yes   • Hypokalemia [E87.6]  Yes   • Symptomatic anemia [D64.9]  Yes      Resolved Hospital Problems    Diagnosis Date Resolved POA   • Hyponatremia [E87.1] 10/29/2022 Yes       Plan:  Continue current RX. Follow lab. Will need SNU for rehab.  Await PreCert.    Rohan Mantilla MD  11/8/2022  16:39 EST    Electronically signed by Rohan Mantilla MD at 11/08/22 1639     Rohan Mantilla MD at 11/07/22 1406          DAILY PROGRESS NOTE  [facility]    Patient  "Identification:  Name: Rekha Campos  Age: 82 y.o.  Sex: female  :  1940  MRN: [id number]         Primary Care Physician: Roseanne Sanchez MD    Subjective:  Interval History:No new complaints.    Objective:    Scheduled Meds:acetaminophen, 650 mg, Oral, Once  atorvastatin, 40 mg, Oral, Daily  buPROPion XL, 150 mg, Oral, Daily  diphenhydrAMINE, 25 mg, Oral, Once  fludrocortisone, 100 mcg, Oral, Daily  folic acid, 1,000 mcg, Oral, Daily  levothyroxine, 100 mcg, Oral, Daily  mirtazapine, 30 mg, Oral, Nightly  venlafaxine XR, 75 mg, Oral, Daily      Continuous Infusions:     Vital signs in last 24 hours:  Temp:  [98 °F (36.7 °C)-98.9 °F (37.2 °C)] 98.4 °F (36.9 °C)  Heart Rate:  [66-74] 71  Resp:  [16-18] 18  BP: ()/(72-98) 165/83    Intake/Output:    Intake/Output Summary (Last 24 hours) at 2022 1409  Last data filed at 2022 0500  Gross per 24 hour   Intake --   Output 3200 ml   Net -3200 ml       Exam:  /83 (BP Location: Right arm, Patient Position: Lying)   Pulse 71   Temp 98.4 °F (36.9 °C) (Oral)   Resp 18   Ht 172.7 cm (68\")   Wt 68.8 kg (151 lb 11.2 oz)   SpO2 91%   BMI 23.07 kg/m²     General Appearance:    Alert, cooperative, no distress   Head:    Normocephalic, without obvious abnormality, atraumatic   Eyes:       Throat:   Lips, tongue, gums normal   Neck:   Supple, symmetrical, trachea midline, no JVD   Lungs:     Clear to auscultation bilaterally, respirations unlabored   Chest Wall:    No tenderness or deformity    Heart:    Regular rate and rhythm, S1 and S2 normal, no murmur,no  Rub or gallop   Abdomen:     Soft, nontender, bowel sounds active, no masses, no organomegaly    Extremities:   Extremities normal, atraumatic, no cyanosis or edema   Pulses:      Skin:   Skin is warm and dry,  no rashes or palpable lesions   Neurologic:   no focal deficits noted      Lab Results (last 72 hours)     Procedure Component Value Units Date/Time    Basic Metabolic Panel " [[id number]]  (Abnormal) Collected: 11/06/22 0611    Specimen: Blood Updated: 11/06/22 0714     Glucose 78 mg/dL      BUN 13 mg/dL      Creatinine 0.55 mg/dL      Sodium 140 mmol/L      Potassium 3.5 mmol/L      Chloride 103 mmol/L      CO2 30.0 mmol/L      Calcium 8.8 mg/dL      BUN/Creatinine Ratio 23.6     Anion Gap 7.0 mmol/L      eGFR 91.6 mL/min/1.73      Comment: National Kidney Foundation and American Society of Nephrology (ASN) Task Force recommended calculation based on the Chronic Kidney Disease Epidemiology Collaboration (CKD-EPI) equation refit without adjustment for race.       Narrative:      GFR Normal >60  Chronic Kidney Disease <60  Kidney Failure <15    The GFR formula is only valid for adults with stable renal function between ages 18 and 70.    CBC & Differential [[id number]]  (Abnormal) Collected: 11/06/22 0611    Specimen: Blood Updated: 11/06/22 0658    Narrative:      The following orders were created for panel order CBC & Differential.  Procedure                               Abnormality         Status                     ---------                               -----------         ------                     CBC Auto Differential[[id number]]        Abnormal            Final result                 Please view results for these tests on the individual orders.    CBC Auto Differential [[id number]]  (Abnormal) Collected: 11/06/22 0611    Specimen: Blood Updated: 11/06/22 0658     WBC 2.88 10*3/mm3      RBC 3.14 10*6/mm3      Hemoglobin 9.7 g/dL      Hematocrit 30.0 %      MCV 95.5 fL      MCH 30.9 pg      MCHC 32.3 g/dL      RDW 15.5 %      RDW-SD 53.6 fl      MPV 9.2 fL      Platelets 309 10*3/mm3      Neutrophil % 38.2 %      Lymphocyte % 40.3 %      Monocyte % 14.6 %      Eosinophil % 5.9 %      Basophil % 1.0 %      Immature Grans % 0.0 %      Neutrophils, Absolute 1.10 10*3/mm3      Lymphocytes, Absolute 1.16 10*3/mm3      Monocytes, Absolute 0.42 10*3/mm3      Eosinophils, Absolute 0.17  10*3/mm3      Basophils, Absolute 0.03 10*3/mm3      Immature Grans, Absolute 0.00 10*3/mm3      nRBC 0.0 /100 WBC     Basic Metabolic Panel [[id number]]  (Abnormal) Collected: 11/05/22 0645    Specimen: Blood Updated: 11/05/22 0735     Glucose 82 mg/dL      BUN 11 mg/dL      Creatinine 0.55 mg/dL      Sodium 141 mmol/L      Potassium 3.5 mmol/L      Chloride 104 mmol/L      CO2 29.1 mmol/L      Calcium 8.6 mg/dL      BUN/Creatinine Ratio 20.0     Anion Gap 7.9 mmol/L      eGFR 91.6 mL/min/1.73      Comment: National Kidney Foundation and American Society of Nephrology (ASN) Task Force recommended calculation based on the Chronic Kidney Disease Epidemiology Collaboration (CKD-EPI) equation refit without adjustment for race.       Narrative:      GFR Normal >60  Chronic Kidney Disease <60  Kidney Failure <15    The GFR formula is only valid for adults with stable renal function between ages 18 and 70.    CBC & Differential [[id number]]  (Abnormal) Collected: 11/05/22 0645    Specimen: Blood Updated: 11/05/22 0720    Narrative:      The following orders were created for panel order CBC & Differential.  Procedure                               Abnormality         Status                     ---------                               -----------         ------                     CBC Auto Differential[[id number]]        Abnormal            Final result                 Please view results for these tests on the individual orders.    CBC Auto Differential [[id number]]  (Abnormal) Collected: 11/05/22 0645    Specimen: Blood Updated: 11/05/22 0720     WBC 3.09 10*3/mm3      RBC 3.11 10*6/mm3      Hemoglobin 9.8 g/dL      Hematocrit 29.7 %      MCV 95.5 fL      MCH 31.5 pg      MCHC 33.0 g/dL      RDW 15.5 %      RDW-SD 52.6 fl      MPV 9.0 fL      Platelets 305 10*3/mm3      Neutrophil % 37.8 %      Lymphocyte % 39.5 %      Monocyte % 15.2 %      Eosinophil % 6.5 %      Basophil % 1.0 %      Immature Grans % 0.0 %      Neutrophils,  Absolute 1.17 10*3/mm3      Lymphocytes, Absolute 1.22 10*3/mm3      Monocytes, Absolute 0.47 10*3/mm3      Eosinophils, Absolute 0.20 10*3/mm3      Basophils, Absolute 0.03 10*3/mm3      Immature Grans, Absolute 0.00 10*3/mm3      nRBC 0.0 /100 WBC         Data Review:  Results from last 7 days   Lab Units 11/06/22  0611 11/05/22  0645 11/04/22  0613   SODIUM mmol/L 140 141 140   POTASSIUM mmol/L 3.5 3.5 3.6   CHLORIDE mmol/L 103 104 103   CO2 mmol/L 30.0* 29.1* 25.4   BUN mg/dL 13 11 14   CREATININE mg/dL 0.55* 0.55* 0.57   GLUCOSE mg/dL 78 82 84   CALCIUM mg/dL 8.8 8.6 8.8     Results from last 7 days   Lab Units 11/06/22  0611 11/05/22  0645 11/04/22  0613   WBC 10*3/mm3 2.88* 3.09* 3.21*   HEMOGLOBIN g/dL 9.7* 9.8* 9.9*   HEMATOCRIT % 30.0* 29.7* 30.1*   PLATELETS 10*3/mm3 309 305 306             Lab Results   Lab Value Date/Time    TROPONINT <0.010 10/25/2022 0303               Invalid input(s): PROT, LABALBU          No results found for: POCGLU        Past Medical History:   Diagnosis Date   • Anxiety    • Cancer (HCC)     Stage I, right breast cancer   • Depression    • Disease of thyroid gland     hypothyroidusm   • Erythrocytosis    • Hypercholesterolemia    • Hypertension    • Osteoporosis     osteopenia mild       Assessment:  Active Hospital Problems    Diagnosis  POA   • **Near syncope [R55]  Yes   • Other neutropenia (HCC) [D70.8]  Yes   • Orthostatic hypotension [I95.1]  Yes   • Hypothyroidism (acquired) [E03.9]  Yes   • HTN (hypertension) [I10]  Yes   • Hypokalemia [E87.6]  Yes   • Symptomatic anemia [D64.9]  Yes      Resolved Hospital Problems    Diagnosis Date Resolved POA   • Hyponatremia [E87.1] 10/29/2022 Yes       Plan:  Continue current RX. Follow lab. Will need SNU for rehab.  Await PreCert.    Rohan Mantilla MD  11/7/2022  14:09 EST    Electronically signed by Rohan Mantilla MD at 11/07/22 2773

## 2022-11-09 NOTE — CASE MANAGEMENT/SOCIAL WORK
Continued Stay Note  [facility]     Patient Name: Rekha Campos  MRN: [id number]  Today's Date: 11/9/2022    Admit Date: 10/25/2022    Plan: Home with [facility] and family assist , Wanda to transport   Discharge Plan     Row Name 11/09/22 1035       Plan    Plan Home with [facility]  and family assist , Wanda to transport    Patient/Family in Agreement with Plan yes    Plan Comments Per Physcians Advisor [facility] CCP called pts daughter Wanda and provided update and she will pickup her mom this evening. She requestes Emma or Medina if available from [facility]. Notified Kiki/DAYANNA hospitalist of dc plan. Notified [facility] SN[facility] plan is home. Mariposa PEDERSEN/CCP               Discharge Codes    No documentation.               Expected Discharge Date and Time     Expected Discharge Date Expected Discharge Time    Nov 8, 2022             Dea Zavala RN

## 2022-11-10 ENCOUNTER — HOME CARE VISIT (OUTPATIENT)
Dept: HOME HEALTH SERVICES | Facility: HOME HEALTHCARE | Age: 82
End: 2022-11-10
Payer: COMMERCIAL

## 2022-11-10 PROCEDURE — G0299 HHS/HOSPICE OF RN EA 15 MIN: HCPCS

## 2022-11-10 NOTE — PAYOR COMM NOTE
"Daniel Evans (82 y.o. Female)     PLEASE SEE ATTACHED FOR NOTICE OF DC    REF [id number]    THANK YOU  HEATHER GUEVARA RN/  [phone]   [phone]      Date of Birth   1940    Social Security Number       Address   [address] [place] [address]    Home Phone   [phone]    MRN   [id number]       Cheondoism   Unknown    Marital Status                               Admission Date   10/25/22    Admission Type   Emergency    Admitting Provider   Lazarus Morgan MD    Attending Provider       Department, Room/Bed   [address], [address]/1       Discharge Date   11/9/2022    Discharge Disposition   Home-Health Care c    Discharge Destination                               Attending Provider: (none)   Allergies: Ciprofloxacin    Isolation: None   Infection: None   Code Status: Prior    Ht: 172.7 cm (68\")   Wt: 69.9 kg (154 lb 1.6 oz)    Admission Cmt: None   Principal Problem: Near syncope [R55]                 Active Insurance as of 10/25/2022     Primary Coverage     Payor Plan Insurance Group Employer/Plan Group    AETNA MEDICARE REPLACEMENT AETNA MEDICARE REPLACEMENT [id number]     Payor Plan Address Payor Plan Phone Number Payor Plan Fax Number Effective Dates    [address] [phone]  1/1/2022 - None Entered    [facility] [id number]       Subscriber Name Subscriber Birth Date Member ID       DANIEL EVANS 1940 [id number]                 Emergency Contacts      (Rel.) Home Phone Work Phone Mobile Phone    AndresSami (Spouse) [phone] -- [phone]    Brianna Zuleta (Daughter) [phone] -- --    Eli Miller -- -- [phone]            [place]: NPI [id number]  Tax ID [id number]     Discharge Summary      Rohan Mantilla MD at 11/09/22 1429                                                                             PHYSICIAN DISCHARGE SUMMARY                                                "                         [facility]    Patient Identification:  Name: Rekha Campos  Age: 82 y.o.  Sex: female  :  1940  MRN: [id number]  Primary Care Physician: Roseanne Sanchez MD    Admit date: 10/25/2022  Discharge date and time:2022  Discharged Condition: good    Discharge Diagnoses:  Active Hospital Problems    Diagnosis  POA   • **Near syncope [R55]  Yes   • Other neutropenia (HCC) [D70.8]  Yes   • Orthostatic hypotension [I95.1]  Yes   • Hypothyroidism (acquired) [E03.9]  Yes   • HTN (hypertension) [I10]  Yes   • Hypokalemia [E87.6]  Yes   • Symptomatic anemia [D64.9]  Yes      Resolved Hospital Problems    Diagnosis Date Resolved POA   • Hyponatremia [E87.1] 10/29/2022 Yes          PMHX:   Past Medical History:   Diagnosis Date   • Anxiety    • Cancer (HCC)     Stage I, right breast cancer   • Depression    • Disease of thyroid gland     hypothyroidusm   • Erythrocytosis    • Hypercholesterolemia    • Hypertension    • Osteoporosis     osteopenia mild     PSHX:   Past Surgical History:   Procedure Laterality Date   • BREAST LUMPECTOMY Right    • COLONOSCOPY     • EYE SURGERY      corneal transplant   • HYSTERECTOMY     • SHOULDER SURGERY         Hospital Course: Rekha Campos  is a 82 y.o. breast cancer, pancytopenia, HTN, HLD that presents with near syncope.  States she has several episodes of near syncope which has been ongoing for years but worse in the last 2 weeks prior to admission.  She has had 1 episode where she did complete lose consciousness but her  was able to catch her.  She denies loss of bowel or bladder control, seizure activity, known injury, injury to the head.  Symptoms are not associated with chest pain, diaphoresis or palpitations.  Patient was admitted to [facility] last week for pneumonia, treated with IV antibiotics and discharged on oral Augmentin.  She feels better from the standpoint of pneumonia and denies cough,  shortness of breath, chest pain, fever or congestion.  She was seen by hematology on the day prior to admission and noted to have progressive anemia.  She is scheduled for transfusion of PRBC.  She is not on anticoagulants and denies overt bleeding.  She denies chest pain or palpitations.  Patient was admitted to the hospital and seen by cardiology and oncology.  Patient had adjustment of her medicine and Florinef was added.  Several blood pressure medicines were stopped.  She was feeling better but still little weak.  Plan is to go home with home health services.  She will follow-up with her primary care in 1 week for ongoing care.      Consults:     Consults     Date and Time Order Name Status Description    10/31/2022  2:48 PM Inpatient Cardiology Consult Completed     10/25/2022 10:31 AM Hematology & Oncology Inpatient Consult      10/25/2022  6:40 AM [facility] (on-call MD unless specified) Details          Results from last 7 days   Lab Units 11/06/22  0611   WBC 10*3/mm3 2.88*   HEMOGLOBIN g/dL 9.7*   HEMATOCRIT % 30.0*   PLATELETS 10*3/mm3 309     Results from last 7 days   Lab Units 11/06/22  0611   SODIUM mmol/L 140   POTASSIUM mmol/L 3.5   CHLORIDE mmol/L 103   CO2 mmol/L 30.0*   BUN mg/dL 13   CREATININE mg/dL 0.55*   GLUCOSE mg/dL 78   CALCIUM mg/dL 8.8     Significant Diagnostic Studies: No results found for: WBC, HGB, HCT, PLT  No results found for: NA, K, CL, CO2, BUN, CREATININE, GLUCOSE  No results found for: CALCIUM, MG, PHOS  No results found for: AST, ALT, ALKPHOS  No results found for: APTT, INR  No results found for: COLORU, CLARITYU, SPECGRAV, PHUR, PROTEINUR, GLUCOSEU, KETONESU, BLOODU, NITRITE, LEUKOCYTESUR, BILIRUBINUR, UROBILINOGEN, RBCUA, WBCUA, BACTERIA, UACOMMENT  No results found for: TROPONINT, TROPONINI, BNP  No components found for: HGBA1C;2  No components found for: TSH;2  Imaging Results (All)     Procedure Component Value Units Date/Time    CT Bone marrow biopsy and aspiration  [[id number]] Collected: 10/27/22 1202     Updated: 10/27/22 1206    Narrative:      PROCEDURE: CT guided bone marrow biopsy     HISTORY: macrocytic anemia, leukopenia; R55-Syncope and collapse;  D64.9-Anemia, unspecified; R53.1-Weakness; R71.0-Precipitous drop in  hematocrit; D61.818-Other pancytopenia; C50.011-Malignant neoplasm of  nipple and areola, right female breast     TECHNIQUE:  Radiation dose reduction techniques were utilized, including automated  exposure control and exposure modulation based on body size.     The procedural risks, benefits, and alternatives were discussed with the  patient. Informed consent was obtained.        The patient was placed in the prone position on the CT procedure table.  Axial CT scan was performed to localize the posterior right iliac crest.  The overlying skin was prepped and draped in the usual sterile fashion.  1% buffered lidocaine was used for local anesthesia.     Next, an 11 gauge On Control bone access was advanced into the posterior  right iliac crest under CT guidance. Bone marrow aspirate followed by  core biopsy was then obtained and sent to pathology. The needle was  removed and sterile dressing applied. No immediate complications.       Impression:      Technically successful CT guided bone marrow biopsy.                    Radiation dose reduction techniques were utilized, including automated  exposure control and exposure modulation based on body size.     This report was finalized on 10/27/2022 12:02 PM by Dr. Rudolph Nguyễn M.D.       XR Chest 1 View [[id number]] Collected: 10/24/22 2349     Updated: 10/24/22 2357    Narrative:      XR CHEST 1 VW-clinical: Acute mental status change, history of breast  carcinoma     FINDINGS: Cardiac size within normal limits. Calcified benign granulomas  left midlung zone. No pulmonary edema or pleural effusion seen. There is  asymmetric sclerosis of the left first rib compared to the right,  potential bony metastasis. No  pleural effusion or pulmonary edema seen.  There is a patchy area of infiltrate or atelectasis along the medial  right base, seen along the right heart border. No suspicious pulmonary  parenchymal lesion. There is a 7 mm nodule seen at the left lung base is  probably secondary to superimposition of ribs.     CONCLUSION: Infiltrate or atelectasis at the right lung base along the  cardiac border, likely right middle lobe. Old granulomatous disease left  lung. Nodule left base likely related to superimposition. Asymmetric  sclerosis left first rib compared to the right, cannot exclude bone  metastasis. PA and lateral view of the chest would be helpful when  patient's condition permits.     This report was finalized on 10/24/2022 11:54 PM by Dr. Landon Landry M.D.           Lab Results (last 7 days)     Procedure Component Value Units Date/Time    Basic Metabolic Panel [[id number]]  (Abnormal) Collected: 11/06/22 0611    Specimen: Blood Updated: 11/06/22 0714     Glucose 78 mg/dL      BUN 13 mg/dL      Creatinine 0.55 mg/dL      Sodium 140 mmol/L      Potassium 3.5 mmol/L      Chloride 103 mmol/L      CO2 30.0 mmol/L      Calcium 8.8 mg/dL      BUN/Creatinine Ratio 23.6     Anion Gap 7.0 mmol/L      eGFR 91.6 mL/min/1.73      Comment: National Kidney Foundation and American Society of Nephrology (ASN) Task Force recommended calculation based on the Chronic Kidney Disease Epidemiology Collaboration (CKD-EPI) equation refit without adjustment for race.       Narrative:      GFR Normal >60  Chronic Kidney Disease <60  Kidney Failure <15    The GFR formula is only valid for adults with stable renal function between ages 18 and 70.    CBC & Differential [[id number]]  (Abnormal) Collected: 11/06/22 0611    Specimen: Blood Updated: 11/06/22 0658    Narrative:      The following orders were created for panel order CBC & Differential.  Procedure                               Abnormality         Status                      ---------                               -----------         ------                     CBC Auto Differential[[id number]]        Abnormal            Final result                 Please view results for these tests on the individual orders.    CBC Auto Differential [[id number]]  (Abnormal) Collected: 11/06/22 0611    Specimen: Blood Updated: 11/06/22 0658     WBC 2.88 10*3/mm3      RBC 3.14 10*6/mm3      Hemoglobin 9.7 g/dL      Hematocrit 30.0 %      MCV 95.5 fL      MCH 30.9 pg      MCHC 32.3 g/dL      RDW 15.5 %      RDW-SD 53.6 fl      MPV 9.2 fL      Platelets 309 10*3/mm3      Neutrophil % 38.2 %      Lymphocyte % 40.3 %      Monocyte % 14.6 %      Eosinophil % 5.9 %      Basophil % 1.0 %      Immature Grans % 0.0 %      Neutrophils, Absolute 1.10 10*3/mm3      Lymphocytes, Absolute 1.16 10*3/mm3      Monocytes, Absolute 0.42 10*3/mm3      Eosinophils, Absolute 0.17 10*3/mm3      Basophils, Absolute 0.03 10*3/mm3      Immature Grans, Absolute 0.00 10*3/mm3      nRBC 0.0 /100 WBC     Basic Metabolic Panel [[id number]]  (Abnormal) Collected: 11/05/22 0645    Specimen: Blood Updated: 11/05/22 0735     Glucose 82 mg/dL      BUN 11 mg/dL      Creatinine 0.55 mg/dL      Sodium 141 mmol/L      Potassium 3.5 mmol/L      Chloride 104 mmol/L      CO2 29.1 mmol/L      Calcium 8.6 mg/dL      BUN/Creatinine Ratio 20.0     Anion Gap 7.9 mmol/L      eGFR 91.6 mL/min/1.73      Comment: National Kidney Foundation and American Society of Nephrology (ASN) Task Force recommended calculation based on the Chronic Kidney Disease Epidemiology Collaboration (CKD-EPI) equation refit without adjustment for race.       Narrative:      GFR Normal >60  Chronic Kidney Disease <60  Kidney Failure <15    The GFR formula is only valid for adults with stable renal function between ages 18 and 70.    CBC & Differential [[id number]]  (Abnormal) Collected: 11/05/22 0645    Specimen: Blood Updated: 11/05/22 0720    Narrative:      The following orders  were created for panel order CBC & Differential.  Procedure                               Abnormality         Status                     ---------                               -----------         ------                     CBC Auto Differential[[id number]]        Abnormal            Final result                 Please view results for these tests on the individual orders.    CBC Auto Differential [[id number]]  (Abnormal) Collected: 11/05/22 0645    Specimen: Blood Updated: 11/05/22 0720     WBC 3.09 10*3/mm3      RBC 3.11 10*6/mm3      Hemoglobin 9.8 g/dL      Hematocrit 29.7 %      MCV 95.5 fL      MCH 31.5 pg      MCHC 33.0 g/dL      RDW 15.5 %      RDW-SD 52.6 fl      MPV 9.0 fL      Platelets 305 10*3/mm3      Neutrophil % 37.8 %      Lymphocyte % 39.5 %      Monocyte % 15.2 %      Eosinophil % 6.5 %      Basophil % 1.0 %      Immature Grans % 0.0 %      Neutrophils, Absolute 1.17 10*3/mm3      Lymphocytes, Absolute 1.22 10*3/mm3      Monocytes, Absolute 0.47 10*3/mm3      Eosinophils, Absolute 0.20 10*3/mm3      Basophils, Absolute 0.03 10*3/mm3      Immature Grans, Absolute 0.00 10*3/mm3      nRBC 0.0 /100 WBC     Basic Metabolic Panel [[id number]]  (Normal) Collected: 11/04/22 0613    Specimen: Blood Updated: 11/04/22 0708     Glucose 84 mg/dL      BUN 14 mg/dL      Creatinine 0.57 mg/dL      Sodium 140 mmol/L      Potassium 3.6 mmol/L      Chloride 103 mmol/L      CO2 25.4 mmol/L      Calcium 8.8 mg/dL      BUN/Creatinine Ratio 24.6     Anion Gap 11.6 mmol/L      eGFR 90.9 mL/min/1.73      Comment: National Kidney Foundation and American Society of Nephrology (ASN) Task Force recommended calculation based on the Chronic Kidney Disease Epidemiology Collaboration (CKD-EPI) equation refit without adjustment for race.       Narrative:      GFR Normal >60  Chronic Kidney Disease <60  Kidney Failure <15    The GFR formula is only valid for adults with stable renal function between ages 18 and 70.    CBC &  Differential [[id number]]  (Abnormal) Collected: 11/04/22 0613    Specimen: Blood Updated: 11/04/22 0651    Narrative:      The following orders were created for panel order CBC & Differential.  Procedure                               Abnormality         Status                     ---------                               -----------         ------                     CBC Auto Differential[[id number]]        Abnormal            Final result                 Please view results for these tests on the individual orders.    CBC Auto Differential [[id number]]  (Abnormal) Collected: 11/04/22 0613    Specimen: Blood Updated: 11/04/22 0651     WBC 3.21 10*3/mm3      RBC 3.22 10*6/mm3      Hemoglobin 9.9 g/dL      Hematocrit 30.1 %      MCV 93.5 fL      MCH 30.7 pg      MCHC 32.9 g/dL      RDW 15.3 %      RDW-SD 52.4 fl      MPV 8.9 fL      Platelets 306 10*3/mm3      Neutrophil % 40.5 %      Lymphocyte % 39.9 %      Monocyte % 13.1 %      Eosinophil % 5.3 %      Basophil % 0.9 %      Immature Grans % 0.3 %      Neutrophils, Absolute 1.30 10*3/mm3      Lymphocytes, Absolute 1.28 10*3/mm3      Monocytes, Absolute 0.42 10*3/mm3      Eosinophils, Absolute 0.17 10*3/mm3      Basophils, Absolute 0.03 10*3/mm3      Immature Grans, Absolute 0.01 10*3/mm3      nRBC 0.0 /100 WBC     Basic Metabolic Panel [[id number]]  (Normal) Collected: 11/03/22 0507    Specimen: Blood Updated: 11/03/22 0556     Glucose 85 mg/dL      BUN 11 mg/dL      Creatinine 0.63 mg/dL      Sodium 139 mmol/L      Potassium 3.8 mmol/L      Chloride 104 mmol/L      CO2 27.0 mmol/L      Calcium 8.6 mg/dL      BUN/Creatinine Ratio 17.5     Anion Gap 8.0 mmol/L      eGFR 88.7 mL/min/1.73      Comment: National Kidney Foundation and American Society of Nephrology (ASN) Task Force recommended calculation based on the Chronic Kidney Disease Epidemiology Collaboration (CKD-EPI) equation refit without adjustment for race.       Narrative:      GFR Normal >60  Chronic Kidney  "Disease <60  Kidney Failure <15    The GFR formula is only valid for adults with stable renal function between ages 18 and 70.    CBC & Differential [[id number]]  (Abnormal) Collected: 11/03/22 0507    Specimen: Blood Updated: 11/03/22 0541    Narrative:      The following orders were created for panel order CBC & Differential.  Procedure                               Abnormality         Status                     ---------                               -----------         ------                     CBC Auto Differential[[id number]]        Abnormal            Final result                 Please view results for these tests on the individual orders.    CBC Auto Differential [[id number]]  (Abnormal) Collected: 11/03/22 0507    Specimen: Blood Updated: 11/03/22 0541     WBC 3.46 10*3/mm3      RBC 3.22 10*6/mm3      Hemoglobin 10.1 g/dL      Hematocrit 30.8 %      MCV 95.7 fL      MCH 31.4 pg      MCHC 32.8 g/dL      RDW 15.8 %      RDW-SD 55.3 fl      MPV 8.6 fL      Platelets 313 10*3/mm3      Neutrophil % 38.4 %      Lymphocyte % 41.6 %      Monocyte % 13.3 %      Eosinophil % 5.5 %      Basophil % 0.9 %      Immature Grans % 0.3 %      Neutrophils, Absolute 1.33 10*3/mm3      Lymphocytes, Absolute 1.44 10*3/mm3      Monocytes, Absolute 0.46 10*3/mm3      Eosinophils, Absolute 0.19 10*3/mm3      Basophils, Absolute 0.03 10*3/mm3      Immature Grans, Absolute 0.01 10*3/mm3      nRBC 0.0 /100 WBC         /92 (BP Location: Right arm, Patient Position: Lying)   Pulse 76   Temp 98.2 °F (36.8 °C) (Oral)   Resp 16   Ht 172.7 cm (68\")   Wt 69.9 kg (154 lb 1.6 oz)   SpO2 95%   BMI 23.43 kg/m²     Discharge Exam:  General Appearance:    Alert, cooperative, no distress                          Head:    Normocephalic, without obvious abnormality, atraumatic                          Eyes:                            Throat:   Lips, tongue, gums normal                          Neck:   Supple, symmetrical, trachea midline, " no JVD                        Lungs:     Clear to auscultation bilaterally, respirations unlabored                Chest Wall:    No tenderness or deformity                        Heart:    Regular rate and rhythm, S1 and S2 normal, no murmur,no  Rub  or gallop                  Abdomen:     Soft, non-tender, bowel sounds active, no masses, no organomegaly                  Extremities:   Extremities normal, atraumatic, no cyanosis or edema                             Skin:   Skin is warm and dry,  no rashes or palpable lesions                  Neurologic:   no focal deficits noted     Disposition:  Home with home health    Activity as tolerated    Diet as tolerated  Diet Order   Procedures   • Diet Regular       Patient Instructions:      Discharge Medications      New Medications      Instructions Start Date   fludrocortisone 0.1 MG tablet   0.1 mg, Oral, Daily   Start Date: November 10, 2022        Continue These Medications      Instructions Start Date   atorvastatin 40 MG tablet  Commonly known as: LIPITOR   Oral, Daily      buPROPion  MG 24 hr tablet  Commonly known as: WELLBUTRIN XL   150 mg, Oral      folic acid 1 MG tablet  Commonly known as: FOLVITE   1,000 mcg, Oral, Daily      levothyroxine 100 MCG tablet  Commonly known as: SYNTHROID, LEVOTHROID   Oral, Daily      mirtazapine 30 MG tablet  Commonly known as: REMERON   30 mg, Oral, Every Night at Bedtime      venlafaxine XR 75 MG 24 hr capsule  Commonly known as: EFFEXOR-XR   Oral, Daily      Vitamin D (Cholecalciferol) 10 MCG (400 UNIT) tablet  Commonly known as: CHOLECALCIFEROL   2,000 Units, Oral, Daily         Stop These Medications    amLODIPine 5 MG tablet  Commonly known as: NORVASC     KLOR-CON 10 MEQ CR tablet  Generic drug: potassium chloride     metoprolol tartrate 25 MG tablet  Commonly known as: LOPRESSOR     rosuvastatin 20 MG tablet  Commonly known as: CRESTOR     triamterene-hydrochlorothiazide 37.5-25 MG per capsule  Commonly known  as: DYAZIDE          Future Appointments   Date Time Provider Department Center   11/14/2022  8:10 AM LAB CHAIR 5 [facility]   11/14/2022  8:40 AM Donald Knox MD [facility]   12/12/2022 12:30 PM LAB CHAIR 1 [facility]   12/12/2022  1:00 PM Donald Knox MD [facility]   12/12/2022  1:30 PM INJECTION CHAIR [facility]     Additional Instructions for the Follow-ups that You Need to Schedule     Ambulatory Referral to Home Health   As directed      Face to Face Visit Date: 10/29/2022    Follow-up provider for Plan of Care?: I treated the patient in an acute care facility and will not continue treatment after discharge.    Follow-up provider: ROSEANNE FARR [[id number]]    Reason/Clinical Findings: generalized weakness, orthostatic hypotension    Describe mobility limitations that make leaving home difficult: generalized weakness, orthostatic hypotension    Nursing/Therapeutic Services Requested: Physical Therapy Skilled Nursing    Skilled nursing orders: Cardiopulmonary assessments    PT orders: Strengthening Home safety assessment    Frequency: 1 Week 1         Ambulatory Referral to Home Health (Hospital)   As directed      Face to Face Visit Date: 11/9/2022    Follow-up provider for Plan of Care?: I treated the patient in an acute care facility and will not continue treatment after discharge.    Follow-up provider: ROSEANNE FARR [[id number]]    Reason/Clinical Findings: weak    Describe mobility limitations that make leaving home difficult: weakness    Nursing/Therapeutic Services Requested: Skilled Nursing Physical Therapy Occupational Therapy    Skilled nursing orders: Other    PT orders: Therapeutic exercise    Occupational orders: Strengthening    Frequency: 1 Week 1            Contact information for follow-up providers     Roseanne Farr MD Follow up in 1 week(s).    Specialty: Family Medicine  Contact information:  [address]  [facility] [address]  [phone]             Roseanne Sanchez MD Follow up.    Specialty: Family Medicine  Contact information:  [address]  [place] [id number]  [phone]                   Contact information for after-discharge care     Destination     [facility] .    Service: Skilled Nursing  Contact information:  [address] [facility] [id number] [phone]                           Discharge Order (From admission, onward)     Start     Ordered    11/09/22 1425  Discharge patient  Once        Expected Discharge Date: 11/09/22    Discharge Disposition: Home-Health Care [facility]    Physician of Record for Attribution - Please select from Treatment Team: ROHAN MANTILLA [[id number]]    Review needed by CMO to determine Physician of Record: No       Question Answer Comment   Physician of Record for Attribution - Please select from Treatment Team ROHAN MANTILLA    Review needed by CMO to determine Physician of Record No        11/09/22 1429    10/29/22 1142  Discharge patient  Once,   Status:  Canceled        Comments: Pending repeat physical therapy evaluation   Expected Discharge Date: 10/29/22    Expected Discharge Time: Midday    Discharge Disposition: Home or Self Care    Physician of Record for Attribution - Please select from Treatment Team: TAWNYA WALLACE [[id number]]    Review needed by CMO to determine Physician of Record: No       Question Answer Comment   Physician of Record for Attribution - Please select from Treatment Team TAWNYA WALLACE    Review needed by CMO to determine Physician of Record No        10/29/22 1141                Total time spent discharging patient including evaluation,post hospitalization follow up,  medication and post hospitalization instructions and education total time exceeds 30 minutes.    Signed:  Rohan Mantilla MD  11/9/2022  14:29 EST    Electronically signed by Rohan Mantilla MD at 11/09/22  [id number]

## 2022-11-10 NOTE — OUTREACH NOTE
Prep Survey    Flowsheet Row Responses   [facility] facility patient discharged from? [place]   Is LACE score < 7 ? No   Emergency Room discharge w/ pulse ox? No   Eligibility Readm Mgmt   Discharge diagnosis near syncope,  neutropenia,  hypotension, orthostatic   Does the patient have one of the following disease processes/diagnoses(primary or secondary)? Other   Does the patient have Home health ordered? Yes   What is the Home health agency?  [facility]   Is there a DME ordered? No   Medication alerts for this patient see AVS   Prep survey completed? Yes          FALGUNI SAUER - Registered Nurse

## 2022-11-11 NOTE — HOME HEALTH
SOC 11/10  DIAG: ORTOSTATIC HYPOTENSION  HX; ANEMIA, PANTOCYTOPENIA, HTN, DEPRESSION, ANXIETY, NEUTROPENIA, HYPOTHYROISM.   Patient is a 81 yo female lives in her home with her spouse.   She is up with walker since hospitalization, was not using before.   She is A&O x3,  has no c/o pain.   She is still having some dizziness , rev need to use caution with ambulation/ transfeing.   Nursing rev medications in home compared to d/c instructions.   Mar updated with correct meds,   no issues noted.   she is taking as prescribed and dtr who lvies next door is assisting her.       FOCUS OF CARE: T/I ON DISEASE PROCESS FOR ORTHOSTATIC HYPOTENSION, HOME MED MGMT AND HOME SAFETY

## 2022-11-11 NOTE — CASE COMMUNICATION
SOC 11/10  DIAG: ORTOSTATIC HYPOTENSION  HX; ANEMIA, PANTOCYTOPENIA, HTN, DEPRESSION, ANXIETY, NEUTROPENIA, HYPOTHYROISM.   Patient is a 83 yo female lives in her home with her spouse.   She is up with walker since hospitalization, was not using before.   She is A&O x3,  has no c/o pain.   She is still having some dizziness , rev need to use caution with ambulation/ transfeing.   Nursing rev medications in home compared to d/c instructi ons.   Mar updated with correct meds,   no issues noted.   she is taking as prescribed and dtr who lvies next door is assisting her.       FOCUS OF CARE: T/I ON DISEASE PROCESS FOR ORTHOSTATIC HYPOTENSION, HOME MED MGMT AND HOME SAFETY

## 2022-11-12 ENCOUNTER — HOME CARE VISIT (OUTPATIENT)
Dept: HOME HEALTH SERVICES | Facility: HOME HEALTHCARE | Age: 82
End: 2022-11-12
Payer: COMMERCIAL

## 2022-11-12 VITALS
SYSTOLIC BLOOD PRESSURE: 124 MMHG | OXYGEN SATURATION: 95 % | DIASTOLIC BLOOD PRESSURE: 60 MMHG | HEART RATE: 85 BPM | TEMPERATURE: 97 F

## 2022-11-12 PROCEDURE — G0151 HHCP-SERV OF PT,EA 15 MIN: HCPCS

## 2022-11-12 NOTE — HOME HEALTH
Reason for referral Patient has decreased stength, activity tolerance, difficulty with transfers, abnormal gait due to orthostatic hypotension     Diagnosis Patient was admitted to [facility] 10/25 to 11/9/22 with near syncopal episode, anemia, neutropenia, orthostatic hypotension, hypothyroidism, hypokalemia    surgical procedure na    PMHx HTN, breast CA, pancytopenia    Prior level of function Patient was ambulating independently without assistive device, independent with ADLs, IADLs, does not drive     Home social environment Patient resides with spouse, has chair lift on basement steps, has side entrance with 5 steps with rail.     Next MD appointment BENJY

## 2022-11-12 NOTE — PROGRESS NOTES
Subjective .     REASONS FOR FOLLOWUP:  1. Stage I breast carcinoma, status post initiation of Arimidex on 12/15/2011.   2. ? Erythrocytosis in the setting of recent syncopal episode reviewed 01/13/2014.   3. JAK2 analysis negative, slow improvement per hemoglobin and hematocrit. Low erythropoietin level noted, vestibular rehab successful in reducing dizziness.   4. Erythrocytosis analyses negative for myeloproliferative disorder, improvement per blood pressure med ication adjustment noted.   5. Patient seen 12/17/2014, stable. A 12-month followup is planned/Arimidex anticipated until 2016.   6. Patient seen 01/19/2016, additional DEXA scan and followup assessment December 2016 with anticipated completion of Arimidex.   7. Patient reviewed December 20, 2016, Arimidex is continued, bone density with evidence of osteopenia  8. 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  9. Patient reviewed October 20, 2019, continued issues with her left eye recognized  10. Patient assessed February 28 2020 stable clinically, plans for subsequent bone density as she approaches 10 years from diagnosis   11. Patient assessed March 1, 2021, vitamin D increased to 3000 is daily, bone density scheduled April 2021,?  Prolia  12. Patient assessed 5/24/2021 with ongoing osteopenia, trial Prolia initiated.  13. Patient seen 11/29/2021, stable clinically, Prolia to continue.  14. Patient admitted 10/25- 11/9/2022 for near syncope        History of Present Illness     Patient is an 81-year-old female with history of right-sided breast carcinoma. After delay initially, as a result of abscess petra-surgically, she was able to proceed with radiation therapy. We saw her late in December with bone jack burrows showing mild osteopenia. As a result of this, she began Arimidex at approximately the same time. She was seen on 3/26/12 doing well on her medication and completed radiation therapy, and was  actually improving in general at that point. We asked he r to continue Arimidex for an additional 4 months and seen on 8/9/12 doing well.       Thereafter she was asked to be reviewed again approximately six months later, 01/22/2013. She was having no difficulty with her Arimidex and we planned a yearly return.       She is now seen 01/13/2014 and though she it does not appear she is having trouble with the Arimidex, she does recognize two episodes of syncope that led to a hospitalization and adjustment of her blood pressure medications the last several months. She has no t had any additional episodes since last seen, but it is noted that her hemoglobin and hematocrit are increasing in levels.       The patient was reviewed 01/12/2013 with JAK2 analysis, in particular found to be negative for [id number] point mutation. Additional kevin dies, however, show a reduced erythropoietin level down to 5.4, normal iron studies, normal B12 studies, and normal serum chemistry.       The patient returns today, she states that she has gone on and been assessed for vestibular dysfunction, and through additional rehab and exercise she no longer has the dizziness that she had previously, in fact feels somewhat better than previous.       The patient when reviewed 03/12/2014 had gradual escalation of hemoglobin and hematocrit. 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 These were all normal. As the patient returns back today, 06/04/2014, she is feeling somewhat better overall as result of blood pressure medication adjustment and it is likely that hemoconcentration was the reason for her increasing hemoglobin and hematocrit levels.       The patient thereafter was asked to return for followup and is now seen in December 2014. She is doing very well overall with no additional symptoms.       The patient was asked to see him back now reviewed  01/19/2016 doing well overall. She recognizes that she is very likely going to complete Arimidex by December of this year.    The patient is now reviewed December 20, 2016.  We plan to discontinue her Arimidex today.  A follow-up bone density was performed December 13 revealing evidence of osteopenia-lumbar T score -1.7 with a 4.6% interval decrease, left hip density mL neck with a T score -1.3 with a 4.4% interval decrease in a right hip density T score -1.7 with a 9.1% interval decrease.  We have discussed with discontinuance of her AI therapy that this should improve.   The patient is next seen January 22, 2018.  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  She is ordered had the same process involving her right eye though this improved with corneal transplant previously.  She follows with ophthalmology regularly.    The patient's next seen December 28, 2019.  Her left eye is to the point that she is having very poor vision and she has been told this nothing now second be done to improve this.  She still is able to function howeve  The patient is next seen February 28, 2020 fortunately doing about the same and approaching 10 years from diagnosis.  We discussed her previous bone density done in 2015 demonstrating osteopenia.  This will need follow-up.  The patient is seen March 1, 2021 and was to have a bone density that was rescheduled as result of the icy roads.  Her subsequent testing does include a low normal vitamin D level.  Fortunately she is feeling well remains quite active and is status post COVID-19 vaccination.  The patient's neck seen back 5/24/2021 having supplemented further her vitamin D and, fortunately, feeling well.  She did have a fall several months ago and fortunately did not sustain any fracture but feels her balance is suspect.  Her repeat bone density continues to demonstrate osteopenia and as result of history we discussed a trial of Prolia  which she is willing to undergo treatment with.  The patient went on to take Prolia and is next seen back 11/29/2021 indicating that she had no side effects from its use and is generally feeling fairly well.    The patient had follow-up with primary care with a slow reduction of her blood count particularly in the last 6 months and she is next seen 10/6/2022 with anemia, leukopenia (neutropenia) and degree of lymphocytosis.    She is seen back in office with her daughter and granddaughter all indicating that the patient is under considerable emotional distress with her  in the hospital with a series of post COVID complications.  She has not been eating well or consistently as result has lost approximately 5 pounds.  We have discussed that her findings hematologically are somewhat concerning and that indirect testing should least be initiated down to looking for nutritional causes or perhaps progression from her previous myeloproliferative disorder (recognized her history) to a mild dysplastic syndrome or even acute leukemia.    The patient subsequent assessments included a normal folate, ferritin of 787, iron saturation of 12%, normal LDH, flow cytometry without evidence of acute leukemia or lymphoma, normal CMP.    The patient is contacted by telephone 10/24/2022 with indication that she has had a further decline.  Evidently she worsened with further weakness and cough development eventually presenting to hospital 10/20/2022 after being seen by her PCP and referred to the ER.  She tested negative for COVID, lactic acid was 2.5, creatinine 1.5, hemoglobin 9.1 and CT chest revealed dense right lower lobe consolidation with partial collapse compatible with pneumonia.  She received IV antibiotic therapies during hospitalization and apparently improved enough to be discharged home with oral Augmentin and Tessalon Perles.  Her daughter indicates that she is still quite weak and could not come to the office today  as result.  Her breathing and general performance status have not changed substantially however.     The patient required admission 10/25 - 11/9/2022 for near syncope.  We saw her in consultation and had her undergo bone marrow aspirate and biopsy 10/27/2022 demonstrating a normocellular marrow 30% trilineage monoparesis and decreased iron stores.  There is no evidence of leukemia documented though studies did suggest an atypical monocytic proliferation, no clear evidence of MDS was determined.  Her subsequent studies included CBC including 11/6/2022 with H&H of 9.7 and 30.0, white count 2880, platelet count 309,000.    The patient is next seen 11/14/2022 having slowly improved and seen with her daughter.  She is now on Florinef with less orthostasis and able to ambulate with a walker.  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     Past Medical History:   Diagnosis Date   • Anxiety    • Cancer (HCC)     Stage I, right breast cancer   • Depression    • Disease of thyroid gland     hypothyroidusm   • Erythrocytosis    • Hypercholesterolemia    • Hypertension    • Osteoporosis     osteopenia mild       ONCOLOGIC HISTORY:  (History from previous dates can be found in the separate document.)    Current Outpatient Medications on File Prior to Visit   Medication Sig Dispense Refill   • buPROPion XL (WELLBUTRIN XL) 300 MG 24 hr tablet Take 150 mg by mouth.     • calcium carbonate (TUMS) 500 MG chewable tablet Chew 1 tablet Daily.     • diphenhydrAMINE-acetaminophen (Tylenol PM Extra Strength)  MG tablet per tablet Take 1 tablet by mouth At Night As Needed for Sleep.     • fludrocortisone 0.1 MG tablet Take 1 tablet by mouth Daily for 30 days. 30 tablet 0   • folic acid (FOLVITE) 1 MG tablet Take 1,000 mcg by mouth Daily.     • levothyroxine (SYNTHROID, LEVOTHROID) 100  "MCG tablet Take  by mouth Daily.     • Melatonin 5 MG capsule Take 5 mg by mouth At Night As Needed (sleep).     • mirtazapine (REMERON) 30 MG tablet Take 30 mg by mouth every night at bedtime.     • Multiple Vitamins-Minerals (ICAPS AREDS 2 PO) Take 2 tablets by mouth Daily.     • venlafaxine XR (EFFEXOR-XR) 75 MG 24 hr capsule Take  by mouth Daily.     • Vitamin D, Cholecalciferol, (CHOLECALCIFEROL) 10 MCG (400 UNIT) tablet Take 5 tablets by mouth Daily.       No current facility-administered medications on file prior to visit.       ALLERGIES:     Allergies   Allergen Reactions   • Ciprofloxacin Unknown - Low Severity       Social History     Socioeconomic History   • Marital status:      Spouse name: Sami   Tobacco Use   • Smoking status: Never   • Smokeless tobacco: Never   Substance and Sexual Activity   • Alcohol use: Yes     Comment: 3-5 times per week   • Drug use: No   • Sexual activity: Defer         Cancer-related family history includes Brain cancer in her father; Breast cancer in her maternal aunt, paternal aunt, and another family member; Breast cancer (age of onset: 43) in her sister; Cancer in her maternal aunt; Liver cancer in her father.     Review of Systems   Constitutional: Positive for fatigue.   Respiratory: Negative for cough, chest tightness, shortness of breath and wheezing.    Gastrointestinal: Negative for abdominal pain, constipation, diarrhea, nausea and vomiting.   Neurological: Positive for dizziness (Improved from hospitalization). Negative for weakness.   All other systems reviewed and are negative.    Objective      Vitals:    11/14/22 0811   BP: 150/86   Pulse: 74   Resp: 16   Temp: 96.8 °F (36 °C)   TempSrc: Temporal   Weight: Comment: unable to get weight   Height: 167.9 cm (66.1\")   PainSc: 0-No pain     Current Status 11/14/2022   ECOG score 1        Physical Exam    Previous physical exam  GENERAL: Well-developed, well-nourished female in no acute distress using " wheelchair for ambulation  SKIN: Warm, dry without rashes, purpura or petechiae.   HEAD: Normocephalic.   EYES: Pupils equal, round and reactive to light. EOMs intact. Conjunctivae normal.   EARS: Hearing intact.   NOSE: Septum midline. No excoriations or nasal discharge.   MOUTH: Tongue is well-papillated; no stomatitis or ulcers. Lips normal.   THROAT: Oropharynx without lesions or exudates.   NECK: Supple with good range of motion; no thyromegaly or masses, no JVD or bruits.   LYMPHATICS: No cervical, supraclavicular, axillary or inguinal adenopathy.   CHEST: Lungs clear to percussion and auscultation.   CARDIAC: Regular rate and rhythm without murmurs, rubs or gallops.   ABDOMEN: Soft, nontender with no organomegaly or masses.   EXTREMITIES: No clubbing, cyanosis or edema.   NEUROLOGICAL: No focal neurological deficits.  Deep tendon reflexes 1+ both proximally and distally      RECENT LABS:  Hematology WBC   Date Value Ref Range Status   11/14/2022 2.83 (L) 3.40 - 10.80 10*3/mm3 Final   02/28/2022 3.05 (L) 4.5 - 11.0 10*3/uL Final     RBC   Date Value Ref Range Status   11/14/2022 3.41 (L) 3.77 - 5.28 10*6/mm3 Final   02/28/2022 4.44 4.0 - 5.2 10*6/uL Final     Hemoglobin   Date Value Ref Range Status   11/14/2022 10.1 (L) 12.0 - 15.9 g/dL Final   02/28/2022 14.0 12.0 - 16.0 g/dL Final     Hematocrit   Date Value Ref Range Status   11/14/2022 32.2 (L) 34.0 - 46.6 % Final   02/28/2022 41.5 36.0 - 46.0 % Final     Platelets   Date Value Ref Range Status   11/14/2022 293 140 - 450 10*3/mm3 Final   02/28/2022 262 140 - 440 10*3/uL Final          Assessment & Plan   81-year-old female with:   1. Stage I breast carcinoma, status post initiation of Arimidex on 12/15/2011. She is tolerated this well and the drug was discontinued December 2016.  2. Erythrocytosis; consistent with hypertension and treatment thereof. Her current labs studies are   acceptable.     3.  Patient referred back for progressive leukopenia,  reviewed 10/6/2022 with pancytopenia developing with nutritional losses as a result of ongoing emotional distress with her 's illness  · Repeat laboratory testing t including CMP, flow cytometry peripheral blood, ferritin, iron profile, folate, LDH, MMA B12 levels  · 1 to 2-week follow-up MD, potential bone marrow aspirate and biopsy to follow.  Patient, and her daughter, agree with plan and follow-up.  ·  Subsequent laboratory studies without directive findings  · Patient hospitalized 10/20/2022 at [facility] with right lower lobe dense pneumonia treated with IV antibiotic therapy, discharged on Augmentin and Tessalon Perles  · Telephone visit 10/24/2022 with plans made for transfusion- hemoglobin 7.7 g% at discharge  · Follow-up visit 1 to 2 weeks, CBC, likely follow-up chest x-ray, potential marrow examination pending her repeat peripheral blood smear exam.  · Patient admitted 10/25 - 11/9/2022 for near syncope.  She underwent a marrow aspirate and biopsy 10/27/2022 demonstrating normal cellularity at 30% trilineage hematopoiesis with studies suggesting an atypical monocytic proliferation.  At this point cytogenetics and NGS are pending repeat CBC studies are at acceptable.  · Every 2 weeks review until patient seen back in 4 to 6 weeks      3.  Osteopenia-recent bone density 4/23/2021 continues to demonstrate osteopenia unchanged from 12/13/2/16.  We have discussed a trial of Prolia which will be initiated 5/24/2021.  A follow-up dose will be given 11/29/2021 patient seen back in 6 months.  Her subsequent bone density will not be necessary now until late April 2023.

## 2022-11-14 ENCOUNTER — HOME CARE VISIT (OUTPATIENT)
Dept: HOME HEALTH SERVICES | Facility: HOME HEALTHCARE | Age: 82
End: 2022-11-14
Payer: COMMERCIAL

## 2022-11-14 ENCOUNTER — OFFICE VISIT (OUTPATIENT)
Dept: ONCOLOGY | Facility: CLINIC | Age: 82
End: 2022-11-14

## 2022-11-14 ENCOUNTER — LAB (OUTPATIENT)
Dept: LAB | Facility: HOSPITAL | Age: 82
End: 2022-11-14

## 2022-11-14 VITALS
SYSTOLIC BLOOD PRESSURE: 150 MMHG | DIASTOLIC BLOOD PRESSURE: 86 MMHG | RESPIRATION RATE: 16 BRPM | HEART RATE: 74 BPM | HEIGHT: 66 IN | TEMPERATURE: 96.8 F | BODY MASS INDEX: 24.8 KG/M2

## 2022-11-14 VITALS
HEART RATE: 57 BPM | SYSTOLIC BLOOD PRESSURE: 148 MMHG | DIASTOLIC BLOOD PRESSURE: 88 MMHG | RESPIRATION RATE: 17 BRPM | OXYGEN SATURATION: 98 % | TEMPERATURE: 98 F

## 2022-11-14 VITALS
OXYGEN SATURATION: 97 % | HEART RATE: 88 BPM | DIASTOLIC BLOOD PRESSURE: 84 MMHG | TEMPERATURE: 99.2 F | SYSTOLIC BLOOD PRESSURE: 122 MMHG | RESPIRATION RATE: 20 BRPM

## 2022-11-14 DIAGNOSIS — D70.8 OTHER NEUTROPENIA: Primary | ICD-10-CM

## 2022-11-14 DIAGNOSIS — D64.9 SYMPTOMATIC ANEMIA: ICD-10-CM

## 2022-11-14 DIAGNOSIS — C50.011 MALIGNANT NEOPLASM OF AREOLA OF RIGHT BREAST IN FEMALE, UNSPECIFIED ESTROGEN RECEPTOR STATUS: ICD-10-CM

## 2022-11-14 LAB
BASOPHILS # BLD AUTO: 0.03 10*3/MM3 (ref 0–0.2)
BASOPHILS NFR BLD AUTO: 1.1 % (ref 0–1.5)
DEPRECATED RDW RBC AUTO: 54.8 FL (ref 37–54)
EOSINOPHIL # BLD AUTO: 0.28 10*3/MM3 (ref 0–0.4)
EOSINOPHIL NFR BLD AUTO: 9.9 % (ref 0.3–6.2)
ERYTHROCYTE [DISTWIDTH] IN BLOOD BY AUTOMATED COUNT: 15.9 % (ref 12.3–15.4)
HCT VFR BLD AUTO: 32.2 % (ref 34–46.6)
HGB BLD-MCNC: 10.1 G/DL (ref 12–15.9)
IMM GRANULOCYTES # BLD AUTO: 0.04 10*3/MM3 (ref 0–0.05)
IMM GRANULOCYTES NFR BLD AUTO: 1.4 % (ref 0–0.5)
LYMPHOCYTES # BLD AUTO: 1 10*3/MM3 (ref 0.7–3.1)
LYMPHOCYTES NFR BLD AUTO: 35.3 % (ref 19.6–45.3)
MCH RBC QN AUTO: 29.6 PG (ref 26.6–33)
MCHC RBC AUTO-ENTMCNC: 31.4 G/DL (ref 31.5–35.7)
MCV RBC AUTO: 94.4 FL (ref 79–97)
MONOCYTES # BLD AUTO: 0.43 10*3/MM3 (ref 0.1–0.9)
MONOCYTES NFR BLD AUTO: 15.2 % (ref 5–12)
NEUTROPHILS NFR BLD AUTO: 1.05 10*3/MM3 (ref 1.7–7)
NEUTROPHILS NFR BLD AUTO: 37.1 % (ref 42.7–76)
NRBC BLD AUTO-RTO: 0 /100 WBC (ref 0–0.2)
PLATELET # BLD AUTO: 293 10*3/MM3 (ref 140–450)
PMV BLD AUTO: 9.7 FL (ref 6–12)
RBC # BLD AUTO: 3.41 10*6/MM3 (ref 3.77–5.28)
WBC NRBC COR # BLD: 2.83 10*3/MM3 (ref 3.4–10.8)

## 2022-11-14 PROCEDURE — 36415 COLL VENOUS BLD VENIPUNCTURE: CPT

## 2022-11-14 PROCEDURE — G0300 HHS/HOSPICE OF LPN EA 15 MIN: HCPCS

## 2022-11-14 PROCEDURE — 99214 OFFICE O/P EST MOD 30 MIN: CPT | Performed by: INTERNAL MEDICINE

## 2022-11-14 PROCEDURE — G0152 HHCP-SERV OF OT,EA 15 MIN: HCPCS

## 2022-11-14 PROCEDURE — 85025 COMPLETE CBC W/AUTO DIFF WBC: CPT

## 2022-11-14 NOTE — HOME HEALTH
"CURRENT SITUATION: She was admitted to [facility] 10-25-22 to 11-09-22 w/near syncopal episode, orthostatic hypotension. No falls due to this or any other issue. Her medications were changed and low BP issue is better now.  SOC 11-10-22.    PMHx: anemia, pantocytopenia, HTN, depression, anxiety, neurtopenia, hypothyroid.  DIAGNOSIS: orthostatic hypotension.  SUBJECTIVE: \"I really feel fine, but my legs need strengthening.\" Agreeable to OT evaluation.  SOCIAL & ENVIRONMENTAL SITUATION: Pt lives w/spouse. Home accessible via ramp. Ambulation w/o an AD prior to hospitalization and now using rollator. Independent w/ADLs.  PATIENT'S &/OR CAREGIVER'S GOAL: \"I want to be able to walk w/o this walker and go up and down those steps.\" She pointed to the 6 steps at side door to driveway.  INTERVENTIONS: OT Eval, ADL training, Home Safety, Therapeutic Exercise/Activity, Transfer/Mobility training, Monitor vitals including SPO2 via pulse-oximeter (notifiy MD if resting O2 <90% on room air), Patient/CG education, Falls risk prevention, Recommendations on AE, DME, AD, environmental adaptations.  ASSESSMENT, MEDICAL NECESSITY, PLAN: She is doing very well in terms of her functional ADLs and in home mobilities. She used the rollator throughout the visit safely and appropriately. She is Independent w/dressing, showering from walk-in tub w/seat, toileting, transfers in/out of walk-in tub, on/off comfort height toilet w/wall mounted grab bar use, bed w/bed rail use, and recliner. She was instructed in a BUE AROM HEP w/resistive band and only 1 follow up visit is needed to make sure there are no issues w/it. Pt agrees that no other OT is needed. She will benefit from PT to help her achieve her goal of strengthening her legs for ambulation w/o an AD and steps. Her BP was elevated this visit and I called in report to her PCP Dr. Roseanne Sanchez and gave report to pt's daughter Wanda and our LPN who were both there at my " departure.  PLAN FOR NEXT VISIT: OT d/c and f/u on BUE AROM HEP w/band.

## 2022-11-15 ENCOUNTER — TELEPHONE (OUTPATIENT)
Dept: ONCOLOGY | Facility: CLINIC | Age: 82
End: 2022-11-15

## 2022-11-15 VITALS
RESPIRATION RATE: 18 BRPM | SYSTOLIC BLOOD PRESSURE: 140 MMHG | TEMPERATURE: 97.2 F | HEART RATE: 82 BPM | OXYGEN SATURATION: 97 % | DIASTOLIC BLOOD PRESSURE: 80 MMHG

## 2022-11-15 NOTE — HOME HEALTH
"SNV FOR CP ASSESSMENT AND T/I ON DISEASE MANAGMENT R/T ORTOSTATIC HYPOTENSION  HX; ANEMIA, PANTOCYTOPENIA, HTN, DEPRESSION, ANXIETY, NEUTROPENIA, HYPOTHYROISM.   THERAPY PRESENT DURING VISIT AND PATIENT B/P ELEVATED. THERAPY WILL CONTACT PCP.  PATIENT DENIES H/A, CHEST PAIN, BLURRED VISITION. PATIENT VOICED, \" AT TIMES SHE HAS DIZZY SPEELS.\"  NURSE INSTRUCTED TO FLEX BOTH FOOT UP AND DOWN PRIOR TO STANDING, THIS HELPS WITH BLOOD CIRCULATION.   AND DAUGHTER PRESENT DURING VISIT"

## 2022-11-16 ENCOUNTER — HOME CARE VISIT (OUTPATIENT)
Dept: HOME HEALTH SERVICES | Facility: HOME HEALTHCARE | Age: 82
End: 2022-11-16
Payer: COMMERCIAL

## 2022-11-16 VITALS
DIASTOLIC BLOOD PRESSURE: 84 MMHG | RESPIRATION RATE: 18 BRPM | HEART RATE: 81 BPM | TEMPERATURE: 97.8 F | OXYGEN SATURATION: 97 % | SYSTOLIC BLOOD PRESSURE: 148 MMHG

## 2022-11-16 PROCEDURE — G0151 HHCP-SERV OF PT,EA 15 MIN: HCPCS

## 2022-11-16 NOTE — HOME HEALTH
Plan for next visit: review standing exercises, practice steps weather permitting    Subjective:  I'm feeling better.  I'm doing OK.    Falls since last visit:  none   Home/Social Environment:  lives with  in home with steps to enter/ exit, home also has ramp

## 2022-11-17 ENCOUNTER — HOME CARE VISIT (OUTPATIENT)
Dept: HOME HEALTH SERVICES | Facility: HOME HEALTHCARE | Age: 82
End: 2022-11-17
Payer: COMMERCIAL

## 2022-11-17 VITALS
TEMPERATURE: 97.1 F | RESPIRATION RATE: 18 BRPM | SYSTOLIC BLOOD PRESSURE: 138 MMHG | OXYGEN SATURATION: 96 % | DIASTOLIC BLOOD PRESSURE: 74 MMHG | HEART RATE: 74 BPM

## 2022-11-17 PROCEDURE — G0299 HHS/HOSPICE OF RN EA 15 MIN: HCPCS

## 2022-11-17 PROCEDURE — G0152 HHCP-SERV OF OT,EA 15 MIN: HCPCS

## 2022-11-18 ENCOUNTER — HOME CARE VISIT (OUTPATIENT)
Dept: HOME HEALTH SERVICES | Facility: HOME HEALTHCARE | Age: 82
End: 2022-11-18
Payer: COMMERCIAL

## 2022-11-18 VITALS
SYSTOLIC BLOOD PRESSURE: 130 MMHG | DIASTOLIC BLOOD PRESSURE: 80 MMHG | OXYGEN SATURATION: 98 % | RESPIRATION RATE: 17 BRPM | HEART RATE: 67 BPM | TEMPERATURE: 97.9 F

## 2022-11-21 ENCOUNTER — HOME CARE VISIT (OUTPATIENT)
Dept: HOME HEALTH SERVICES | Facility: HOME HEALTHCARE | Age: 82
End: 2022-11-21
Payer: COMMERCIAL

## 2022-11-21 VITALS
OXYGEN SATURATION: 99 % | TEMPERATURE: 97.8 F | SYSTOLIC BLOOD PRESSURE: 170 MMHG | RESPIRATION RATE: 18 BRPM | DIASTOLIC BLOOD PRESSURE: 110 MMHG | HEART RATE: 58 BPM

## 2022-11-21 VITALS
HEART RATE: 58 BPM | TEMPERATURE: 98 F | DIASTOLIC BLOOD PRESSURE: 82 MMHG | RESPIRATION RATE: 18 BRPM | OXYGEN SATURATION: 96 % | SYSTOLIC BLOOD PRESSURE: 148 MMHG

## 2022-11-21 PROCEDURE — G0151 HHCP-SERV OF PT,EA 15 MIN: HCPCS

## 2022-11-21 PROCEDURE — G0299 HHS/HOSPICE OF RN EA 15 MIN: HCPCS

## 2022-11-21 NOTE — HOME HEALTH
Patient reports no med changes.  Feels like she is improving, feeling good today.  CP assessment WNL, lungs clear.  Reports no ortho hypotension episodes.   Continue to monitor BP status.

## 2022-11-23 ENCOUNTER — HOME CARE VISIT (OUTPATIENT)
Dept: HOME HEALTH SERVICES | Facility: HOME HEALTHCARE | Age: 82
End: 2022-11-23
Payer: COMMERCIAL

## 2022-11-23 ENCOUNTER — READMISSION MANAGEMENT (OUTPATIENT)
Dept: CALL CENTER | Facility: HOSPITAL | Age: 82
End: 2022-11-23

## 2022-11-23 VITALS
DIASTOLIC BLOOD PRESSURE: 85 MMHG | SYSTOLIC BLOOD PRESSURE: 150 MMHG | HEART RATE: 51 BPM | OXYGEN SATURATION: 99 % | TEMPERATURE: 97.5 F | RESPIRATION RATE: 18 BRPM

## 2022-11-23 PROCEDURE — G0151 HHCP-SERV OF PT,EA 15 MIN: HCPCS

## 2022-11-23 NOTE — HOME HEALTH
Subjective:  I'm good.  My doctor told me no surfing (furniture walking).    Falls since last visit:  none   Home/Social Environment:  lives with  in home with steps to enter/ exit, home also has ramp

## 2022-11-23 NOTE — OUTREACH NOTE
Medical Week 2 Survey    Flowsheet Row Responses   [facility] patient discharged from? [place]   Does the patient have one of the following disease processes/diagnoses(primary or secondary)? Other   Week 2 attempt successful? Yes   Call start time 1305   Discharge diagnosis near syncope,  neutropenia,  hypotension, orthostatic   Call end time 1306   Does the patient have all medications ordered at discharge? Yes   Is the patient taking all medications as directed (includes completed medication regime)? Yes   Does the patient have a primary care provider?  Yes   Has the patient kept scheduled appointments due by today? Yes   What is the Home health agency?  [facility]   Has home health visited the patient within 72 hours of discharge? Yes   Home health comments Home health is still coming   Psychosocial issues? No   Did the patient receive a copy of their discharge instructions? Yes   What is the patient's perception of their health status since discharge? Improving   Is the patient/caregiver able to teach back the hierarchy of who to call/visit for symptoms/problems? PCP, Specialist, Home health nurse, Urgent Care, ED, 911 Yes   Week 2 Call Completed? Yes   Graduated Yes   Did the patient feel the follow up calls were helpful during their recovery period? Yes   Was the number of calls appropriate? Yes   Graduated/Revoked comments Doing well, no further calls needed.          WILLY MOORE - Registered Nurse

## 2022-11-28 ENCOUNTER — HOME CARE VISIT (OUTPATIENT)
Dept: HOME HEALTH SERVICES | Facility: HOME HEALTHCARE | Age: 82
End: 2022-11-28
Payer: COMMERCIAL

## 2022-11-28 PROCEDURE — G0300 HHS/HOSPICE OF LPN EA 15 MIN: HCPCS

## 2022-11-29 ENCOUNTER — CLINICAL SUPPORT (OUTPATIENT)
Dept: ONCOLOGY | Facility: HOSPITAL | Age: 82
End: 2022-11-29

## 2022-11-29 ENCOUNTER — LAB (OUTPATIENT)
Dept: LAB | Facility: HOSPITAL | Age: 82
End: 2022-11-29

## 2022-11-29 VITALS
TEMPERATURE: 98.1 F | OXYGEN SATURATION: 99 % | DIASTOLIC BLOOD PRESSURE: 78 MMHG | RESPIRATION RATE: 18 BRPM | HEART RATE: 58 BPM | SYSTOLIC BLOOD PRESSURE: 140 MMHG

## 2022-11-29 DIAGNOSIS — D70.8 OTHER NEUTROPENIA: ICD-10-CM

## 2022-11-29 DIAGNOSIS — D64.9 SYMPTOMATIC ANEMIA: ICD-10-CM

## 2022-11-29 LAB
BASOPHILS # BLD AUTO: 0.04 10*3/MM3 (ref 0–0.2)
BASOPHILS NFR BLD AUTO: 1.1 % (ref 0–1.5)
DEPRECATED RDW RBC AUTO: 50.5 FL (ref 37–54)
EOSINOPHIL # BLD AUTO: 0.15 10*3/MM3 (ref 0–0.4)
EOSINOPHIL NFR BLD AUTO: 4 % (ref 0.3–6.2)
ERYTHROCYTE [DISTWIDTH] IN BLOOD BY AUTOMATED COUNT: 15.4 % (ref 12.3–15.4)
HCT VFR BLD AUTO: 35.7 % (ref 34–46.6)
HGB BLD-MCNC: 11.1 G/DL (ref 12–15.9)
IMM GRANULOCYTES # BLD AUTO: 0.01 10*3/MM3 (ref 0–0.05)
IMM GRANULOCYTES NFR BLD AUTO: 0.3 % (ref 0–0.5)
LYMPHOCYTES # BLD AUTO: 1.12 10*3/MM3 (ref 0.7–3.1)
LYMPHOCYTES NFR BLD AUTO: 29.7 % (ref 19.6–45.3)
MCH RBC QN AUTO: 28 PG (ref 26.6–33)
MCHC RBC AUTO-ENTMCNC: 31.1 G/DL (ref 31.5–35.7)
MCV RBC AUTO: 90.2 FL (ref 79–97)
MONOCYTES # BLD AUTO: 0.44 10*3/MM3 (ref 0.1–0.9)
MONOCYTES NFR BLD AUTO: 11.7 % (ref 5–12)
NEUTROPHILS NFR BLD AUTO: 2.01 10*3/MM3 (ref 1.7–7)
NEUTROPHILS NFR BLD AUTO: 53.2 % (ref 42.7–76)
NRBC BLD AUTO-RTO: 0 /100 WBC (ref 0–0.2)
PLATELET # BLD AUTO: 277 10*3/MM3 (ref 140–450)
PMV BLD AUTO: 9.9 FL (ref 6–12)
RBC # BLD AUTO: 3.96 10*6/MM3 (ref 3.77–5.28)
WBC NRBC COR # BLD: 3.77 10*3/MM3 (ref 3.4–10.8)

## 2022-11-29 PROCEDURE — G0463 HOSPITAL OUTPT CLINIC VISIT: HCPCS

## 2022-11-29 PROCEDURE — 36415 COLL VENOUS BLD VENIPUNCTURE: CPT

## 2022-11-29 PROCEDURE — 85025 COMPLETE CBC W/AUTO DIFF WBC: CPT

## 2022-11-29 NOTE — NURSING NOTE
Lab Results   Component Value Date    WBC 3.77 11/29/2022    HGB 11.1 (L) 11/29/2022    HCT 35.7 11/29/2022    MCV 90.2 11/29/2022     11/29/2022     CBC RESULTS R/W PT AND HER DGT. COUNTS TODAY LOOK GREAT AND PT REPORTS FEELING GOOD. WENT OVER RTN APPT DETAILS. COPY OF LABS GIVEN TO THE PT.

## 2022-12-05 LAB
DX PRELIMINARY: NORMAL
LAB AP CASE REPORT: NORMAL
LAB AP CLINICAL INFORMATION: NORMAL
LAB AP DIAGNOSIS COMMENT: NORMAL
LAB AP FLOW CYTOMETRY SUMMARY: NORMAL
LAB AP SPECIAL STAINS: NORMAL
Lab: NORMAL
PATH REPORT.ADDENDUM SPEC: NORMAL
PATH REPORT.FINAL DX SPEC: NORMAL
PATH REPORT.GROSS SPEC: NORMAL

## 2022-12-06 ENCOUNTER — HOME CARE VISIT (OUTPATIENT)
Dept: HOME HEALTH SERVICES | Facility: HOME HEALTHCARE | Age: 82
End: 2022-12-06
Payer: COMMERCIAL

## 2022-12-06 VITALS
SYSTOLIC BLOOD PRESSURE: 142 MMHG | RESPIRATION RATE: 18 BRPM | OXYGEN SATURATION: 97 % | HEART RATE: 71 BPM | DIASTOLIC BLOOD PRESSURE: 90 MMHG | TEMPERATURE: 98.2 F

## 2022-12-06 PROCEDURE — G0299 HHS/HOSPICE OF RN EA 15 MIN: HCPCS

## 2022-12-08 NOTE — HOME HEALTH
Only complaint patient has is of some neuropathy in her feet.  Visually inspected and palpated feet and there seems to be no issues externally so it is like nerve related.  Patient states she will follow up with PCP on her own time and does not wish for home health to prolong discharge and she is agreeable to d/c today.

## 2022-12-12 ENCOUNTER — INFUSION (OUTPATIENT)
Dept: ONCOLOGY | Facility: HOSPITAL | Age: 82
End: 2022-12-12

## 2022-12-12 ENCOUNTER — LAB (OUTPATIENT)
Dept: LAB | Facility: HOSPITAL | Age: 82
End: 2022-12-12

## 2022-12-12 ENCOUNTER — OFFICE VISIT (OUTPATIENT)
Dept: ONCOLOGY | Facility: CLINIC | Age: 82
End: 2022-12-12

## 2022-12-12 VITALS
TEMPERATURE: 97.1 F | HEIGHT: 66 IN | RESPIRATION RATE: 16 BRPM | DIASTOLIC BLOOD PRESSURE: 88 MMHG | WEIGHT: 152.8 LBS | SYSTOLIC BLOOD PRESSURE: 144 MMHG | HEART RATE: 55 BPM | BODY MASS INDEX: 24.56 KG/M2 | OXYGEN SATURATION: 95 %

## 2022-12-12 DIAGNOSIS — D64.9 SYMPTOMATIC ANEMIA: ICD-10-CM

## 2022-12-12 DIAGNOSIS — C50.011 MALIGNANT NEOPLASM OF AREOLA OF RIGHT BREAST IN FEMALE, UNSPECIFIED ESTROGEN RECEPTOR STATUS: Primary | ICD-10-CM

## 2022-12-12 DIAGNOSIS — M85.89 OSTEOPENIA OF MULTIPLE SITES: ICD-10-CM

## 2022-12-12 DIAGNOSIS — C50.011 MALIGNANT NEOPLASM OF AREOLA OF RIGHT BREAST IN FEMALE, UNSPECIFIED ESTROGEN RECEPTOR STATUS: ICD-10-CM

## 2022-12-12 DIAGNOSIS — M81.0 OSTEOPOROSIS WITHOUT CURRENT PATHOLOGICAL FRACTURE, UNSPECIFIED OSTEOPOROSIS TYPE: Primary | ICD-10-CM

## 2022-12-12 DIAGNOSIS — M81.0 OSTEOPOROSIS WITHOUT CURRENT PATHOLOGICAL FRACTURE, UNSPECIFIED OSTEOPOROSIS TYPE: ICD-10-CM

## 2022-12-12 LAB
ALBUMIN SERPL-MCNC: 3.9 G/DL (ref 3.5–5.2)
ALBUMIN/GLOB SERPL: 1.3 G/DL (ref 1.1–2.4)
ALP SERPL-CCNC: 70 U/L (ref 38–116)
ALT SERPL W P-5'-P-CCNC: 19 U/L (ref 0–33)
ANION GAP SERPL CALCULATED.3IONS-SCNC: 10.5 MMOL/L (ref 5–15)
AST SERPL-CCNC: 28 U/L (ref 0–32)
BASOPHILS # BLD AUTO: 0.04 10*3/MM3 (ref 0–0.2)
BASOPHILS NFR BLD AUTO: 1.1 % (ref 0–1.5)
BILIRUB SERPL-MCNC: 0.4 MG/DL (ref 0.2–1.2)
BUN SERPL-MCNC: 11 MG/DL (ref 6–20)
BUN/CREAT SERPL: 16.9 (ref 7.3–30)
CALCIUM SPEC-SCNC: 9.4 MG/DL (ref 8.5–10.2)
CHLORIDE SERPL-SCNC: 104 MMOL/L (ref 98–107)
CO2 SERPL-SCNC: 26.5 MMOL/L (ref 22–29)
CREAT SERPL-MCNC: 0.65 MG/DL (ref 0.6–1.1)
DEPRECATED RDW RBC AUTO: 51.2 FL (ref 37–54)
EGFRCR SERPLBLD CKD-EPI 2021: 88 ML/MIN/1.73
EOSINOPHIL # BLD AUTO: 0.14 10*3/MM3 (ref 0–0.4)
EOSINOPHIL NFR BLD AUTO: 3.8 % (ref 0.3–6.2)
ERYTHROCYTE [DISTWIDTH] IN BLOOD BY AUTOMATED COUNT: 15.6 % (ref 12.3–15.4)
GLOBULIN UR ELPH-MCNC: 3 GM/DL (ref 1.8–3.5)
GLUCOSE SERPL-MCNC: 95 MG/DL (ref 74–124)
HCT VFR BLD AUTO: 38.6 % (ref 34–46.6)
HGB BLD-MCNC: 11.9 G/DL (ref 12–15.9)
IMM GRANULOCYTES # BLD AUTO: 0 10*3/MM3 (ref 0–0.05)
IMM GRANULOCYTES NFR BLD AUTO: 0 % (ref 0–0.5)
LYMPHOCYTES # BLD AUTO: 0.89 10*3/MM3 (ref 0.7–3.1)
LYMPHOCYTES NFR BLD AUTO: 24.2 % (ref 19.6–45.3)
MAGNESIUM SERPL-MCNC: 1.9 MG/DL (ref 1.8–2.5)
MCH RBC QN AUTO: 27.9 PG (ref 26.6–33)
MCHC RBC AUTO-ENTMCNC: 30.8 G/DL (ref 31.5–35.7)
MCV RBC AUTO: 90.4 FL (ref 79–97)
MONOCYTES # BLD AUTO: 0.31 10*3/MM3 (ref 0.1–0.9)
MONOCYTES NFR BLD AUTO: 8.4 % (ref 5–12)
NEUTROPHILS NFR BLD AUTO: 2.3 10*3/MM3 (ref 1.7–7)
NEUTROPHILS NFR BLD AUTO: 62.5 % (ref 42.7–76)
NRBC BLD AUTO-RTO: 0 /100 WBC (ref 0–0.2)
PHOSPHATE SERPL-MCNC: 4.2 MG/DL (ref 2.5–4.5)
PLATELET # BLD AUTO: 225 10*3/MM3 (ref 140–450)
PMV BLD AUTO: 9.1 FL (ref 6–12)
POTASSIUM SERPL-SCNC: 4 MMOL/L (ref 3.5–4.7)
PROT SERPL-MCNC: 6.9 G/DL (ref 6.3–8)
RBC # BLD AUTO: 4.27 10*6/MM3 (ref 3.77–5.28)
SODIUM SERPL-SCNC: 141 MMOL/L (ref 134–145)
WBC NRBC COR # BLD: 3.68 10*3/MM3 (ref 3.4–10.8)

## 2022-12-12 PROCEDURE — 96372 THER/PROPH/DIAG INJ SC/IM: CPT

## 2022-12-12 PROCEDURE — 83735 ASSAY OF MAGNESIUM: CPT

## 2022-12-12 PROCEDURE — 25010000002 DENOSUMAB 60 MG/ML SOLUTION PREFILLED SYRINGE: Performed by: INTERNAL MEDICINE

## 2022-12-12 PROCEDURE — 85025 COMPLETE CBC W/AUTO DIFF WBC: CPT

## 2022-12-12 PROCEDURE — 80053 COMPREHEN METABOLIC PANEL: CPT

## 2022-12-12 PROCEDURE — 84100 ASSAY OF PHOSPHORUS: CPT

## 2022-12-12 PROCEDURE — 36415 COLL VENOUS BLD VENIPUNCTURE: CPT

## 2022-12-12 PROCEDURE — 99214 OFFICE O/P EST MOD 30 MIN: CPT | Performed by: INTERNAL MEDICINE

## 2022-12-12 RX ADMIN — DENOSUMAB 60 MG: 60 INJECTION SUBCUTANEOUS at 13:25

## 2022-12-12 NOTE — PROGRESS NOTES
REASONS FOR FOLLOWUP:  1. Stage I breast carcinoma, status post initiation of Arimidex on 12/15/2011.   2. ? Erythrocytosis in the setting of recent syncopal episode reviewed 01/13/2014.   3. JAK2 analysis negative, slow improvement per hemoglobin and hematocrit. Low erythropoietin level noted, vestibular rehab successful in reducing dizziness.   4. Erythrocytosis analyses negative for myeloproliferative disorder, improvement per blood pressure med ication adjustment noted.   5. Patient seen 12/17/2014, stable. A 12-month followup is planned/Arimidex anticipated until 2016.   6. Patient seen 01/19/2016, additional DEXA scan and followup assessment December 2016 with anticipated completion of Arimidex.   7. Patient reviewed December 20, 2016, Arimidex is continued, bone density with evidence of osteopenia  8. 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  9. Patient reviewed October 20, 2019, continued issues with her left eye recognized  10. Patient assessed February 28 2020 stable clinically, plans for subsequent bone density as she approaches 10 years from diagnosis   11. Patient assessed March 1, 2021, vitamin D increased to 3000 is daily, bone density scheduled April 2021,?  Prolia  12. Patient assessed 5/24/2021 with ongoing osteopenia, trial Prolia initiated.  13. Patient seen 11/29/2021, stable clinically, Prolia to continue.  14. Patient admitted 10/25- 11/9/2022 for near syncope  15. 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 After delay initially, as a result of abscess petra-surgically, she was able to proceed with radiation therapy. We saw her late in December with bone de n sitshereen showing mild osteopenia. As a result of this, she began Arimidex at approximately the  same time. She was seen on 3/26/12 doing well on her medication and completed radiation therapy, and was actually improving in general at that point. We asked he r to continue Arimidex for an additional 4 months and seen on 8/9/12 doing well.       Thereafter she was asked to be reviewed again approximately six months later, 01/22/2013. She was having no difficulty with her Arimidex and we planned a yearly return.       She is now seen 01/13/2014 and though she it does not appear she is having trouble with the Arimidex, she does recognize two episodes of syncope that led to a hospitalization and adjustment of her blood pressure medications the last several months. She has no t had any additional episodes since last seen, but it is noted that her hemoglobin and hematocrit are increasing in levels.       The patient was reviewed 01/12/2013 with JAK2 analysis, in particular found to be negative for [id number] point mutation. Additional kevin dies, however, show a reduced erythropoietin level down to 5.4, normal iron studies, normal B12 studies, and normal serum chemistry.       The patient returns today, she states that she has gone on and been assessed for vestibular dysfunction, and through additional rehab and exercise she no longer has the dizziness that she had previously, in fact feels somewhat better than previous.       The patient when reviewed 03/12/2014 had gradual escalation of hemoglobin and hematocrit. 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 These were all normal. As the patient returns back today, 06/04/2014, she is feeling somewhat better overall as result of blood pressure medication adjustment and it is likely that hemoconcentration was the reason for her increasing hemoglobin and hematocrit levels.       The patient thereafter was asked to return for followup and is now seen in December 2014. She is doing very  well overall with no additional symptoms.       The patient was asked to see him back now reviewed 01/19/2016 doing well overall. She recognizes that she is very likely going to complete Arimidex by December of this year.    The patient is now reviewed December 20, 2016.  We plan to discontinue her Arimidex today.  A follow-up bone density was performed December 13 revealing evidence of osteopenia-lumbar T score -1.7 with a 4.6% interval decrease, left hip density mL neck with a T score -1.3 with a 4.4% interval decrease in a right hip density T score -1.7 with a 9.1% interval decrease.  We have discussed with discontinuance of her AI therapy that this should improve.   The patient is next seen January 22, 2018.  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  She is ordered had the same process involving her right eye though this improved with corneal transplant previously.  She follows with ophthalmology regularly.    The patient's next seen December 28, 2019.  Her left eye is to the point that she is having very poor vision and she has been told this nothing now second be done to improve this.  She still is able to function howeve  The patient is next seen February 28, 2020 fortunately doing about the same and approaching 10 years from diagnosis.  We discussed her previous bone density done in 2015 demonstrating osteopenia.  This will need follow-up.  The patient is seen March 1, 2021 and was to have a bone density that was rescheduled as result of the icy roads.  Her subsequent testing does include a low normal vitamin D level.  Fortunately she is feeling well remains quite active and is status post COVID-19 vaccination.  The patient's neck seen back 5/24/2021 having supplemented further her vitamin D and, fortunately, feeling well.  She did have a fall several months ago and fortunately did not sustain any fracture but feels her balance is suspect.  Her repeat bone  density continues to demonstrate osteopenia and as result of history we discussed a trial of Prolia which she is willing to undergo treatment with.  The patient went on to take Prolia and is next seen back 11/29/2021 indicating that she had no side effects from its use and is generally feeling fairly well.    The patient had follow-up with primary care with a slow reduction of her blood count particularly in the last 6 months and she is next seen 10/6/2022 with anemia, leukopenia (neutropenia) and degree of lymphocytosis.    She is seen back in office with her daughter and granddaughter all indicating that the patient is under considerable emotional distress with her  in the hospital with a series of post COVID complications.  She has not been eating well or consistently as result has lost approximately 5 pounds.  We have discussed that her findings hematologically are somewhat concerning and that indirect testing should least be initiated down to looking for nutritional causes or perhaps progression from her previous myeloproliferative disorder (recognized her history) to a mild dysplastic syndrome or even acute leukemia.    The patient subsequent assessments included a normal folate, ferritin of 787, iron saturation of 12%, normal LDH, flow cytometry without evidence of acute leukemia or lymphoma, normal CMP.    The patient is contacted by telephone 10/24/2022 with indication that she has had a further decline.  Evidently she worsened with further weakness and cough development eventually presenting to hospital 10/20/2022 after being seen by her PCP and referred to the ER.  She tested negative for COVID, lactic acid was 2.5, creatinine 1.5, hemoglobin 9.1 and CT chest revealed dense right lower lobe consolidation with partial collapse compatible with pneumonia.  She received IV antibiotic therapies during hospitalization and apparently improved enough to be discharged home with oral Augmentin and Tessalon  Letitia.  Her daughter indicates that she is still quite weak and could not come to the office today as result.  Her breathing and general performance status have not changed substantially however.     The patient required admission 10/25 - 11/9/2022 for near syncope.  We saw her in consultation and had her undergo bone marrow aspirate and biopsy 10/27/2022 demonstrating a normocellular marrow 30% trilineage monoparesis and decreased iron stores.  There is no evidence of leukemia documented though studies did suggest an atypical monocytic proliferation, no clear evidence of MDS was determined.  Her subsequent studies included CBC including 11/6/2022 with H&H of 9.7 and 30.0, white count 2880, platelet count 309,000.    The patient is next seen 11/14/2022 having slowly improved and seen with her daughter.  She is now on Florinef with less orthostasis and able to ambulate with a walker.  Hematologically her studies remain essentially stable and we have talked about the development of CMML and likely an early myelodysplastic process.  Subsequent NGS findings include low-level mutations in TET 2 gene, and ASXL1 genes.  These findings are consistent with an early myelodysplastic syndrome or chronic myelomonocytic leukemia.    Patient reassessed 12/12/2022 with repeat improving with H&H 11.9 and 38.6, white count 3680 and platelet count of 225,000.  The patient Teofilo that she is felt considerably better over the last several weeks though still has lower extremity swelling requiring compression hose.  She is also due for follow-up Prolia and a bone density will next be scheduled in April.     Past Medical History:   Diagnosis Date   • Anxiety    • Cancer (HCC)     Stage I, right breast cancer   • Depression    • Disease of thyroid gland     hypothyroidusm   • Erythrocytosis    • Hypercholesterolemia    • Hypertension    • Osteoporosis     osteopenia mild       ONCOLOGIC HISTORY:  (History from previous dates can be found in  the separate document.)    Current Outpatient Medications on File Prior to Visit   Medication Sig Dispense Refill   • buPROPion XL (WELLBUTRIN XL) 300 MG 24 hr tablet Take 150 mg by mouth.     • calcium carbonate (TUMS) 500 MG chewable tablet Chew 1 tablet Daily.     • diphenhydrAMINE-acetaminophen (Tylenol PM Extra Strength)  MG tablet per tablet Take 1 tablet by mouth At Night As Needed for Sleep.     • folic acid (FOLVITE) 1 MG tablet Take 1,000 mcg by mouth Daily.     • levothyroxine (SYNTHROID, LEVOTHROID) 100 MCG tablet Take 100 mcg by mouth Daily.     • Melatonin 5 MG capsule Take 5 mg by mouth At Night As Needed (sleep).     • metoprolol tartrate (LOPRESSOR) 25 MG tablet      • mirtazapine (REMERON) 30 MG tablet Take 30 mg by mouth every night at bedtime.     • Multiple Vitamins-Minerals (ICAPS AREDS 2 PO) Take 2 tablets by mouth Daily.     • venlafaxine XR (EFFEXOR-XR) 75 MG 24 hr capsule Take 75 mg by mouth Daily.     • Vitamin D, Cholecalciferol, (CHOLECALCIFEROL) 10 MCG (400 UNIT) tablet Take 5 tablets by mouth Daily.     • fludrocortisone 0.1 MG tablet Take 1 tablet by mouth Daily for 30 days. 30 tablet 0     No current facility-administered medications on file prior to visit.       ALLERGIES:     Allergies   Allergen Reactions   • Ciprofloxacin Unknown - Low Severity       Social History     Socioeconomic History   • Marital status:      Spouse name: Sami   Tobacco Use   • Smoking status: Never   • Smokeless tobacco: Never   Substance and Sexual Activity   • Alcohol use: Yes     Comment: 3-5 times per week   • Drug use: No   • Sexual activity: Defer         Cancer-related family history includes Brain cancer in her father; Breast cancer in her maternal aunt, paternal aunt, and another family member; Breast cancer (age of onset: 43) in her sister; Cancer in her maternal aunt; Liver cancer in her father.     Review of Systems   Constitutional: Positive for fatigue.   Respiratory: Negative  "for cough, chest tightness, shortness of breath and wheezing.    Gastrointestinal: Negative for abdominal pain, constipation, diarrhea, nausea and vomiting.   Neurological: Positive for dizziness (Improved from hospitalization). Negative for weakness.   All other systems reviewed and are negative.    Objective      Vitals:    12/12/22 1240   BP: 144/88   Pulse: 55   Resp: 16   Temp: 97.1 °F (36.2 °C)   TempSrc: Temporal   SpO2: 95%   Weight: 69.3 kg (152 lb 12.8 oz)   Height: 167.9 cm (66.1\")   PainSc: 0-No pain     Current Status 12/12/2022   ECOG score 2        Physical Exam    Previous physical exam  GENERAL: Well-developed, well-nourished female in no acute distress using wheelchair for ambulation  SKIN: Warm, dry without rashes, purpura or petechiae.   HEAD: Normocephalic.   EYES: Pupils equal, round and reactive to light. EOMs intact. Conjunctivae normal.   EARS: Hearing intact.   NOSE: Septum midline. No excoriations or nasal discharge.   MOUTH: Tongue is well-papillated; no stomatitis or ulcers. Lips normal.   THROAT: Oropharynx without lesions or exudates.   NECK: Supple with good range of motion; no thyromegaly or masses, no JVD or bruits.   LYMPHATICS: No cervical, supraclavicular, axillary or inguinal adenopathy.   CHEST: Lungs clear to percussion and auscultation.   CARDIAC: Regular rate and rhythm without murmurs, rubs or gallops.   ABDOMEN: Soft, nontender with no organomegaly or masses.   EXTREMITIES: No clubbing, cyanosis or edema.   NEUROLOGICAL: No focal neurological deficits.  Deep tendon reflexes 1+ both proximally and distally      RECENT LABS:  Hematology WBC   Date Value Ref Range Status   12/12/2022 3.68 3.40 - 10.80 10*3/mm3 Final   02/28/2022 3.05 (L) 4.5 - 11.0 10*3/uL Final     RBC   Date Value Ref Range Status   12/12/2022 4.27 3.77 - 5.28 10*6/mm3 Final   02/28/2022 4.44 4.0 - 5.2 10*6/uL Final     Hemoglobin   Date Value Ref Range Status   12/12/2022 11.9 (L) 12.0 - 15.9 g/dL Final "   02/28/2022 14.0 12.0 - 16.0 g/dL Final     Hematocrit   Date Value Ref Range Status   12/12/2022 38.6 34.0 - 46.6 % Final   02/28/2022 41.5 36.0 - 46.0 % Final     Platelets   Date Value Ref Range Status   12/12/2022 225 140 - 450 10*3/mm3 Final   02/28/2022 262 140 - 440 10*3/uL Final          Assessment & Plan   82-year-old female with:   1. Stage I breast carcinoma, status post initiation of Arimidex on 12/15/2011. She is tolerated this well and the drug was discontinued December 2016.  2. Erythrocytosis; consistent with hypertension and treatment thereof. Her current labs studies are   acceptable.   3.  Patient referred back for progressive leukopenia, reviewed 10/6/2022 with pancytopenia developing with nutritional losses as a result of ongoing emotional distress with her 's illness  · Repeat laboratory testing t including CMP, flow cytometry peripheral blood, ferritin, iron profile, folate, LDH, MMA B12 levels  · 1 to 2-week follow-up MD, potential bone marrow aspirate and biopsy to follow.  Patient, and her daughter, agree with plan and follow-up.  ·  Subsequent laboratory studies without directive findings  · Patient hospitalized 10/20/2022 at [facility] with right lower lobe dense pneumonia treated with IV antibiotic therapy, discharged on Augmentin and Tessalon Perles  · Telephone visit 10/24/2022 with plans made for transfusion- hemoglobin 7.7 g% at discharge  · Follow-up visit 1 to 2 weeks, CBC, likely follow-up chest x-ray, potential marrow examination pending her repeat peripheral blood smear exam.  · Patient admitted 10/25 - 11/9/2022 for near syncope.  She underwent a marrow aspirate and biopsy 10/27/2022 demonstrating normal cellularity at 30% trilineage hematopoiesis with studies suggesting an atypical monocytic proliferation.  At this point cytogenetics and NGS are pending repeat CBC studies are at acceptable.  · Every 2 weeks review until patient seen back in 4 to 6  weeks  · Patient reassessed 12/12/2022 with further improvement of peripheral blood counts, subsequent NGS consistent with early CMML versus MDS      3.  Osteopenia-recent bone density 4/23/2021 continues to demonstrate osteopenia unchanged from 12/13/2/16.  We have discussed a trial of Prolia which will be initiated 5/24/2021.  A follow-up dose will be given 11/29/2021 patient seen back in 6 months.  She is seen 12/12/2022 due for her subsequent Prolia her subsequent bone density will not be necessary now until late April 2023.      Plan:  *Proceed with Prolia today  *Every 2 month CBC RN evaluation  *4 months NP, subsequent scheduling of bone density

## 2023-02-06 ENCOUNTER — LAB (OUTPATIENT)
Dept: LAB | Facility: HOSPITAL | Age: 83
End: 2023-02-06
Payer: MEDICARE

## 2023-02-06 ENCOUNTER — CLINICAL SUPPORT (OUTPATIENT)
Dept: ONCOLOGY | Facility: HOSPITAL | Age: 83
End: 2023-02-06
Payer: MEDICARE

## 2023-02-06 DIAGNOSIS — C50.011 MALIGNANT NEOPLASM OF AREOLA OF RIGHT BREAST IN FEMALE, UNSPECIFIED ESTROGEN RECEPTOR STATUS: ICD-10-CM

## 2023-02-06 DIAGNOSIS — D64.9 SYMPTOMATIC ANEMIA: ICD-10-CM

## 2023-02-06 DIAGNOSIS — M85.89 OSTEOPENIA OF MULTIPLE SITES: ICD-10-CM

## 2023-02-06 LAB
BASOPHILS # BLD AUTO: 0.02 10*3/MM3 (ref 0–0.2)
BASOPHILS NFR BLD AUTO: 0.4 % (ref 0–1.5)
DEPRECATED RDW RBC AUTO: 49.2 FL (ref 37–54)
EOSINOPHIL # BLD AUTO: 0.04 10*3/MM3 (ref 0–0.4)
EOSINOPHIL NFR BLD AUTO: 0.9 % (ref 0.3–6.2)
ERYTHROCYTE [DISTWIDTH] IN BLOOD BY AUTOMATED COUNT: 16.5 % (ref 12.3–15.4)
HCT VFR BLD AUTO: 44.2 % (ref 34–46.6)
HGB BLD-MCNC: 14.3 G/DL (ref 12–15.9)
IMM GRANULOCYTES # BLD AUTO: 0.01 10*3/MM3 (ref 0–0.05)
IMM GRANULOCYTES NFR BLD AUTO: 0.2 % (ref 0–0.5)
LYMPHOCYTES # BLD AUTO: 1.25 10*3/MM3 (ref 0.7–3.1)
LYMPHOCYTES NFR BLD AUTO: 28 % (ref 19.6–45.3)
MCH RBC QN AUTO: 26.8 PG (ref 26.6–33)
MCHC RBC AUTO-ENTMCNC: 32.4 G/DL (ref 31.5–35.7)
MCV RBC AUTO: 82.9 FL (ref 79–97)
MONOCYTES # BLD AUTO: 0.41 10*3/MM3 (ref 0.1–0.9)
MONOCYTES NFR BLD AUTO: 9.2 % (ref 5–12)
NEUTROPHILS NFR BLD AUTO: 2.74 10*3/MM3 (ref 1.7–7)
NEUTROPHILS NFR BLD AUTO: 61.3 % (ref 42.7–76)
NRBC BLD AUTO-RTO: 0 /100 WBC (ref 0–0.2)
PLATELET # BLD AUTO: 220 10*3/MM3 (ref 140–450)
PMV BLD AUTO: 9.1 FL (ref 6–12)
RBC # BLD AUTO: 5.33 10*6/MM3 (ref 3.77–5.28)
WBC NRBC COR # BLD: 4.47 10*3/MM3 (ref 3.4–10.8)

## 2023-02-06 PROCEDURE — 85025 COMPLETE CBC W/AUTO DIFF WBC: CPT

## 2023-02-06 PROCEDURE — 36415 COLL VENOUS BLD VENIPUNCTURE: CPT

## 2023-02-06 PROCEDURE — G0463 HOSPITAL OUTPT CLINIC VISIT: HCPCS

## 2023-02-06 NOTE — NURSING NOTE
Patient is here for lab review with RN.  CBC reviewed, counts are stable for this patient at this time. Patient has no complaints. Copy of labs given to patient and follow up appointment reviewed. Patient is instructed to call the office with any concerns or new symptoms prior to next visit. Patient verbalized understanding and discharged in stable condition.  Lab Results   Component Value Date    WBC 4.47 02/06/2023    HGB 14.3 02/06/2023    HCT 44.2 02/06/2023    MCV 82.9 02/06/2023     02/06/2023     Has f/u with NP in April

## 2023-04-10 ENCOUNTER — LAB (OUTPATIENT)
Dept: LAB | Facility: HOSPITAL | Age: 83
End: 2023-04-10
Payer: MEDICARE

## 2023-04-10 ENCOUNTER — OFFICE VISIT (OUTPATIENT)
Dept: ONCOLOGY | Facility: CLINIC | Age: 83
End: 2023-04-10
Payer: MEDICARE

## 2023-04-10 VITALS
TEMPERATURE: 97.8 F | BODY MASS INDEX: 25.41 KG/M2 | RESPIRATION RATE: 18 BRPM | WEIGHT: 158.1 LBS | OXYGEN SATURATION: 98 % | SYSTOLIC BLOOD PRESSURE: 169 MMHG | HEIGHT: 66 IN | HEART RATE: 59 BPM | DIASTOLIC BLOOD PRESSURE: 92 MMHG

## 2023-04-10 DIAGNOSIS — C50.011 MALIGNANT NEOPLASM OF AREOLA OF RIGHT BREAST IN FEMALE, UNSPECIFIED ESTROGEN RECEPTOR STATUS: ICD-10-CM

## 2023-04-10 DIAGNOSIS — M85.89 OSTEOPENIA OF MULTIPLE SITES: ICD-10-CM

## 2023-04-10 DIAGNOSIS — D64.9 SYMPTOMATIC ANEMIA: ICD-10-CM

## 2023-04-10 DIAGNOSIS — M85.89 OSTEOPENIA OF MULTIPLE SITES: Primary | ICD-10-CM

## 2023-04-10 LAB
BASOPHILS # BLD AUTO: 0.02 10*3/MM3 (ref 0–0.2)
BASOPHILS NFR BLD AUTO: 0.5 % (ref 0–1.5)
DEPRECATED RDW RBC AUTO: 54.2 FL (ref 37–54)
EOSINOPHIL # BLD AUTO: 0.06 10*3/MM3 (ref 0–0.4)
EOSINOPHIL NFR BLD AUTO: 1.4 % (ref 0.3–6.2)
ERYTHROCYTE [DISTWIDTH] IN BLOOD BY AUTOMATED COUNT: 17.5 % (ref 12.3–15.4)
HCT VFR BLD AUTO: 40.9 % (ref 34–46.6)
HGB BLD-MCNC: 14.1 G/DL (ref 12–15.9)
IMM GRANULOCYTES # BLD AUTO: 0.02 10*3/MM3 (ref 0–0.05)
IMM GRANULOCYTES NFR BLD AUTO: 0.5 % (ref 0–0.5)
LYMPHOCYTES # BLD AUTO: 1.42 10*3/MM3 (ref 0.7–3.1)
LYMPHOCYTES NFR BLD AUTO: 34 % (ref 19.6–45.3)
MCH RBC QN AUTO: 29.3 PG (ref 26.6–33)
MCHC RBC AUTO-ENTMCNC: 34.5 G/DL (ref 31.5–35.7)
MCV RBC AUTO: 84.9 FL (ref 79–97)
MONOCYTES # BLD AUTO: 0.48 10*3/MM3 (ref 0.1–0.9)
MONOCYTES NFR BLD AUTO: 11.5 % (ref 5–12)
NEUTROPHILS NFR BLD AUTO: 2.18 10*3/MM3 (ref 1.7–7)
NEUTROPHILS NFR BLD AUTO: 52.1 % (ref 42.7–76)
NRBC BLD AUTO-RTO: 0 /100 WBC (ref 0–0.2)
PLATELET # BLD AUTO: 167 10*3/MM3 (ref 140–450)
PMV BLD AUTO: 10.5 FL (ref 6–12)
RBC # BLD AUTO: 4.82 10*6/MM3 (ref 3.77–5.28)
WBC NRBC COR # BLD: 4.18 10*3/MM3 (ref 3.4–10.8)

## 2023-04-10 PROCEDURE — 85025 COMPLETE CBC W/AUTO DIFF WBC: CPT

## 2023-04-10 PROCEDURE — 36415 COLL VENOUS BLD VENIPUNCTURE: CPT

## 2023-04-10 PROCEDURE — 3077F SYST BP >= 140 MM HG: CPT | Performed by: NURSE PRACTITIONER

## 2023-04-10 PROCEDURE — 99214 OFFICE O/P EST MOD 30 MIN: CPT | Performed by: NURSE PRACTITIONER

## 2023-04-10 PROCEDURE — 3080F DIAST BP >= 90 MM HG: CPT | Performed by: NURSE PRACTITIONER

## 2023-04-10 PROCEDURE — 1126F AMNT PAIN NOTED NONE PRSNT: CPT | Performed by: NURSE PRACTITIONER

## 2023-04-10 RX ORDER — TRIAMTERENE AND HYDROCHLOROTHIAZIDE 37.5; 25 MG/1; MG/1
0.5 TABLET ORAL DAILY
COMMUNITY
Start: 2023-03-07

## 2023-04-10 RX ORDER — ROSUVASTATIN CALCIUM 20 MG/1
1 TABLET, COATED ORAL DAILY
COMMUNITY
Start: 2023-03-09

## 2023-04-10 RX ORDER — AMLODIPINE BESYLATE 5 MG/1
TABLET ORAL
COMMUNITY
Start: 2023-04-08

## 2023-04-10 NOTE — PROGRESS NOTES
REASONS FOR FOLLOWUP:  1. Stage I breast carcinoma, status post initiation of Arimidex on 12/15/2011.   2. ? Erythrocytosis in the setting of recent syncopal episode reviewed 01/13/2014.   3. JAK2 analysis negative, slow improvement per hemoglobin and hematocrit. Low erythropoietin level noted, vestibular rehab successful in reducing dizziness.   4. Erythrocytosis analyses negative for myeloproliferative disorder, improvement per blood pressure med ication adjustment noted.   5. Patient seen 12/17/2014, stable. A 12-month followup is planned/Arimidex anticipated until 2016.   6. Patient seen 01/19/2016, additional DEXA scan and followup assessment December 2016 with anticipated completion of Arimidex.   7. Patient reviewed December 20, 2016, Arimidex is continued, bone density with evidence of osteopenia  8. 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  9. Patient reviewed October 20, 2019, continued issues with her left eye recognized  10. Patient assessed February 28 2020 stable clinically, plans for subsequent bone density as she approaches 10 years from diagnosis   11. Patient assessed March 1, 2021, vitamin D increased to 3000 is daily, bone density scheduled April 2021,?  Prolia  12. Patient assessed 5/24/2021 with ongoing osteopenia, trial Prolia initiated.  13. Patient seen 11/29/2021, stable clinically, Prolia to continue.  14. Patient admitted 10/25- 11/9/2022 for near syncope  15. 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 After delay initially, as a result of abscess petra-surgically, she was able to proceed with radiation therapy. We saw her late in December with bone de n sitshereen showing mild osteopenia. As a result of this, she began Arimidex at approximately the  same time. She was seen on 3/26/12 doing well on her medication and completed radiation therapy, and was actually improving in general at that point. We asked he r to continue Arimidex for an additional 4 months and seen on 8/9/12 doing well.       Thereafter she was asked to be reviewed again approximately six months later, 01/22/2013. She was having no difficulty with her Arimidex and we planned a yearly return.       She is now seen 01/13/2014 and though she it does not appear she is having trouble with the Arimidex, she does recognize two episodes of syncope that led to a hospitalization and adjustment of her blood pressure medications the last several months. She has no t had any additional episodes since last seen, but it is noted that her hemoglobin and hematocrit are increasing in levels.       The patient was reviewed 01/12/2013 with JAK2 analysis, in particular found to be negative for S0013R point mutation. Additional kevin dies, however, show a reduced erythropoietin level down to 5.4, normal iron studies, normal B12 studies, and normal serum chemistry.       The patient returns today, she states that she has gone on and been assessed for vestibular dysfunction, and through additional rehab and exercise she no longer has the dizziness that she had previously, in fact feels somewhat better than previous.       The patient when reviewed 03/12/2014 had gradual escalation of hemoglobin and hematocrit. 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 These were all normal. As the patient returns back today, 06/04/2014, she is feeling somewhat better overall as result of blood pressure medication adjustment and it is likely that hemoconcentration was the reason for her increasing hemoglobin and hematocrit levels.       The patient thereafter was asked to return for followup and is now seen in December 2014. She is doing very  well overall with no additional symptoms.       The patient was asked to see him back now reviewed 01/19/2016 doing well overall. She recognizes that she is very likely going to complete Arimidex by December of this year.    The patient is now reviewed December 20, 2016.  We plan to discontinue her Arimidex today.  A follow-up bone density was performed December 13 revealing evidence of osteopenia-lumbar T score -1.7 with a 4.6% interval decrease, left hip density mL neck with a T score -1.3 with a 4.4% interval decrease in a right hip density T score -1.7 with a 9.1% interval decrease.  We have discussed with discontinuance of her AI therapy that this should improve.   The patient is next seen January 22, 2018.  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  She is ordered had the same process involving her right eye though this improved with corneal transplant previously.  She follows with ophthalmology regularly.    The patient's next seen December 28, 2019.  Her left eye is to the point that she is having very poor vision and she has been told this nothing now second be done to improve this.  She still is able to function howeve  The patient is next seen February 28, 2020 fortunately doing about the same and approaching 10 years from diagnosis.  We discussed her previous bone density done in 2015 demonstrating osteopenia.  This will need follow-up.  The patient is seen March 1, 2021 and was to have a bone density that was rescheduled as result of the icy roads.  Her subsequent testing does include a low normal vitamin D level.  Fortunately she is feeling well remains quite active and is status post COVID-19 vaccination.  The patient's neck seen back 5/24/2021 having supplemented further her vitamin D and, fortunately, feeling well.  She did have a fall several months ago and fortunately did not sustain any fracture but feels her balance is suspect.  Her repeat bone  density continues to demonstrate osteopenia and as result of history we discussed a trial of Prolia which she is willing to undergo treatment with.  The patient went on to take Prolia and is next seen back 11/29/2021 indicating that she had no side effects from its use and is generally feeling fairly well.    The patient had follow-up with primary care with a slow reduction of her blood count particularly in the last 6 months and she is next seen 10/6/2022 with anemia, leukopenia (neutropenia) and degree of lymphocytosis.    She is seen back in office with her daughter and granddaughter all indicating that the patient is under considerable emotional distress with her  in the hospital with a series of post COVID complications.  She has not been eating well or consistently as result has lost approximately 5 pounds.  We have discussed that her findings hematologically are somewhat concerning and that indirect testing should least be initiated down to looking for nutritional causes or perhaps progression from her previous myeloproliferative disorder (recognized her history) to a mild dysplastic syndrome or even acute leukemia.    The patient subsequent assessments included a normal folate, ferritin of 787, iron saturation of 12%, normal LDH, flow cytometry without evidence of acute leukemia or lymphoma, normal CMP.    The patient is contacted by telephone 10/24/2022 with indication that she has had a further decline.  Evidently she worsened with further weakness and cough development eventually presenting to hospital 10/20/2022 after being seen by her PCP and referred to the ER.  She tested negative for COVID, lactic acid was 2.5, creatinine 1.5, hemoglobin 9.1 and CT chest revealed dense right lower lobe consolidation with partial collapse compatible with pneumonia.  She received IV antibiotic therapies during hospitalization and apparently improved enough to be discharged home with oral Augmentin and Tessalon  Letitia.  Her daughter indicates that she is still quite weak and could not come to the office today as result.  Her breathing and general performance status have not changed substantially however.     The patient required admission 10/25 - 11/9/2022 for near syncope.  We saw her in consultation and had her undergo bone marrow aspirate and biopsy 10/27/2022 demonstrating a normocellular marrow 30% trilineage monoparesis and decreased iron stores.  There is no evidence of leukemia documented though studies did suggest an atypical monocytic proliferation, no clear evidence of MDS was determined.  Her subsequent studies included CBC including 11/6/2022 with H&H of 9.7 and 30.0, white count 2880, platelet count 309,000.    The patient is next seen 11/14/2022 having slowly improved and seen with her daughter.  She is now on Florinef with less orthostasis and able to ambulate with a walker.  Hematologically her studies remain essentially stable and we have talked about the development of CMML and likely an early myelodysplastic process.  Subsequent NGS findings include low-level mutations in TET 2 gene, and ASXL1 genes.  These findings are consistent with an early myelodysplastic syndrome or chronic myelomonocytic leukemia.    Patient reassessed 12/12/2022 with repeat improving with H&H 11.9 and 38.6, white count 3680 and platelet count of 225,000.  The patient Teofilo that she is felt considerably better over the last several weeks though still has lower extremity swelling requiring compression hose.  She is also due for follow-up Prolia and a bone density will next be scheduled in April.    She returns 4/10/23 with hemoglobin 14.1, hematocrit 40..9.  She continues with fatigue.  She is due for a DEXA scan and this is ordered today      Past Medical History:   Diagnosis Date   • Anxiety    • Cancer     Stage I, right breast cancer   • Depression    • Disease of thyroid gland     hypothyroidusm   • Erythrocytosis    •  Hypercholesterolemia    • Hypertension    • Osteoporosis     osteopenia mild       ONCOLOGIC HISTORY:  (History from previous dates can be found in the separate document.)    Current Outpatient Medications on File Prior to Visit   Medication Sig Dispense Refill   • amLODIPine (NORVASC) 5 MG tablet      • buPROPion XL (WELLBUTRIN XL) 300 MG 24 hr tablet Take 150 mg by mouth.     • calcium carbonate (TUMS) 500 MG chewable tablet Chew 1 tablet Daily.     • diphenhydrAMINE-acetaminophen (Tylenol PM Extra Strength)  MG tablet per tablet Take 1 tablet by mouth At Night As Needed for Sleep.     • folic acid (FOLVITE) 1 MG tablet Take 1 tablet by mouth Daily.     • levothyroxine (SYNTHROID, LEVOTHROID) 100 MCG tablet Take 1 tablet by mouth Daily.     • Melatonin 5 MG capsule Take 5 mg by mouth At Night As Needed (sleep).     • metoprolol tartrate (LOPRESSOR) 25 MG tablet      • mirtazapine (REMERON) 30 MG tablet Take 1 tablet by mouth every night at bedtime.     • Multiple Vitamins-Minerals (ICAPS AREDS 2 PO) Take 2 tablets by mouth Daily.     • rosuvastatin (CRESTOR) 20 MG tablet Take 1 tablet by mouth Daily.     • triamterene-hydrochlorothiazide (MAXZIDE-25) 37.5-25 MG per tablet Take 0.5 tablets by mouth Daily.     • venlafaxine XR (EFFEXOR-XR) 75 MG 24 hr capsule Take 1 capsule by mouth Daily.     • Vitamin D, Cholecalciferol, (CHOLECALCIFEROL) 10 MCG (400 UNIT) tablet Take 5 tablets by mouth Daily.     • fludrocortisone 0.1 MG tablet Take 1 tablet by mouth Daily for 30 days. 30 tablet 0     No current facility-administered medications on file prior to visit.       ALLERGIES:     Allergies   Allergen Reactions   • Ciprofloxacin Unknown - Low Severity       Social History     Socioeconomic History   • Marital status:      Spouse name: Sami   Tobacco Use   • Smoking status: Never   • Smokeless tobacco: Never   Substance and Sexual Activity   • Alcohol use: Yes     Comment: 3-5 times per week   • Drug use:  "No   • Sexual activity: Defer         Cancer-related family history includes Brain cancer in her father; Breast cancer in her maternal aunt, paternal aunt, and another family member; Breast cancer (age of onset: 43) in her sister; Cancer in her maternal aunt; Liver cancer in her father.     Review of Systems   Constitutional: Positive for fatigue.   Respiratory: Negative for cough, chest tightness, shortness of breath and wheezing.    Gastrointestinal: Negative for abdominal pain, constipation, diarrhea, nausea and vomiting.   Neurological: Positive for dizziness (Improved from hospitalization). Negative for weakness.   All other systems reviewed and are negative.    Objective      Vitals:    04/10/23 1213   BP: 169/92   Pulse: 59   Resp: 18   Temp: 97.8 °F (36.6 °C)   TempSrc: Temporal   SpO2: 98%   Weight: 71.7 kg (158 lb 1.6 oz)   Height: 167.9 cm (66.1\")   PainSc: 0-No pain     Current Status 4/10/2023   ECOG score 0        Physical Exam    Previous physical exam  GENERAL: Well-developed, well-nourished female in no acute distress using wheelchair for ambulation  SKIN: Warm, dry without rashes, purpura or petechiae.   HEAD: Normocephalic.   EYES: Pupils equal, round and reactive to light. EOMs intact. Conjunctivae normal.   EARS: Hearing intact.   NOSE: Septum midline. No excoriations or nasal discharge.   MOUTH: Tongue is well-papillated; no stomatitis or ulcers. Lips normal.   NECK: Supple with good range of motion; no thyromegaly or masses, no JVD or bruits.   LYMPHATICS: No cervical, supraclavicular, axillary or inguinal adenopathy.   CHEST: Lungs clear to auscultation.   CARDIAC: Regular rate and rhythm without murmurs, rubs or gallops.   ABDOMEN: Soft, nontender with no organomegaly or masses.   EXTREMITIES: No clubbing, cyanosis or edema.   NEUROLOGICAL: No focal neurological deficits.    I have reexamined the patient and the results are consistent with the previously documented exam. MOHAN Godinez "         RECENT LABS:  Hematology WBC   Date Value Ref Range Status   04/10/2023 4.18 3.40 - 10.80 10*3/mm3 Final   02/28/2023 4.33 (L) 4.5 - 11.0 10*3/uL Final     RBC   Date Value Ref Range Status   04/10/2023 4.82 3.77 - 5.28 10*6/mm3 Final   02/28/2023 5.12 4.0 - 5.2 10*6/uL Final     Hemoglobin   Date Value Ref Range Status   04/10/2023 14.1 12.0 - 15.9 g/dL Final   02/28/2023 13.8 12.0 - 16.0 g/dL Final     Hematocrit   Date Value Ref Range Status   04/10/2023 40.9 34.0 - 46.6 % Final   02/28/2023 42.9 36.0 - 46.0 % Final     Platelets   Date Value Ref Range Status   04/10/2023 167 140 - 450 10*3/mm3 Final   02/28/2023 228 140 - 440 10*3/uL Final          Assessment & Plan   82-year-old female with:   1. Stage I breast carcinoma, status post initiation of Arimidex on 12/15/2011. She is tolerated this well and the drug was discontinued December 2016.  2. Erythrocytosis; consistent with hypertension and treatment thereof. Her current labs studies are   acceptable.   3.  Patient referred back for progressive leukopenia, reviewed 10/6/2022 with pancytopenia developing with nutritional losses as a result of ongoing emotional distress with her 's illness  · Repeat laboratory testing t including CMP, flow cytometry peripheral blood, ferritin, iron profile, folate, LDH, MMA B12 levels  · 1 to 2-week follow-up MD, potential bone marrow aspirate and biopsy to follow.  Patient, and her daughter, agree with plan and follow-up.  ·  Subsequent laboratory studies without directive findings  · Patient hospitalized 10/20/2022 at [facility] with right lower lobe dense pneumonia treated with IV antibiotic therapy, discharged on Augmentin and Tessalon Perles  · Telephone visit 10/24/2022 with plans made for transfusion- hemoglobin 7.7 g% at discharge  · Follow-up visit 1 to 2 weeks, CBC, likely follow-up chest x-ray, potential marrow examination pending her repeat peripheral blood smear exam.  · Patient admitted  10/25 - 11/9/2022 for near syncope.  She underwent a marrow aspirate and biopsy 10/27/2022 demonstrating normal cellularity at 30% trilineage hematopoiesis with studies suggesting an atypical monocytic proliferation.  At this point cytogenetics and NGS are pending repeat CBC studies are at acceptable.  · Every 2 weeks review until patient seen back in 4 to 6 weeks  · Patient reassessed 12/12/2022 with further improvement of peripheral blood counts, subsequent NGS consistent with early CMML versus MDS  · 4/10/23 counts are normal today with WBC 4.18, hgb 14.1 and plt 167,000.  Patient continues with fatigue which she feels is more so loneliness.  She denies recents fevers or infection.       3.  Osteopenia-recent bone density 4/23/2021 continues to demonstrate osteopenia unchanged from 12/13/2/16.  We have discussed a trial of Prolia which will be initiated 5/24/2021.  A follow-up dose will be given 11/29/2021 patient seen back in 6 months.  She is seen 12/12/2022 due for her subsequent Prolia her subsequent bone density will not be necessary now until late April 2023.  · DEXA  Ordered today      Plan:  *return in 2 monnths for MD review with CBC, CMP, MG, PHOS, DEXA review and prolia  *DEXA ordered today

## 2023-05-09 ENCOUNTER — HOSPITAL ENCOUNTER (OUTPATIENT)
Dept: BONE DENSITY | Facility: HOSPITAL | Age: 83
Discharge: HOME | End: 2023-05-09
Admitting: NURSE PRACTITIONER
Payer: MEDICARE

## 2023-05-09 DIAGNOSIS — M85.89 OSTEOPENIA OF MULTIPLE SITES: ICD-10-CM

## 2023-05-09 PROCEDURE — 77080 DXA BONE DENSITY AXIAL: CPT

## 2024-01-29 NOTE — PROGRESS NOTES
REASONS FOR FOLLOWUP:  Stage I breast carcinoma, status post initiation of Arimidex on 12/15/2011.   ? Erythrocytosis in the setting of recent syncopal episode reviewed 01/13/2014.   JAK2 analysis negative, slow improvement per hemoglobin and hematocrit. Low erythropoietin level noted, vestibular rehab successful in reducing dizziness.   Erythrocytosis analyses negative for myeloproliferative disorder, improvement per blood pressure med ication adjustment noted.   Patient seen 12/17/2014, stable. A 12-month followup is planned/Arimidex anticipated until 2016.   Patient seen 01/19/2016, additional DEXA scan and followup assessment December 2016 with anticipated completion of Arimidex.   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  Patient reviewed October 20, 2019, continued issues with her left eye recognized  Patient assessed February 28 2020 stable clinically, plans for subsequent bone density as she approaches 10 years from diagnosis   Patient assessed March 1, 2021, vitamin D increased to 3000 is daily, bone density scheduled April 2021,?  Prolia  Patient assessed 5/24/2021 with ongoing osteopenia, trial Prolia initiated.  Patient seen 11/29/2021, stable clinically, Prolia to continue.  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 After delay initially, as a result of abscess petra-surgically, she was able to proceed with radiation therapy. We saw her late in December with bone jack burrows showing  mild osteopenia. As a result of this, she began Arimidex at approximately the same time. She was seen on 3/26/12 doing well on her medication and completed radiation therapy, and was actually improving in general at that point. We asked he r to continue Arimidex for an additional 4 months and seen on 8/9/12 doing well.       Thereafter she was asked to be reviewed again approximately six months later, 01/22/2013. She was having no difficulty with her Arimidex and we planned a yearly return.       She is now seen 01/13/2014 and though she it does not appear she is having trouble with the Arimidex, she does recognize two episodes of syncope that led to a hospitalization and adjustment of her blood pressure medications the last several months. She has no t had any additional episodes since last seen, but it is noted that her hemoglobin and hematocrit are increasing in levels.       The patient was reviewed 01/12/2013 with JAK2 analysis, in particular found to be negative for [id number] point mutation. Additional kevin dies, however, show a reduced erythropoietin level down to 5.4, normal iron studies, normal B12 studies, and normal serum chemistry.       The patient returns today, she states that she has gone on and been assessed for vestibular dysfunction, and through additional rehab and exercise she no longer has the dizziness that she had previously, in fact feels somewhat better than previous.       The patient when reviewed 03/12/2014 had gradual escalation of hemoglobin and hematocrit. 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 These were all normal. As the patient returns back today, 06/04/2014, she is feeling somewhat better overall as result of blood pressure medication adjustment and it is likely that hemoconcentration was the reason for her increasing hemoglobin and hematocrit levels.       The patient thereafter was  asked to return for followup and is now seen in December 2014. She is doing very well overall with no additional symptoms.       The patient was asked to see him back now reviewed 01/19/2016 doing well overall. She recognizes that she is very likely going to complete Arimidex by December of this year.    The patient is now reviewed December 20, 2016.  We plan to discontinue her Arimidex today.  A follow-up bone density was performed December 13 revealing evidence of osteopenia-lumbar T score -1.7 with a 4.6% interval decrease, left hip density mL neck with a T score -1.3 with a 4.4% interval decrease in a right hip density T score -1.7 with a 9.1% interval decrease.  We have discussed with discontinuance of her AI therapy that this should improve.   The patient is next seen January 22, 2018.  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  She is ordered had the same process involving her right eye though this improved with corneal transplant previously.  She follows with ophthalmology regularly.    The patient's next seen December 28, 2019.  Her left eye is to the point that she is having very poor vision and she has been told this nothing now second be done to improve this.  She still is able to function howeve  The patient is next seen February 28, 2020 fortunately doing about the same and approaching 10 years from diagnosis.  We discussed her previous bone density done in 2015 demonstrating osteopenia.  This will need follow-up.  The patient is seen March 1, 2021 and was to have a bone density that was rescheduled as result of the icy roads.  Her subsequent testing does include a low normal vitamin D level.  Fortunately she is feeling well remains quite active and is status post COVID-19 vaccination.  The patient's neck seen back 5/24/2021 having supplemented further her vitamin D and, fortunately, feeling well.  She did have a fall several months ago and fortunately  did not sustain any fracture but feels her balance is suspect.  Her repeat bone density continues to demonstrate osteopenia and as result of history we discussed a trial of Prolia which she is willing to undergo treatment with.  The patient went on to take Prolia and is next seen back 11/29/2021 indicating that she had no side effects from its use and is generally feeling fairly well.    The patient had follow-up with primary care with a slow reduction of her blood count particularly in the last 6 months and she is next seen 10/6/2022 with anemia, leukopenia (neutropenia) and degree of lymphocytosis.    She is seen back in office with her daughter and granddaughter all indicating that the patient is under considerable emotional distress with her  in the hospital with a series of post COVID complications.  She has not been eating well or consistently as result has lost approximately 5 pounds.  We have discussed that her findings hematologically are somewhat concerning and that indirect testing should least be initiated down to looking for nutritional causes or perhaps progression from her previous myeloproliferative disorder (recognized her history) to a mild dysplastic syndrome or even acute leukemia.    The patient subsequent assessments included a normal folate, ferritin of 787, iron saturation of 12%, normal LDH, flow cytometry without evidence of acute leukemia or lymphoma, normal CMP.    The patient is contacted by telephone 10/24/2022 with indication that she has had a further decline.  Evidently she worsened with further weakness and cough development eventually presenting to hospital 10/20/2022 after being seen by her PCP and referred to the ER.  She tested negative for COVID, lactic acid was 2.5, creatinine 1.5, hemoglobin 9.1 and CT chest revealed dense right lower lobe consolidation with partial collapse compatible with pneumonia.  She received IV antibiotic therapies during hospitalization and  apparently improved enough to be discharged home with oral Augmentin and Tessalon Perles.  Her daughter indicates that she is still quite weak and could not come to the office today as result.  Her breathing and general performance status have not changed substantially however.     The patient required admission 10/25 - 11/9/2022 for near syncope.  We saw her in consultation and had her undergo bone marrow aspirate and biopsy 10/27/2022 demonstrating a normocellular marrow 30% trilineage monoparesis and decreased iron stores.  There is no evidence of leukemia documented though studies did suggest an atypical monocytic proliferation, no clear evidence of MDS was determined.  Her subsequent studies included CBC including 11/6/2022 with H&H of 9.7 and 30.0, white count 2880, platelet count 309,000.    The patient is next seen 11/14/2022 having slowly improved and seen with her daughter.  She is now on Florinef with less orthostasis and able to ambulate with a walker.  Hematologically her studies remain essentially stable and we have talked about the development of CMML and likely an early myelodysplastic process.  Subsequent NGS findings include low-level mutations in TET 2 gene, and ASXL1 genes.  These findings are consistent with an early myelodysplastic syndrome or chronic myelomonocytic leukemia.    Patient reassessed 12/12/2022 with repeat improving with H&H 11.9 and 38.6, white count 3680 and platelet count of 225,000.  The patient states that she is felt considerably better over the last several weeks though still has lower extremity swelling requiring compression hose.  She is also due for follow-up Prolia and a bone density will next be scheduled in April.    She returns 4/10/23 with hemoglobin 14.1, hematocrit 40..9.  She continues with fatigue.  She is due for a DEXA scan and this is ordered today     The patient is seen 7/7/2023 with bone density 5/9/2023 with lumbar T score -0.6, left hip -1.3, right hip  -1.6.  The left hip has a 5.5% interval decrease.  The patient is doing relatively well in terms of her performance status and we have discussed continuing Prolia with her hematologic studies also essentially unchanged.    The patient is seen 1/30/2024 having recently moved from her home to assisted living.  She is seen with her son who states that she is doing much better with her current location.  We discussed her bone density is not consistent with osteopenia and the patient no longer needing Prolia.     Past Medical History:   Diagnosis Date    Anxiety     Cancer     Stage I, right breast cancer    Depression     Disease of thyroid gland     hypothyroidusm    Erythrocytosis     Hypercholesterolemia     Hypertension     Osteoporosis     osteopenia mild       ONCOLOGIC HISTORY:  (History from previous dates can be found in the separate document.)    Current Outpatient Medications on File Prior to Visit   Medication Sig Dispense Refill    amLODIPine (NORVASC) 5 MG tablet       buPROPion XL (WELLBUTRIN XL) 300 MG 24 hr tablet Take 150 mg by mouth.      calcium carbonate (TUMS) 500 MG chewable tablet Chew 1 tablet Daily.      diphenhydrAMINE-acetaminophen (Tylenol PM Extra Strength)  MG tablet per tablet Take 1 tablet by mouth At Night As Needed for Sleep.      folic acid (FOLVITE) 1 MG tablet Take 1 tablet by mouth Daily.      levothyroxine (SYNTHROID, LEVOTHROID) 100 MCG tablet Take 1 tablet by mouth Daily.      Melatonin 5 MG capsule Take 5 mg by mouth At Night As Needed (sleep).      metoprolol tartrate (LOPRESSOR) 25 MG tablet       mirtazapine (REMERON) 30 MG tablet Take 1 tablet by mouth every night at bedtime.      Multiple Vitamins-Minerals (ICAPS AREDS 2 PO) Take 2 tablets by mouth Daily.      rosuvastatin (CRESTOR) 20 MG tablet Take 1 tablet by mouth Daily.      triamterene-hydrochlorothiazide (MAXZIDE-25) 37.5-25 MG per tablet Take 0.5 tablets by mouth Daily.      venlafaxine XR (EFFEXOR-XR) 75 MG  "24 hr capsule Take 1 capsule by mouth Daily.      Vitamin D, Cholecalciferol, (CHOLECALCIFEROL) 10 MCG (400 UNIT) tablet Take 5 tablets by mouth Daily.      fludrocortisone 0.1 MG tablet Take 1 tablet by mouth Daily for 30 days. 30 tablet 0     No current facility-administered medications on file prior to visit.       ALLERGIES:     Allergies   Allergen Reactions    Ciprofloxacin Unknown - Low Severity       Social History     Socioeconomic History    Marital status:      Spouse name: Sami   Tobacco Use    Smoking status: Never    Smokeless tobacco: Never   Substance and Sexual Activity    Alcohol use: Yes     Comment: 3-5 times per week    Drug use: No    Sexual activity: Defer         Cancer-related family history includes Brain cancer in her father; Breast cancer in her maternal aunt, paternal aunt, and another family member; Breast cancer (age of onset: 43) in her sister; Cancer in her maternal aunt; Liver cancer in her father.     Review of Systems   Constitutional:  Positive for fatigue.   Respiratory:  Negative for cough, chest tightness, shortness of breath and wheezing.    Gastrointestinal:  Negative for abdominal pain, constipation, diarrhea, nausea and vomiting.   Neurological:  Positive for dizziness (Resolved). Negative for weakness.   All other systems reviewed and are negative.    Objective      Vitals:    01/30/24 1333   BP: 152/85   Pulse: 60   Resp: 18   Temp: 97.3 °F (36.3 °C)   TempSrc: Temporal   SpO2: 96%   Weight: 81.9 kg (180 lb 9.6 oz)   Height: 167.9 cm (66.1\")   PainSc: 0-No pain           1/30/2024     1:35 PM   Current Status   ECOG score 1        Physical Exam    Previous physical exam  GENERAL: Well-developed, well-nourished female in no acute distress using wheelchair for ambulation  SKIN: Warm, dry without rashes, purpura or petechiae.   HEAD: Normocephalic.   EYES: Pupils equal, round and reactive to light. EOMs intact. Conjunctivae normal.   EARS: Hearing intact.   NOSE: " Septum midline. No excoriations or nasal discharge.   MOUTH: Tongue is well-papillated; no stomatitis or ulcers. Lips normal.   NECK: Supple with good range of motion; no thyromegaly or masses, no JVD or bruits.   LYMPHATICS: No cervical, supraclavicular, axillary or inguinal adenopathy.   CHEST: Lungs clear to auscultation.   CARDIAC: Regular rate and rhythm without murmurs, rubs or gallops.   ABDOMEN: Soft, nontender with no organomegaly or masses.   EXTREMITIES: No clubbing, cyanosis or edema.   NEUROLOGICAL: No focal neurological deficits.    I have reexamined the patient and the results are consistent with the previously documented exam. Donald Knox MD         RECENT LABS:  Hematology WBC   Date Value Ref Range Status   01/30/2024 5.10 3.40 - 10.80 10*3/mm3 Final   09/07/2023 4.48 (L) 4.5 - 11.0 10*3/uL Final     RBC   Date Value Ref Range Status   01/30/2024 4.76 3.77 - 5.28 10*6/mm3 Final   09/07/2023 5.10 4.0 - 5.2 10*6/uL Final     Hemoglobin   Date Value Ref Range Status   01/30/2024 14.7 12.0 - 15.9 g/dL Final   09/07/2023 15.5 12.0 - 16.0 g/dL Final     Hematocrit   Date Value Ref Range Status   01/30/2024 43.7 34.0 - 46.6 % Final   09/07/2023 45.2 36.0 - 46.0 % Final     Platelets   Date Value Ref Range Status   01/30/2024 196 140 - 450 10*3/mm3 Final   09/07/2023 203 140 - 440 10*3/uL Final          Assessment & Plan   83-year-old female with:   1. Stage I breast carcinoma, status post initiation of Arimidex on 12/15/2011. She is tolerated this well and the drug was discontinued December 2016.  2. Erythrocytosis; consistent with hypertension and treatment thereof. Her current labs studies are   acceptable.   3.  Patient referred back for progressive leukopenia, reviewed 10/6/2022 with pancytopenia developing with nutritional losses as a result of ongoing emotional distress with her 's illness  Repeat laboratory testing t including CMP, flow cytometry peripheral blood, ferritin, iron  profile, folate, LDH, MMA B12 levels  1 to 2-week follow-up MD, potential bone marrow aspirate and biopsy to follow.  Patient, and her daughter, agree with plan and follow-up.   Subsequent laboratory studies without directive findings  Patient hospitalized 10/20/2022 at [facility] with right lower lobe dense pneumonia treated with IV antibiotic therapy, discharged on Augmentin and Tessalon Perles  Telephone visit 10/24/2022 with plans made for transfusion- hemoglobin 7.7 g% at discharge  Follow-up visit 1 to 2 weeks, CBC, likely follow-up chest x-ray, potential marrow examination pending her repeat peripheral blood smear exam.  Patient admitted 10/25 - 11/9/2022 for near syncope.  She underwent a marrow aspirate and biopsy 10/27/2022 demonstrating normal cellularity at 30% trilineage hematopoiesis with studies suggesting an atypical monocytic proliferation.  At this point cytogenetics and NGS are pending repeat CBC studies are at acceptable.  Every 2 weeks review until patient seen back in 4 to 6 weeks  Patient reassessed 12/12/2022 with further improvement of peripheral blood counts, subsequent NGS consistent with early CMML versus MDS  4/10/23 counts are normal today with WBC 4.18, hgb 14.1 and plt 167,000.  Patient continues with fatigue which she feels is more so loneliness.  She denies recents fevers or infection.   Reassessment 7/7/2023 without substantial change hematologically.  Reassessment 1/30/2024 stable hematologically.      3.  Osteopenia-recent bone density 4/23/2021 continues to demonstrate osteopenia unchanged from 12/13/2/16.  We have discussed a trial of Prolia which will be initiated 5/24/2021.  A follow-up dose will be given 11/29/2021 patient seen back in 6 months.  She is seen 12/12/2022 due for her subsequent Prolia her subsequent bone density will not be necessary now until late April 2023.  Bone density 5/9/2023 with lumbar T score -0.6 unchanged, left hip T score -1.3 with 5.5%  interval decrease, right hip -1.6 unchanged-interpreted as osteopenia at the hips.  Send review of bone density and patient assessed 1/3/2024 no longer in need of ongoing Prolia.      Plan:  *Discontinue Prolia    *6-month CBC, RN evaluation    *12 months MD, CBC, CMP

## 2024-01-30 ENCOUNTER — LAB (OUTPATIENT)
Dept: OTHER | Facility: HOSPITAL | Age: 84
End: 2024-01-30
Payer: MEDICARE

## 2024-01-30 ENCOUNTER — OFFICE VISIT (OUTPATIENT)
Dept: ONCOLOGY | Facility: CLINIC | Age: 84
End: 2024-01-30
Payer: MEDICARE

## 2024-01-30 ENCOUNTER — APPOINTMENT (OUTPATIENT)
Dept: ONCOLOGY | Facility: HOSPITAL | Age: 84
End: 2024-01-30
Payer: MEDICARE

## 2024-01-30 VITALS
OXYGEN SATURATION: 96 % | SYSTOLIC BLOOD PRESSURE: 152 MMHG | RESPIRATION RATE: 18 BRPM | HEART RATE: 60 BPM | WEIGHT: 180.6 LBS | BODY MASS INDEX: 29.02 KG/M2 | HEIGHT: 66 IN | DIASTOLIC BLOOD PRESSURE: 85 MMHG | TEMPERATURE: 97.3 F

## 2024-01-30 DIAGNOSIS — C50.011 MALIGNANT NEOPLASM OF AREOLA OF RIGHT BREAST IN FEMALE, UNSPECIFIED ESTROGEN RECEPTOR STATUS: ICD-10-CM

## 2024-01-30 DIAGNOSIS — C50.011 MALIGNANT NEOPLASM OF AREOLA OF RIGHT BREAST IN FEMALE, UNSPECIFIED ESTROGEN RECEPTOR STATUS: Primary | ICD-10-CM

## 2024-01-30 DIAGNOSIS — M81.0 OSTEOPOROSIS WITHOUT CURRENT PATHOLOGICAL FRACTURE, UNSPECIFIED OSTEOPOROSIS TYPE: ICD-10-CM

## 2024-01-30 LAB
ALBUMIN SERPL-MCNC: 4.2 G/DL (ref 3.5–5.2)
ALBUMIN/GLOB SERPL: 1.6 G/DL
ALP SERPL-CCNC: 51 U/L (ref 39–117)
ALT SERPL W P-5'-P-CCNC: 15 U/L (ref 1–33)
ANION GAP SERPL CALCULATED.3IONS-SCNC: 8.7 MMOL/L (ref 5–15)
AST SERPL-CCNC: 23 U/L (ref 1–32)
BASOPHILS # BLD AUTO: 0.03 10*3/MM3 (ref 0–0.2)
BASOPHILS NFR BLD AUTO: 0.6 % (ref 0–1.5)
BILIRUB SERPL-MCNC: 0.4 MG/DL (ref 0–1.2)
BUN SERPL-MCNC: 14 MG/DL (ref 8–23)
BUN/CREAT SERPL: 15.2 (ref 7–25)
CALCIUM SPEC-SCNC: 9.7 MG/DL (ref 8.6–10.5)
CHLORIDE SERPL-SCNC: 95 MMOL/L (ref 98–107)
CO2 SERPL-SCNC: 30.3 MMOL/L (ref 22–29)
CREAT SERPL-MCNC: 0.92 MG/DL (ref 0.57–1)
DEPRECATED RDW RBC AUTO: 43 FL (ref 37–54)
EGFRCR SERPLBLD CKD-EPI 2021: 61.9 ML/MIN/1.73
EOSINOPHIL # BLD AUTO: 0.06 10*3/MM3 (ref 0–0.4)
EOSINOPHIL NFR BLD AUTO: 1.2 % (ref 0.3–6.2)
ERYTHROCYTE [DISTWIDTH] IN BLOOD BY AUTOMATED COUNT: 12.7 % (ref 12.3–15.4)
GLOBULIN UR ELPH-MCNC: 2.6 GM/DL
GLUCOSE SERPL-MCNC: 126 MG/DL (ref 65–99)
HCT VFR BLD AUTO: 43.7 % (ref 34–46.6)
HGB BLD-MCNC: 14.7 G/DL (ref 12–15.9)
IMM GRANULOCYTES # BLD AUTO: 0.01 10*3/MM3 (ref 0–0.05)
IMM GRANULOCYTES NFR BLD AUTO: 0.2 % (ref 0–0.5)
LYMPHOCYTES # BLD AUTO: 1.88 10*3/MM3 (ref 0.7–3.1)
LYMPHOCYTES NFR BLD AUTO: 36.9 % (ref 19.6–45.3)
MAGNESIUM SERPL-MCNC: 1.8 MG/DL (ref 1.6–2.4)
MCH RBC QN AUTO: 30.9 PG (ref 26.6–33)
MCHC RBC AUTO-ENTMCNC: 33.6 G/DL (ref 31.5–35.7)
MCV RBC AUTO: 91.8 FL (ref 79–97)
MONOCYTES # BLD AUTO: 0.62 10*3/MM3 (ref 0.1–0.9)
MONOCYTES NFR BLD AUTO: 12.2 % (ref 5–12)
NEUTROPHILS NFR BLD AUTO: 2.5 10*3/MM3 (ref 1.7–7)
NEUTROPHILS NFR BLD AUTO: 48.9 % (ref 42.7–76)
NRBC BLD AUTO-RTO: 0 /100 WBC (ref 0–0.2)
PHOSPHATE SERPL-MCNC: 4.7 MG/DL (ref 2.5–4.5)
PLATELET # BLD AUTO: 196 10*3/MM3 (ref 140–450)
PMV BLD AUTO: 9.8 FL (ref 6–12)
POTASSIUM SERPL-SCNC: 4.2 MMOL/L (ref 3.5–5.2)
PROT SERPL-MCNC: 6.8 G/DL (ref 6–8.5)
RBC # BLD AUTO: 4.76 10*6/MM3 (ref 3.77–5.28)
SODIUM SERPL-SCNC: 134 MMOL/L (ref 136–145)
WBC NRBC COR # BLD AUTO: 5.1 10*3/MM3 (ref 3.4–10.8)

## 2024-01-30 PROCEDURE — 3077F SYST BP >= 140 MM HG: CPT | Performed by: INTERNAL MEDICINE

## 2024-01-30 PROCEDURE — 84100 ASSAY OF PHOSPHORUS: CPT | Performed by: INTERNAL MEDICINE

## 2024-01-30 PROCEDURE — 83735 ASSAY OF MAGNESIUM: CPT | Performed by: INTERNAL MEDICINE

## 2024-01-30 PROCEDURE — 80053 COMPREHEN METABOLIC PANEL: CPT | Performed by: INTERNAL MEDICINE

## 2024-01-30 PROCEDURE — 85025 COMPLETE CBC W/AUTO DIFF WBC: CPT | Performed by: INTERNAL MEDICINE

## 2024-01-30 PROCEDURE — 99213 OFFICE O/P EST LOW 20 MIN: CPT | Performed by: INTERNAL MEDICINE

## 2024-01-30 PROCEDURE — 3079F DIAST BP 80-89 MM HG: CPT | Performed by: INTERNAL MEDICINE

## 2024-01-30 PROCEDURE — 36415 COLL VENOUS BLD VENIPUNCTURE: CPT

## 2024-01-30 PROCEDURE — 1126F AMNT PAIN NOTED NONE PRSNT: CPT | Performed by: INTERNAL MEDICINE

## 2024-07-30 ENCOUNTER — LAB (OUTPATIENT)
Dept: LAB | Facility: HOSPITAL | Age: 84
End: 2024-07-30
Payer: MEDICARE

## 2024-07-30 ENCOUNTER — CLINICAL SUPPORT (OUTPATIENT)
Dept: ONCOLOGY | Facility: HOSPITAL | Age: 84
End: 2024-07-30
Payer: MEDICARE

## 2024-07-30 DIAGNOSIS — C50.011 MALIGNANT NEOPLASM OF AREOLA OF RIGHT BREAST IN FEMALE, UNSPECIFIED ESTROGEN RECEPTOR STATUS: ICD-10-CM

## 2024-07-30 DIAGNOSIS — C50.011 MALIGNANT NEOPLASM OF AREOLA OF RIGHT BREAST IN FEMALE, UNSPECIFIED ESTROGEN RECEPTOR STATUS: Primary | ICD-10-CM

## 2024-07-30 LAB
BASOPHILS # BLD AUTO: 0.02 10*3/MM3 (ref 0–0.2)
BASOPHILS NFR BLD AUTO: 0.4 % (ref 0–1.5)
DEPRECATED RDW RBC AUTO: 41.3 FL (ref 37–54)
EOSINOPHIL # BLD AUTO: 0.06 10*3/MM3 (ref 0–0.4)
EOSINOPHIL NFR BLD AUTO: 1.3 % (ref 0.3–6.2)
ERYTHROCYTE [DISTWIDTH] IN BLOOD BY AUTOMATED COUNT: 12.6 % (ref 12.3–15.4)
HCT VFR BLD AUTO: 44.5 % (ref 34–46.6)
HGB BLD-MCNC: 15.3 G/DL (ref 12–15.9)
IMM GRANULOCYTES # BLD AUTO: 0.03 10*3/MM3 (ref 0–0.05)
IMM GRANULOCYTES NFR BLD AUTO: 0.7 % (ref 0–0.5)
LYMPHOCYTES # BLD AUTO: 1.12 10*3/MM3 (ref 0.7–3.1)
LYMPHOCYTES NFR BLD AUTO: 24.3 % (ref 19.6–45.3)
MCH RBC QN AUTO: 30.8 PG (ref 26.6–33)
MCHC RBC AUTO-ENTMCNC: 34.4 G/DL (ref 31.5–35.7)
MCV RBC AUTO: 89.7 FL (ref 79–97)
MONOCYTES # BLD AUTO: 0.47 10*3/MM3 (ref 0.1–0.9)
MONOCYTES NFR BLD AUTO: 10.2 % (ref 5–12)
NEUTROPHILS NFR BLD AUTO: 2.91 10*3/MM3 (ref 1.7–7)
NEUTROPHILS NFR BLD AUTO: 63.1 % (ref 42.7–76)
NRBC BLD AUTO-RTO: 0 /100 WBC (ref 0–0.2)
PLATELET # BLD AUTO: 178 10*3/MM3 (ref 140–450)
PMV BLD AUTO: 10 FL (ref 6–12)
RBC # BLD AUTO: 4.96 10*6/MM3 (ref 3.77–5.28)
WBC NRBC COR # BLD AUTO: 4.61 10*3/MM3 (ref 3.4–10.8)

## 2024-07-30 PROCEDURE — 36415 COLL VENOUS BLD VENIPUNCTURE: CPT

## 2024-07-30 PROCEDURE — 85025 COMPLETE CBC W/AUTO DIFF WBC: CPT

## 2024-07-30 NOTE — PROGRESS NOTES
WBC   Date Value Ref Range Status   07/30/2024 4.61 3.40 - 10.80 10*3/mm3 Final   06/25/2024 4.96 4.5 - 11.0 10*3/uL Final     RBC   Date Value Ref Range Status   07/30/2024 4.96 3.77 - 5.28 10*6/mm3 Final   06/25/2024 4.70 4.0 - 5.2 10*6/uL Final     Hemoglobin   Date Value Ref Range Status   07/30/2024 15.3 12.0 - 15.9 g/dL Final   06/25/2024 14.3 12.0 - 16.0 g/dL Final     Hematocrit   Date Value Ref Range Status   07/30/2024 44.5 34.0 - 46.6 % Final   06/25/2024 43.4 36.0 - 46.0 % Final     MCV   Date Value Ref Range Status   07/30/2024 89.7 79.0 - 97.0 fL Final   06/25/2024 92.3 80.0 - 100.0 fL Final     MCH   Date Value Ref Range Status   07/30/2024 30.8 26.6 - 33.0 pg Final   06/25/2024 30.4 26.0 - 34.0 pg Final     MCHC   Date Value Ref Range Status   07/30/2024 34.4 31.5 - 35.7 g/dL Final   06/25/2024 32.9 31.0 - 37.0 g/dL Final     RDW   Date Value Ref Range Status   07/30/2024 12.6 12.3 - 15.4 % Final   06/25/2024 13.5 12.0 - 16.8 % Final     RDW-SD   Date Value Ref Range Status   07/30/2024 41.3 37.0 - 54.0 fl Final     MPV   Date Value Ref Range Status   07/30/2024 10.0 6.0 - 12.0 fL Final   06/25/2024 11.5 8.4 - 12.4 fL Final     Platelets   Date Value Ref Range Status   07/30/2024 178 140 - 450 10*3/mm3 Final   06/25/2024 154 140 - 440 10*3/uL Final     Neutrophil Rel %   Date Value Ref Range Status   06/25/2024 57.7 45 - 80 % Final     Neutrophil %   Date Value Ref Range Status   07/30/2024 63.1 42.7 - 76.0 % Final     Lymphocyte Rel %   Date Value Ref Range Status   06/25/2024 27.0 15 - 50 % Final     Lymphocyte %   Date Value Ref Range Status   07/30/2024 24.3 19.6 - 45.3 % Final     Monocyte Rel %   Date Value Ref Range Status   06/25/2024 13.9 0 - 15 % Final     Monocyte %   Date Value Ref Range Status   07/30/2024 10.2 5.0 - 12.0 % Final     Eosinophil %   Date Value Ref Range Status   07/30/2024 1.3 0.3 - 6.2 % Final   06/25/2024 1.0 0 - 7 % Final     Basophil Rel %   Date Value Ref Range  Status   06/25/2024 0.2 0 - 2 % Final     Basophil %   Date Value Ref Range Status   07/30/2024 0.4 0.0 - 1.5 % Final     Immature Grans %   Date Value Ref Range Status   07/30/2024 0.7 (H) 0.0 - 0.5 % Final   06/25/2024 0.2 0.0 - 1.0 % Final     Neutrophils Absolute   Date Value Ref Range Status   06/25/2024 2.86 2.0 - 8.8 10*3/uL Final     Neutrophils, Absolute   Date Value Ref Range Status   07/30/2024 2.91 1.70 - 7.00 10*3/mm3 Final     Lymphocytes Absolute   Date Value Ref Range Status   06/25/2024 1.34 0.7 - 5.5 10*3/uL Final     Lymphocytes, Absolute   Date Value Ref Range Status   07/30/2024 1.12 0.70 - 3.10 10*3/mm3 Final     Monocytes Absolute   Date Value Ref Range Status   06/25/2024 0.69 0.0 - 1.7 10*3/uL Final     Monocytes, Absolute   Date Value Ref Range Status   07/30/2024 0.47 0.10 - 0.90 10*3/mm3 Final     Eosinophils Absolute   Date Value Ref Range Status   06/25/2024 0.05 0.0 - 0.8 10*3/uL Final     Eosinophils, Absolute   Date Value Ref Range Status   07/30/2024 0.06 0.00 - 0.40 10*3/mm3 Final     Basophils Absolute   Date Value Ref Range Status   06/25/2024 0.01 0.0 - 0.2 10*3/uL Final     Basophils, Absolute   Date Value Ref Range Status   07/30/2024 0.02 0.00 - 0.20 10*3/mm3 Final     Immature Grans, Absolute   Date Value Ref Range Status   07/30/2024 0.03 0.00 - 0.05 10*3/mm3 Final   06/25/2024 0.01 0.00 - 0.10 10*3/uL Final     nRBC   Date Value Ref Range Status   07/30/2024 0.0 0.0 - 0.2 /100 WBC Final      Went and took a copy of the patients labs to the waiting room. Patient was accompanied by her daughter and we went over her CBC and I let her know her counts are all stable at this time. Patient had no complaints and her daughter took a copy of her labs. I asked them to please call us back if they felt as if she should be sooner than her next scheduled appointment. Patients daughter and granddaughter wheeled her out in her wheelchair.

## 2025-03-17 ENCOUNTER — TELEPHONE (OUTPATIENT)
Dept: ONCOLOGY | Facility: CLINIC | Age: 85
End: 2025-03-17
Payer: MEDICARE

## 2025-03-21 DIAGNOSIS — C50.011 MALIGNANT NEOPLASM OF AREOLA OF RIGHT BREAST IN FEMALE, UNSPECIFIED ESTROGEN RECEPTOR STATUS: Primary | ICD-10-CM

## 2025-03-21 NOTE — PROGRESS NOTES
REASONS FOR FOLLOWUP:  Stage I breast carcinoma, status post initiation of Arimidex on 12/15/2011.   ? Erythrocytosis in the setting of recent syncopal episode reviewed 01/13/2014.   JAK2 analysis negative, slow improvement per hemoglobin and hematocrit. Low erythropoietin level noted, vestibular rehab successful in reducing dizziness.   Erythrocytosis analyses negative for myeloproliferative disorder, improvement per blood pressure med ication adjustment noted.   Patient seen 12/17/2014, stable. A 12-month followup is planned/Arimidex anticipated until 2016.   Patient seen 01/19/2016, additional DEXA scan and followup assessment December 2016 with anticipated completion of Arimidex.   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  Patient reviewed October 20, 2019, continued issues with her left eye recognized  Patient assessed February 28 2020 stable clinically, plans for subsequent bone density as she approaches 10 years from diagnosis   Patient assessed March 1, 2021, vitamin D increased to 3000 is daily, bone density scheduled April 2021,?  Prolia  Patient assessed 5/24/2021 with ongoing osteopenia, trial Prolia initiated.  Patient seen 11/29/2021, stable clinically, Prolia to continue.  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 After delay initially, as a result of abscess petra-surgically, she was able to proceed with radiation therapy. We saw her late in December with bone jack burrows showing  mild osteopenia. As a result of this, she began Arimidex at approximately the same time. She was seen on 3/26/12 doing well on her medication and completed radiation therapy, and was actually improving in general at that point. We asked he r to continue Arimidex for an additional 4 months and seen on 8/9/12 doing well.       Thereafter she was asked to be reviewed again approximately six months later, 01/22/2013. She was having no difficulty with her Arimidex and we planned a yearly return.       She is now seen 01/13/2014 and though she it does not appear she is having trouble with the Arimidex, she does recognize two episodes of syncope that led to a hospitalization and adjustment of her blood pressure medications the last several months. She has no t had any additional episodes since last seen, but it is noted that her hemoglobin and hematocrit are increasing in levels.       The patient was reviewed 01/12/2013 with JAK2 analysis, in particular found to be negative for [id number] point mutation. Additional kevin dies, however, show a reduced erythropoietin level down to 5.4, normal iron studies, normal B12 studies, and normal serum chemistry.       The patient returns today, she states that she has gone on and been assessed for vestibular dysfunction, and through additional rehab and exercise she no longer has the dizziness that she had previously, in fact feels somewhat better than previous.       The patient when reviewed 03/12/2014 had gradual escalation of hemoglobin and hematocrit. 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 These were all normal. As the patient returns back today, 06/04/2014, she is feeling somewhat better overall as result of blood pressure medication adjustment and it is likely that hemoconcentration was the reason for her increasing hemoglobin and hematocrit levels.       The patient thereafter was  asked to return for followup and is now seen in December 2014. She is doing very well overall with no additional symptoms.       The patient was asked to see him back now reviewed 01/19/2016 doing well overall. She recognizes that she is very likely going to complete Arimidex by December of this year.    The patient is now reviewed December 20, 2016.  We plan to discontinue her Arimidex today.  A follow-up bone density was performed December 13 revealing evidence of osteopenia-lumbar T score -1.7 with a 4.6% interval decrease, left hip density mL neck with a T score -1.3 with a 4.4% interval decrease in a right hip density T score -1.7 with a 9.1% interval decrease.  We have discussed with discontinuance of her AI therapy that this should improve.   The patient is next seen January 22, 2018.  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  She is ordered had the same process involving her right eye though this improved with corneal transplant previously.  She follows with ophthalmology regularly.    The patient's next seen December 28, 2019.  Her left eye is to the point that she is having very poor vision and she has been told this nothing now second be done to improve this.  She still is able to function howeve  The patient is next seen February 28, 2020 fortunately doing about the same and approaching 10 years from diagnosis.  We discussed her previous bone density done in 2015 demonstrating osteopenia.  This will need follow-up.  The patient is seen March 1, 2021 and was to have a bone density that was rescheduled as result of the icy roads.  Her subsequent testing does include a low normal vitamin D level.  Fortunately she is feeling well remains quite active and is status post COVID-19 vaccination.  The patient's neck seen back 5/24/2021 having supplemented further her vitamin D and, fortunately, feeling well.  She did have a fall several months ago and fortunately  did not sustain any fracture but feels her balance is suspect.  Her repeat bone density continues to demonstrate osteopenia and as result of history we discussed a trial of Prolia which she is willing to undergo treatment with.  The patient went on to take Prolia and is next seen back 11/29/2021 indicating that she had no side effects from its use and is generally feeling fairly well.    The patient had follow-up with primary care with a slow reduction of her blood count particularly in the last 6 months and she is next seen 10/6/2022 with anemia, leukopenia (neutropenia) and degree of lymphocytosis.    She is seen back in office with her daughter and granddaughter all indicating that the patient is under considerable emotional distress with her  in the hospital with a series of post COVID complications.  She has not been eating well or consistently as result has lost approximately 5 pounds.  We have discussed that her findings hematologically are somewhat concerning and that indirect testing should least be initiated down to looking for nutritional causes or perhaps progression from her previous myeloproliferative disorder (recognized her history) to a mild dysplastic syndrome or even acute leukemia.    The patient subsequent assessments included a normal folate, ferritin of 787, iron saturation of 12%, normal LDH, flow cytometry without evidence of acute leukemia or lymphoma, normal CMP.    The patient is contacted by telephone 10/24/2022 with indication that she has had a further decline.  Evidently she worsened with further weakness and cough development eventually presenting to hospital 10/20/2022 after being seen by her PCP and referred to the ER.  She tested negative for COVID, lactic acid was 2.5, creatinine 1.5, hemoglobin 9.1 and CT chest revealed dense right lower lobe consolidation with partial collapse compatible with pneumonia.  She received IV antibiotic therapies during hospitalization and  apparently improved enough to be discharged home with oral Augmentin and Tessalon Perles.  Her daughter indicates that she is still quite weak and could not come to the office today as result.  Her breathing and general performance status have not changed substantially however.     The patient required admission 10/25 - 11/9/2022 for near syncope.  We saw her in consultation and had her undergo bone marrow aspirate and biopsy 10/27/2022 demonstrating a normocellular marrow 30% trilineage monoparesis and decreased iron stores.  There is no evidence of leukemia documented though studies did suggest an atypical monocytic proliferation, no clear evidence of MDS was determined.  Her subsequent studies included CBC including 11/6/2022 with H&H of 9.7 and 30.0, white count 2880, platelet count 309,000.    The patient is next seen 11/14/2022 having slowly improved and seen with her daughter.  She is now on Florinef with less orthostasis and able to ambulate with a walker.  Hematologically her studies remain essentially stable and we have talked about the development of CMML and likely an early myelodysplastic process.  Subsequent NGS findings include low-level mutations in TET 2 gene, and ASXL1 genes.  These findings are consistent with an early myelodysplastic syndrome or chronic myelomonocytic leukemia.    Patient reassessed 12/12/2022 with repeat improving with H&H 11.9 and 38.6, white count 3680 and platelet count of 225,000.  The patient states that she is felt considerably better over the last several weeks though still has lower extremity swelling requiring compression hose.  She is also due for follow-up Prolia and a bone density will next be scheduled in April.    She returns 4/10/23 with hemoglobin 14.1, hematocrit 40..9.  She continues with fatigue.  She is due for a DEXA scan and this is ordered today     The patient is seen 7/7/2023 with bone density 5/9/2023 with lumbar T score -0.6, left hip -1.3, right hip  -1.6.  The left hip has a 5.5% interval decrease.  The patient is doing relatively well in terms of her performance status and we have discussed continuing Prolia with her hematologic studies also essentially unchanged.    The patient is seen 1/30/2024 having recently moved from her home to assisted living.  She is seen with her son who states that she is doing much better with her current location.  We discussed her bone density is not consistent with osteopenia and the patient no longer needing Prolia.    The patient is next seen 3/24/2025 having undergone right total knee replacement 12/17/2024.  She did well without complication and continues rehabilitation.  Follow-up hematology studies 3/24/2025 rheumatology normal H&H of 14.7 and 44.5, white count of 5410, platelet count of 209,000.     Past Medical History:   Diagnosis Date    Anxiety     Cancer     Stage I, right breast cancer    Depression     Disease of thyroid gland     hypothyroidusm    Erythrocytosis     Hypercholesterolemia     Hypertension     Osteoporosis     osteopenia mild       ONCOLOGIC HISTORY:  (History from previous dates can be found in the separate document.)    Current Outpatient Medications on File Prior to Visit   Medication Sig Dispense Refill    amLODIPine (NORVASC) 5 MG tablet       buPROPion XL (WELLBUTRIN XL) 300 MG 24 hr tablet Take 150 mg by mouth.      calcium carbonate (TUMS) 500 MG chewable tablet Chew 1 tablet Daily.      diphenhydrAMINE-acetaminophen (Tylenol PM Extra Strength)  MG tablet per tablet Take 1 tablet by mouth At Night As Needed for Sleep.      folic acid (FOLVITE) 1 MG tablet Take 1 tablet by mouth Daily.      levothyroxine (SYNTHROID, LEVOTHROID) 100 MCG tablet Take 1 tablet by mouth Daily.      Melatonin 5 MG capsule Take 5 mg by mouth At Night As Needed (sleep).      metoprolol tartrate (LOPRESSOR) 25 MG tablet       mirtazapine (REMERON) 30 MG tablet Take 1 tablet by mouth every night at bedtime.       "Multiple Vitamins-Minerals (ICAPS AREDS 2 PO) Take 2 tablets by mouth Daily.      rosuvastatin (CRESTOR) 20 MG tablet Take 1 tablet by mouth Daily.      triamterene-hydrochlorothiazide (MAXZIDE-25) 37.5-25 MG per tablet Take 0.5 tablets by mouth Daily.      venlafaxine XR (EFFEXOR-XR) 75 MG 24 hr capsule Take 1 capsule by mouth Daily.      Vitamin D, Cholecalciferol, (CHOLECALCIFEROL) 10 MCG (400 UNIT) tablet Take 5 tablets by mouth Daily.      fludrocortisone 0.1 MG tablet Take 1 tablet by mouth Daily for 30 days. 30 tablet 0     No current facility-administered medications on file prior to visit.       ALLERGIES:     Allergies   Allergen Reactions    Ciprofloxacin Unknown - Low Severity       Social History     Socioeconomic History    Marital status:      Spouse name: Sami   Tobacco Use    Smoking status: Never    Smokeless tobacco: Never   Substance and Sexual Activity    Alcohol use: Yes     Comment: 3-5 times per week    Drug use: No    Sexual activity: Defer         Cancer-related family history includes Brain cancer in her father; Breast cancer in her maternal aunt, paternal aunt, and another family member; Breast cancer (age of onset: 43) in her sister; Cancer in her maternal aunt; Liver cancer in her father.     Review of Systems   Constitutional:  Positive for fatigue.   Respiratory:  Negative for cough, chest tightness, shortness of breath and wheezing.    Gastrointestinal:  Negative for abdominal pain, constipation, diarrhea, nausea and vomiting.   Neurological:  Positive for dizziness (Resolved). Negative for weakness.   All other systems reviewed and are negative.    Objective      Vitals:    03/24/25 1518   BP: 128/83   Pulse: 68   Resp: 16   Temp: 98.1 °F (36.7 °C)   TempSrc: Oral   SpO2: 95%   Weight: 88.1 kg (194 lb 3.2 oz)   Height: 167.6 cm (66\")   PainSc: 0-No pain             3/24/2025     3:10 PM   Current Status   ECOG score 1        Physical Exam    Previous physical " exam  GENERAL: Well-developed, well-nourished female in no acute distress using wheelchair for ambulation  SKIN: Warm, dry without rashes, purpura or petechiae.   HEAD: Normocephalic.   EYES: Pupils equal, round and reactive to light. EOMs intact. Conjunctivae normal.   EARS: Hearing intact.   NOSE: Septum midline. No excoriations or nasal discharge.   MOUTH: Tongue is well-papillated; no stomatitis or ulcers. Lips normal.   NECK: Supple with good range of motion; no thyromegaly or masses, no JVD or bruits.   LYMPHATICS: No cervical, supraclavicular, axillary or inguinal adenopathy.   CHEST: Lungs clear to auscultation.   CARDIAC: Regular rate and rhythm without murmurs, rubs or gallops.   ABDOMEN: Soft, nontender with no organomegaly or masses.   EXTREMITIES: No clubbing, cyanosis or edema.   NEUROLOGICAL: No focal neurological deficits.    I have reexamined the patient and the results are consistent with the previously documented exam. Donald Knox MD         RECENT LABS:  Hematology WBC   Date Value Ref Range Status   03/24/2025 5.41 3.40 - 10.80 10*3/mm3 Final   09/20/2024 5.88 4.5 - 11.0 10*3/uL Final     RBC   Date Value Ref Range Status   03/24/2025 4.96 3.77 - 5.28 10*6/mm3 Final   09/20/2024 4.6 4.0 - 5.2 10*6/uL Final     Hemoglobin   Date Value Ref Range Status   03/24/2025 14.7 12.0 - 15.9 g/dL Final   09/20/2024 13.8 12.0 - 16.0 g/dL Final     Hematocrit   Date Value Ref Range Status   03/24/2025 44.5 34.0 - 46.6 % Final   09/20/2024 39.9 36.0 - 46.0 % Final     Platelets   Date Value Ref Range Status   03/24/2025 209 140 - 450 10*3/mm3 Final   09/20/2024 203 140 - 440 10*3/uL Final          Assessment & Plan   84-year-old female with:   1. Stage I breast carcinoma, status post initiation of Arimidex on 12/15/2011. She is tolerated this well and the drug was discontinued December 2016.  2. Erythrocytosis; consistent with hypertension and treatment thereof. Her current labs studies are    acceptable.   3.  Patient referred back for progressive leukopenia, reviewed 10/6/2022 with pancytopenia developing with nutritional losses as a result of ongoing emotional distress with her 's illness  Repeat laboratory testing t including CMP, flow cytometry peripheral blood, ferritin, iron profile, folate, LDH, MMA B12 levels  1 to 2-week follow-up MD, potential bone marrow aspirate and biopsy to follow.  Patient, and her daughter, agree with plan and follow-up.   Subsequent laboratory studies without directive findings  Patient hospitalized 10/20/2022 at [facility] with right lower lobe dense pneumonia treated with IV antibiotic therapy, discharged on Augmentin and Tessalon Perles  Telephone visit 10/24/2022 with plans made for transfusion- hemoglobin 7.7 g% at discharge  Follow-up visit 1 to 2 weeks, CBC, likely follow-up chest x-ray, potential marrow examination pending her repeat peripheral blood smear exam.  Patient admitted 10/25 - 11/9/2022 for near syncope.  She underwent a marrow aspirate and biopsy 10/27/2022 demonstrating normal cellularity at 30% trilineage hematopoiesis with studies suggesting an atypical monocytic proliferation.  At this point cytogenetics and NGS are pending repeat CBC studies are at acceptable.  Every 2 weeks review until patient seen back in 4 to 6 weeks  Patient reassessed 12/12/2022 with further improvement of peripheral blood counts, subsequent NGS consistent with early CMML versus MDS  4/10/23 counts are normal today with WBC 4.18, hgb 14.1 and plt 167,000.  Patient continues with fatigue which she feels is more so loneliness.  She denies recents fevers or infection.   Reassessment 7/7/2023 without substantial change hematologically.  Reassessment 1/30/2024 stable hematologically.  The patient is next seen 3/24/2025 having undergone right total knee replacement 12/17/2024.  She did well without complication and continues rehabilitation.  Follow-up hematology  studies 3/24/2025 rheumatology normal H&H of 14.7 and 44.5, white count of 5410, platelet count of 209,000.        3.  Osteopenia-recent bone density 4/23/2021 continues to demonstrate osteopenia unchanged from 12/13/2/16.  We have discussed a trial of Prolia which will be initiated 5/24/2021.  A follow-up dose will be given 11/29/2021 patient seen back in 6 months.  She is seen 12/12/2022 due for her subsequent Prolia her subsequent bone density will not be necessary now until late April 2023.  Bone density 5/9/2023 with lumbar T score -0.6 unchanged, left hip T score -1.3 with 5.5% interval decrease, right hip -1.6 unchanged-interpreted as osteopenia at the hips.  Send review of bone density and patient assessed 1/3/2024 no longer in need of ongoing Prolia.      Plan:    *12 months MD, CBC, CMP

## 2025-03-24 ENCOUNTER — OFFICE VISIT (OUTPATIENT)
Dept: ONCOLOGY | Facility: CLINIC | Age: 85
End: 2025-03-24
Payer: MEDICARE

## 2025-03-24 ENCOUNTER — LAB (OUTPATIENT)
Dept: LAB | Facility: HOSPITAL | Age: 85
End: 2025-03-24
Payer: MEDICARE

## 2025-03-24 VITALS
HEIGHT: 66 IN | SYSTOLIC BLOOD PRESSURE: 128 MMHG | TEMPERATURE: 98.1 F | RESPIRATION RATE: 16 BRPM | BODY MASS INDEX: 31.21 KG/M2 | OXYGEN SATURATION: 95 % | DIASTOLIC BLOOD PRESSURE: 83 MMHG | WEIGHT: 194.2 LBS | HEART RATE: 68 BPM

## 2025-03-24 DIAGNOSIS — C50.011 MALIGNANT NEOPLASM OF AREOLA OF RIGHT BREAST IN FEMALE, UNSPECIFIED ESTROGEN RECEPTOR STATUS: ICD-10-CM

## 2025-03-24 DIAGNOSIS — D64.9 SYMPTOMATIC ANEMIA: Primary | ICD-10-CM

## 2025-03-24 LAB
ALBUMIN SERPL-MCNC: 3.9 G/DL (ref 3.5–5.2)
ALBUMIN/GLOB SERPL: 1.4 G/DL
ALP SERPL-CCNC: 126 U/L (ref 39–117)
ALT SERPL W P-5'-P-CCNC: 14 U/L (ref 1–33)
ANION GAP SERPL CALCULATED.3IONS-SCNC: 9.9 MMOL/L (ref 5–15)
AST SERPL-CCNC: 18 U/L (ref 1–32)
BASOPHILS # BLD AUTO: 0.03 10*3/MM3 (ref 0–0.2)
BASOPHILS NFR BLD AUTO: 0.6 % (ref 0–1.5)
BILIRUB SERPL-MCNC: 0.5 MG/DL (ref 0–1.2)
BUN SERPL-MCNC: 12 MG/DL (ref 8–23)
BUN/CREAT SERPL: 13.3 (ref 7–25)
CALCIUM SPEC-SCNC: 9.8 MG/DL (ref 8.6–10.5)
CHLORIDE SERPL-SCNC: 100 MMOL/L (ref 98–107)
CO2 SERPL-SCNC: 29.1 MMOL/L (ref 22–29)
CREAT SERPL-MCNC: 0.9 MG/DL (ref 0.57–1)
DEPRECATED RDW RBC AUTO: 44.1 FL (ref 37–54)
EGFRCR SERPLBLD CKD-EPI 2021: 63.2 ML/MIN/1.73
EOSINOPHIL # BLD AUTO: 0.06 10*3/MM3 (ref 0–0.4)
EOSINOPHIL NFR BLD AUTO: 1.1 % (ref 0.3–6.2)
ERYTHROCYTE [DISTWIDTH] IN BLOOD BY AUTOMATED COUNT: 13.5 % (ref 12.3–15.4)
GLOBULIN UR ELPH-MCNC: 2.7 GM/DL
GLUCOSE SERPL-MCNC: 85 MG/DL (ref 65–99)
HCT VFR BLD AUTO: 44.5 % (ref 34–46.6)
HGB BLD-MCNC: 14.7 G/DL (ref 12–15.9)
IMM GRANULOCYTES # BLD AUTO: 0.01 10*3/MM3 (ref 0–0.05)
IMM GRANULOCYTES NFR BLD AUTO: 0.2 % (ref 0–0.5)
LYMPHOCYTES # BLD AUTO: 1.25 10*3/MM3 (ref 0.7–3.1)
LYMPHOCYTES NFR BLD AUTO: 23.1 % (ref 19.6–45.3)
MCH RBC QN AUTO: 29.6 PG (ref 26.6–33)
MCHC RBC AUTO-ENTMCNC: 33 G/DL (ref 31.5–35.7)
MCV RBC AUTO: 89.7 FL (ref 79–97)
MONOCYTES # BLD AUTO: 0.59 10*3/MM3 (ref 0.1–0.9)
MONOCYTES NFR BLD AUTO: 10.9 % (ref 5–12)
NEUTROPHILS NFR BLD AUTO: 3.47 10*3/MM3 (ref 1.7–7)
NEUTROPHILS NFR BLD AUTO: 64.1 % (ref 42.7–76)
NRBC BLD AUTO-RTO: 0 /100 WBC (ref 0–0.2)
PLATELET # BLD AUTO: 209 10*3/MM3 (ref 140–450)
PMV BLD AUTO: 9.9 FL (ref 6–12)
POTASSIUM SERPL-SCNC: 4.4 MMOL/L (ref 3.5–5.2)
PROT SERPL-MCNC: 6.6 G/DL (ref 6–8.5)
RBC # BLD AUTO: 4.96 10*6/MM3 (ref 3.77–5.28)
SODIUM SERPL-SCNC: 139 MMOL/L (ref 136–145)
WBC NRBC COR # BLD AUTO: 5.41 10*3/MM3 (ref 3.4–10.8)

## 2025-03-24 PROCEDURE — 80053 COMPREHEN METABOLIC PANEL: CPT

## 2025-03-24 PROCEDURE — 3079F DIAST BP 80-89 MM HG: CPT | Performed by: INTERNAL MEDICINE

## 2025-03-24 PROCEDURE — 99213 OFFICE O/P EST LOW 20 MIN: CPT | Performed by: INTERNAL MEDICINE

## 2025-03-24 PROCEDURE — 85025 COMPLETE CBC W/AUTO DIFF WBC: CPT

## 2025-03-24 PROCEDURE — 1126F AMNT PAIN NOTED NONE PRSNT: CPT | Performed by: INTERNAL MEDICINE

## 2025-03-24 PROCEDURE — 36415 COLL VENOUS BLD VENIPUNCTURE: CPT

## 2025-03-24 PROCEDURE — 3074F SYST BP LT 130 MM HG: CPT | Performed by: INTERNAL MEDICINE
